# Patient Record
Sex: MALE | Race: WHITE | NOT HISPANIC OR LATINO
[De-identification: names, ages, dates, MRNs, and addresses within clinical notes are randomized per-mention and may not be internally consistent; named-entity substitution may affect disease eponyms.]

---

## 2017-01-25 ENCOUNTER — APPOINTMENT (OUTPATIENT)
Dept: CARDIOLOGY | Facility: CLINIC | Age: 77
End: 2017-01-25

## 2017-01-25 ENCOUNTER — NON-APPOINTMENT (OUTPATIENT)
Age: 77
End: 2017-01-25

## 2017-01-25 VITALS
DIASTOLIC BLOOD PRESSURE: 88 MMHG | HEIGHT: 67 IN | WEIGHT: 166 LBS | BODY MASS INDEX: 26.06 KG/M2 | RESPIRATION RATE: 15 BRPM | SYSTOLIC BLOOD PRESSURE: 155 MMHG | OXYGEN SATURATION: 98 % | HEART RATE: 70 BPM

## 2017-02-23 ENCOUNTER — RX RENEWAL (OUTPATIENT)
Age: 77
End: 2017-02-23

## 2017-04-17 ENCOUNTER — RX RENEWAL (OUTPATIENT)
Age: 77
End: 2017-04-17

## 2017-05-08 ENCOUNTER — EMERGENCY (EMERGENCY)
Facility: HOSPITAL | Age: 77
LOS: 1 days | Discharge: HOME | End: 2017-05-08
Admitting: EMERGENCY MEDICINE
Payer: MEDICARE

## 2017-05-08 DIAGNOSIS — Z98.89 OTHER SPECIFIED POSTPROCEDURAL STATES: Chronic | ICD-10-CM

## 2017-05-08 DIAGNOSIS — K62.5 HEMORRHAGE OF ANUS AND RECTUM: ICD-10-CM

## 2017-05-08 DIAGNOSIS — Z88.0 ALLERGY STATUS TO PENICILLIN: ICD-10-CM

## 2017-05-08 DIAGNOSIS — I10 ESSENTIAL (PRIMARY) HYPERTENSION: ICD-10-CM

## 2017-05-08 DIAGNOSIS — Z79.899 OTHER LONG TERM (CURRENT) DRUG THERAPY: ICD-10-CM

## 2017-05-08 DIAGNOSIS — I25.10 ATHEROSCLEROTIC HEART DISEASE OF NATIVE CORONARY ARTERY WITHOUT ANGINA PECTORIS: ICD-10-CM

## 2017-05-08 DIAGNOSIS — K64.9 UNSPECIFIED HEMORRHOIDS: ICD-10-CM

## 2017-05-08 DIAGNOSIS — Z79.82 LONG TERM (CURRENT) USE OF ASPIRIN: ICD-10-CM

## 2017-05-08 DIAGNOSIS — F17.200 NICOTINE DEPENDENCE, UNSPECIFIED, UNCOMPLICATED: ICD-10-CM

## 2017-05-08 DIAGNOSIS — Z98.890 OTHER SPECIFIED POSTPROCEDURAL STATES: ICD-10-CM

## 2017-05-08 PROCEDURE — 85610 PROTHROMBIN TIME: CPT

## 2017-05-08 PROCEDURE — 85027 COMPLETE CBC AUTOMATED: CPT

## 2017-05-08 PROCEDURE — 99284 EMERGENCY DEPT VISIT MOD MDM: CPT

## 2017-05-08 PROCEDURE — 99283 EMERGENCY DEPT VISIT LOW MDM: CPT | Mod: 25

## 2017-05-08 PROCEDURE — 36415 COLL VENOUS BLD VENIPUNCTURE: CPT

## 2017-05-08 PROCEDURE — 85730 THROMBOPLASTIN TIME PARTIAL: CPT

## 2017-05-21 LAB
ALBUMIN SERPL ELPH-MCNC: 3.8 G/DL
ALP BLD-CCNC: 89 U/L
ALT SERPL-CCNC: 33 U/L
ANION GAP SERPL CALC-SCNC: 16 MMOL/L
AST SERPL-CCNC: 63 U/L
BILIRUB SERPL-MCNC: 0.7 MG/DL
BUN SERPL-MCNC: 23 MG/DL
CALCIUM SERPL-MCNC: 9.5 MG/DL
CHLORIDE SERPL-SCNC: 101 MMOL/L
CHOLEST SERPL-MCNC: 139 MG/DL
CHOLEST/HDLC SERPL: 2.8 RATIO
CO2 SERPL-SCNC: 22 MMOL/L
CREAT SERPL-MCNC: 1.28 MG/DL
GLUCOSE SERPL-MCNC: 107 MG/DL
HDLC SERPL-MCNC: 49 MG/DL
LDLC SERPL CALC-MCNC: 72 MG/DL
POTASSIUM SERPL-SCNC: 4 MMOL/L
PROT SERPL-MCNC: 6.6 G/DL
SODIUM SERPL-SCNC: 139 MMOL/L
TRIGL SERPL-MCNC: 91 MG/DL

## 2017-05-24 ENCOUNTER — APPOINTMENT (OUTPATIENT)
Dept: CARDIOLOGY | Facility: CLINIC | Age: 77
End: 2017-05-24

## 2017-06-16 ENCOUNTER — APPOINTMENT (OUTPATIENT)
Dept: FAMILY MEDICINE | Facility: CLINIC | Age: 77
End: 2017-06-16

## 2017-06-16 VITALS
HEIGHT: 67 IN | BODY MASS INDEX: 25.58 KG/M2 | DIASTOLIC BLOOD PRESSURE: 60 MMHG | OXYGEN SATURATION: 98 % | RESPIRATION RATE: 14 BRPM | TEMPERATURE: 96.8 F | SYSTOLIC BLOOD PRESSURE: 134 MMHG | HEART RATE: 91 BPM | WEIGHT: 163 LBS

## 2017-08-23 LAB
CREAT SPEC-SCNC: 127 MG/DL
FOLATE SERPL-MCNC: >20 NG/ML
HBA1C MFR BLD HPLC: 6.7 %
MICROALBUMIN 24H UR DL<=1MG/L-MCNC: <0.3 MG/DL
MICROALBUMIN/CREAT 24H UR-RTO: NORMAL
VIT B12 SERPL-MCNC: 613 PG/ML

## 2017-08-30 ENCOUNTER — APPOINTMENT (OUTPATIENT)
Dept: CARDIOLOGY | Facility: CLINIC | Age: 77
End: 2017-08-30
Payer: MEDICARE

## 2017-08-30 ENCOUNTER — NON-APPOINTMENT (OUTPATIENT)
Age: 77
End: 2017-08-30

## 2017-08-30 VITALS
OXYGEN SATURATION: 97 % | HEART RATE: 74 BPM | RESPIRATION RATE: 16 BRPM | SYSTOLIC BLOOD PRESSURE: 126 MMHG | WEIGHT: 165 LBS | DIASTOLIC BLOOD PRESSURE: 81 MMHG | BODY MASS INDEX: 25.9 KG/M2 | HEIGHT: 67 IN

## 2017-08-30 PROCEDURE — 99214 OFFICE O/P EST MOD 30 MIN: CPT

## 2017-08-30 PROCEDURE — 93000 ELECTROCARDIOGRAM COMPLETE: CPT

## 2017-10-24 ENCOUNTER — APPOINTMENT (OUTPATIENT)
Dept: FAMILY MEDICINE | Facility: CLINIC | Age: 77
End: 2017-10-24
Payer: MEDICARE

## 2017-10-24 ENCOUNTER — MED ADMIN CHARGE (OUTPATIENT)
Age: 77
End: 2017-10-24

## 2017-10-24 VITALS
HEART RATE: 76 BPM | WEIGHT: 163 LBS | HEIGHT: 67 IN | TEMPERATURE: 97.6 F | SYSTOLIC BLOOD PRESSURE: 120 MMHG | DIASTOLIC BLOOD PRESSURE: 60 MMHG | BODY MASS INDEX: 25.58 KG/M2 | OXYGEN SATURATION: 96 % | RESPIRATION RATE: 16 BRPM

## 2017-10-24 PROCEDURE — G0008: CPT

## 2017-10-24 PROCEDURE — G0439: CPT

## 2017-10-24 PROCEDURE — 90686 IIV4 VACC NO PRSV 0.5 ML IM: CPT

## 2017-12-13 ENCOUNTER — NON-APPOINTMENT (OUTPATIENT)
Age: 77
End: 2017-12-13

## 2017-12-13 ENCOUNTER — APPOINTMENT (OUTPATIENT)
Dept: CARDIOLOGY | Facility: CLINIC | Age: 77
End: 2017-12-13
Payer: MEDICARE

## 2017-12-13 VITALS
HEIGHT: 67 IN | HEART RATE: 82 BPM | RESPIRATION RATE: 18 BRPM | DIASTOLIC BLOOD PRESSURE: 81 MMHG | OXYGEN SATURATION: 96 % | SYSTOLIC BLOOD PRESSURE: 132 MMHG | BODY MASS INDEX: 25.9 KG/M2 | WEIGHT: 165 LBS

## 2017-12-13 PROCEDURE — 99214 OFFICE O/P EST MOD 30 MIN: CPT

## 2017-12-13 PROCEDURE — 93000 ELECTROCARDIOGRAM COMPLETE: CPT

## 2018-03-01 ENCOUNTER — APPOINTMENT (OUTPATIENT)
Dept: FAMILY MEDICINE | Facility: CLINIC | Age: 78
End: 2018-03-01
Payer: MEDICARE

## 2018-03-01 VITALS
RESPIRATION RATE: 16 BRPM | OXYGEN SATURATION: 98 % | HEART RATE: 71 BPM | WEIGHT: 162 LBS | BODY MASS INDEX: 25.43 KG/M2 | DIASTOLIC BLOOD PRESSURE: 70 MMHG | HEIGHT: 67 IN | SYSTOLIC BLOOD PRESSURE: 139 MMHG | TEMPERATURE: 97.2 F

## 2018-03-01 DIAGNOSIS — Z01.818 ENCOUNTER FOR OTHER PREPROCEDURAL EXAMINATION: ICD-10-CM

## 2018-03-01 DIAGNOSIS — Z92.29 PERSONAL HISTORY OF OTHER DRUG THERAPY: ICD-10-CM

## 2018-03-01 DIAGNOSIS — Z87.09 PERSONAL HISTORY OF OTHER DISEASES OF THE RESPIRATORY SYSTEM: ICD-10-CM

## 2018-03-01 DIAGNOSIS — Z23 ENCOUNTER FOR IMMUNIZATION: ICD-10-CM

## 2018-03-01 DIAGNOSIS — Z86.718 PERSONAL HISTORY OF OTHER VENOUS THROMBOSIS AND EMBOLISM: ICD-10-CM

## 2018-03-01 PROCEDURE — 99214 OFFICE O/P EST MOD 30 MIN: CPT

## 2018-03-05 ENCOUNTER — FORM ENCOUNTER (OUTPATIENT)
Age: 78
End: 2018-03-05

## 2018-03-06 ENCOUNTER — APPOINTMENT (OUTPATIENT)
Dept: RADIOLOGY | Facility: HOSPITAL | Age: 78
End: 2018-03-06
Payer: MEDICARE

## 2018-03-06 ENCOUNTER — OUTPATIENT (OUTPATIENT)
Dept: OUTPATIENT SERVICES | Facility: HOSPITAL | Age: 78
LOS: 1 days | End: 2018-03-06
Payer: MEDICARE

## 2018-03-06 DIAGNOSIS — Z98.89 OTHER SPECIFIED POSTPROCEDURAL STATES: Chronic | ICD-10-CM

## 2018-03-06 DIAGNOSIS — Z00.8 ENCOUNTER FOR OTHER GENERAL EXAMINATION: ICD-10-CM

## 2018-03-06 DIAGNOSIS — Z13.820 ENCOUNTER FOR SCREENING FOR OSTEOPOROSIS: ICD-10-CM

## 2018-03-06 PROCEDURE — 77080 DXA BONE DENSITY AXIAL: CPT | Mod: 26

## 2018-03-06 PROCEDURE — 77080 DXA BONE DENSITY AXIAL: CPT

## 2018-03-12 ENCOUNTER — RX RENEWAL (OUTPATIENT)
Age: 78
End: 2018-03-12

## 2018-03-12 LAB
ALBUMIN SERPL ELPH-MCNC: 4 G/DL
ALP BLD-CCNC: 83 U/L
ALT SERPL-CCNC: 16 U/L
ANION GAP SERPL CALC-SCNC: 15 MMOL/L
AST SERPL-CCNC: 25 U/L
BASOPHILS # BLD AUTO: 0.01 K/UL
BASOPHILS NFR BLD AUTO: 0.1 %
BILIRUB SERPL-MCNC: 0.6 MG/DL
BUN SERPL-MCNC: 30 MG/DL
CALCIUM SERPL-MCNC: 9.8 MG/DL
CHLORIDE SERPL-SCNC: 103 MMOL/L
CHOLEST SERPL-MCNC: 130 MG/DL
CHOLEST/HDLC SERPL: 2.7 RATIO
CO2 SERPL-SCNC: 22 MMOL/L
CREAT SERPL-MCNC: 1.27 MG/DL
EOSINOPHIL # BLD AUTO: 0.19 K/UL
EOSINOPHIL NFR BLD AUTO: 2.7 %
GLUCOSE SERPL-MCNC: 136 MG/DL
HBA1C MFR BLD HPLC: 6.8 %
HCT VFR BLD CALC: 42.7 %
HDLC SERPL-MCNC: 48 MG/DL
HGB BLD-MCNC: 14.7 G/DL
IMM GRANULOCYTES NFR BLD AUTO: 0.3 %
LDLC SERPL CALC-MCNC: 63 MG/DL
LYMPHOCYTES # BLD AUTO: 1.59 K/UL
LYMPHOCYTES NFR BLD AUTO: 22.8 %
MAN DIFF?: NORMAL
MCHC RBC-ENTMCNC: 34.4 GM/DL
MCHC RBC-ENTMCNC: 34.8 PG
MCV RBC AUTO: 101.2 FL
MONOCYTES # BLD AUTO: 0.73 K/UL
MONOCYTES NFR BLD AUTO: 10.5 %
NEUTROPHILS # BLD AUTO: 4.43 K/UL
NEUTROPHILS NFR BLD AUTO: 63.6 %
PLATELET # BLD AUTO: 185 K/UL
POTASSIUM SERPL-SCNC: 4.3 MMOL/L
PROT SERPL-MCNC: 7 G/DL
RBC # BLD: 4.22 M/UL
RBC # FLD: 14.5 %
SODIUM SERPL-SCNC: 140 MMOL/L
TRIGL SERPL-MCNC: 96 MG/DL
TSH SERPL-ACNC: 1.18 UIU/ML
WBC # FLD AUTO: 6.97 K/UL

## 2018-03-14 ENCOUNTER — APPOINTMENT (OUTPATIENT)
Dept: CARDIOLOGY | Facility: CLINIC | Age: 78
End: 2018-03-14
Payer: MEDICARE

## 2018-03-14 ENCOUNTER — NON-APPOINTMENT (OUTPATIENT)
Age: 78
End: 2018-03-14

## 2018-03-14 VITALS
RESPIRATION RATE: 15 BRPM | BODY MASS INDEX: 25.43 KG/M2 | OXYGEN SATURATION: 98 % | HEART RATE: 84 BPM | SYSTOLIC BLOOD PRESSURE: 110 MMHG | WEIGHT: 162 LBS | DIASTOLIC BLOOD PRESSURE: 74 MMHG | HEIGHT: 67 IN

## 2018-03-14 PROCEDURE — 93000 ELECTROCARDIOGRAM COMPLETE: CPT

## 2018-03-14 PROCEDURE — 99214 OFFICE O/P EST MOD 30 MIN: CPT

## 2018-04-07 ENCOUNTER — APPOINTMENT (OUTPATIENT)
Dept: ORTHOPEDIC SURGERY | Facility: CLINIC | Age: 78
End: 2018-04-07
Payer: MEDICARE

## 2018-04-07 VITALS
WEIGHT: 162 LBS | HEIGHT: 60 IN | SYSTOLIC BLOOD PRESSURE: 138 MMHG | BODY MASS INDEX: 31.8 KG/M2 | DIASTOLIC BLOOD PRESSURE: 78 MMHG | HEART RATE: 69 BPM

## 2018-04-07 PROCEDURE — 99204 OFFICE O/P NEW MOD 45 MIN: CPT

## 2018-04-07 PROCEDURE — 72110 X-RAY EXAM L-2 SPINE 4/>VWS: CPT

## 2018-05-05 LAB
ALBUMIN SERPL ELPH-MCNC: 3.9 G/DL
ALP BLD-CCNC: 84 U/L
ALT SERPL-CCNC: 14 U/L
ANION GAP SERPL CALC-SCNC: 13 MMOL/L
AST SERPL-CCNC: 22 U/L
BILIRUB SERPL-MCNC: 0.6 MG/DL
BUN SERPL-MCNC: 27 MG/DL
CALCIUM SERPL-MCNC: 9.6 MG/DL
CHLORIDE SERPL-SCNC: 100 MMOL/L
CHOLEST SERPL-MCNC: 130 MG/DL
CHOLEST/HDLC SERPL: 2.6 RATIO
CO2 SERPL-SCNC: 27 MMOL/L
CREAT SERPL-MCNC: 1.2 MG/DL
GLUCOSE SERPL-MCNC: 112 MG/DL
HDLC SERPL-MCNC: 50 MG/DL
LDLC SERPL CALC-MCNC: 64 MG/DL
POTASSIUM SERPL-SCNC: 4.3 MMOL/L
PROT SERPL-MCNC: 6.6 G/DL
SODIUM SERPL-SCNC: 140 MMOL/L
TRIGL SERPL-MCNC: 80 MG/DL

## 2018-05-09 ENCOUNTER — NON-APPOINTMENT (OUTPATIENT)
Age: 78
End: 2018-05-09

## 2018-05-09 ENCOUNTER — APPOINTMENT (OUTPATIENT)
Dept: CARDIOLOGY | Facility: CLINIC | Age: 78
End: 2018-05-09
Payer: MEDICARE

## 2018-05-09 VITALS
SYSTOLIC BLOOD PRESSURE: 138 MMHG | RESPIRATION RATE: 15 BRPM | HEIGHT: 60 IN | DIASTOLIC BLOOD PRESSURE: 76 MMHG | BODY MASS INDEX: 32.2 KG/M2 | HEART RATE: 70 BPM | OXYGEN SATURATION: 100 % | WEIGHT: 164 LBS

## 2018-05-09 PROCEDURE — 99214 OFFICE O/P EST MOD 30 MIN: CPT

## 2018-05-09 PROCEDURE — 93000 ELECTROCARDIOGRAM COMPLETE: CPT

## 2018-06-04 ENCOUNTER — APPOINTMENT (OUTPATIENT)
Dept: CARDIOLOGY | Facility: CLINIC | Age: 78
End: 2018-06-04
Payer: MEDICARE

## 2018-06-04 PROCEDURE — 93306 TTE W/DOPPLER COMPLETE: CPT

## 2018-06-13 ENCOUNTER — APPOINTMENT (OUTPATIENT)
Dept: ORTHOPEDIC SURGERY | Facility: CLINIC | Age: 78
End: 2018-06-13
Payer: MEDICARE

## 2018-06-13 VITALS
HEART RATE: 71 BPM | HEIGHT: 60 IN | BODY MASS INDEX: 32.2 KG/M2 | WEIGHT: 164 LBS | SYSTOLIC BLOOD PRESSURE: 115 MMHG | DIASTOLIC BLOOD PRESSURE: 67 MMHG

## 2018-06-13 DIAGNOSIS — Z87.39 PERSONAL HISTORY OF OTHER DISEASES OF THE MUSCULOSKELETAL SYSTEM AND CONNECTIVE TISSUE: ICD-10-CM

## 2018-06-13 PROCEDURE — 99214 OFFICE O/P EST MOD 30 MIN: CPT

## 2018-06-28 ENCOUNTER — APPOINTMENT (OUTPATIENT)
Dept: FAMILY MEDICINE | Facility: CLINIC | Age: 78
End: 2018-06-28
Payer: MEDICARE

## 2018-06-28 VITALS
BODY MASS INDEX: 31.83 KG/M2 | HEART RATE: 77 BPM | RESPIRATION RATE: 14 BRPM | OXYGEN SATURATION: 99 % | DIASTOLIC BLOOD PRESSURE: 60 MMHG | TEMPERATURE: 97 F | WEIGHT: 163 LBS | SYSTOLIC BLOOD PRESSURE: 144 MMHG

## 2018-06-28 PROCEDURE — 99214 OFFICE O/P EST MOD 30 MIN: CPT

## 2018-06-28 NOTE — HISTORY OF PRESENT ILLNESS
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 Pain 2-3/10  Pt denies CP SOB palpitations or dizziness his Lipitor per pt was cut down by cardiology DR Fabian

## 2018-06-28 NOTE — HEALTH RISK ASSESSMENT
[Intercurrent ED visits] : went to ED [One fall no injury in past year] : Patient reported one fall in the past year without injury [0] : 2) Feeling down, depressed, or hopeless: Not at all (0) [] : No [de-identified] : rectal bleeding  [[id number]] : 0

## 2018-06-28 NOTE — REVIEW OF SYSTEMS
[Joint Stiffness] : joint stiffness [Back Pain] : back pain [Negative] : Heme/Lymph [FreeTextEntry9] : dropped foot bilaterally

## 2018-06-28 NOTE — COUNSELING
[Healthy eating counseling provided] : healthy eating [Activity counseling provided] : activity [___ min/wk activity recommended] : [unfilled] min/wk activity recommended [None] : None [Good understanding] : Patient has a good understanding of lifestyle changes and the steps needed to achieve self management goals

## 2018-06-28 NOTE — PHYSICAL EXAM
[No Acute Distress] : no acute distress [Clear to Auscultation] : lungs were clear to auscultation bilaterally [No Accessory Muscle Use] : no accessory muscle use [Normal S1, S2] : normal S1 and S2 [Soft] : abdomen soft [Non Tender] : non-tender [Non-distended] : non-distended [No Masses] : no abdominal mass palpated [No CVA Tenderness] : no CVA  tenderness [No Joint Swelling] : no joint swelling [No Rash] : no rash [de-identified] : unstable gait secondary bilateral dropped feet

## 2018-07-05 LAB
TSH SERPL-ACNC: 0.99 UIU/ML
VIT B12 SERPL-MCNC: 777 PG/ML

## 2018-08-10 ENCOUNTER — APPOINTMENT (OUTPATIENT)
Dept: CARDIOLOGY | Facility: CLINIC | Age: 78
End: 2018-08-10
Payer: MEDICARE

## 2018-08-10 ENCOUNTER — NON-APPOINTMENT (OUTPATIENT)
Age: 78
End: 2018-08-10

## 2018-08-10 VITALS
SYSTOLIC BLOOD PRESSURE: 125 MMHG | OXYGEN SATURATION: 98 % | HEIGHT: 60 IN | RESPIRATION RATE: 15 BRPM | HEART RATE: 73 BPM | BODY MASS INDEX: 31.8 KG/M2 | WEIGHT: 162 LBS | DIASTOLIC BLOOD PRESSURE: 77 MMHG

## 2018-08-10 PROCEDURE — 99213 OFFICE O/P EST LOW 20 MIN: CPT

## 2018-08-10 PROCEDURE — 93000 ELECTROCARDIOGRAM COMPLETE: CPT

## 2018-08-29 ENCOUNTER — RX RENEWAL (OUTPATIENT)
Age: 78
End: 2018-08-29

## 2018-10-17 ENCOUNTER — FORM ENCOUNTER (OUTPATIENT)
Age: 78
End: 2018-10-17

## 2018-10-18 ENCOUNTER — APPOINTMENT (OUTPATIENT)
Dept: RADIOLOGY | Facility: HOSPITAL | Age: 78
End: 2018-10-18
Payer: MEDICARE

## 2018-10-18 ENCOUNTER — OUTPATIENT (OUTPATIENT)
Dept: OUTPATIENT SERVICES | Facility: HOSPITAL | Age: 78
LOS: 1 days | End: 2018-10-18
Payer: MEDICARE

## 2018-10-18 ENCOUNTER — APPOINTMENT (OUTPATIENT)
Dept: FAMILY MEDICINE | Facility: CLINIC | Age: 78
End: 2018-10-18
Payer: MEDICARE

## 2018-10-18 VITALS
RESPIRATION RATE: 16 BRPM | DIASTOLIC BLOOD PRESSURE: 80 MMHG | BODY MASS INDEX: 31.25 KG/M2 | OXYGEN SATURATION: 95 % | WEIGHT: 160 LBS | SYSTOLIC BLOOD PRESSURE: 130 MMHG | TEMPERATURE: 98.2 F | HEART RATE: 84 BPM

## 2018-10-18 DIAGNOSIS — Z98.89 OTHER SPECIFIED POSTPROCEDURAL STATES: Chronic | ICD-10-CM

## 2018-10-18 DIAGNOSIS — Z00.8 ENCOUNTER FOR OTHER GENERAL EXAMINATION: ICD-10-CM

## 2018-10-18 PROCEDURE — 99213 OFFICE O/P EST LOW 20 MIN: CPT

## 2018-10-18 PROCEDURE — 71046 X-RAY EXAM CHEST 2 VIEWS: CPT

## 2018-10-18 PROCEDURE — 71046 X-RAY EXAM CHEST 2 VIEWS: CPT | Mod: 26

## 2018-10-19 ENCOUNTER — OUTPATIENT (OUTPATIENT)
Dept: OUTPATIENT SERVICES | Facility: HOSPITAL | Age: 78
LOS: 1 days | End: 2018-10-19

## 2018-10-19 ENCOUNTER — APPOINTMENT (OUTPATIENT)
Dept: CT IMAGING | Facility: HOSPITAL | Age: 78
End: 2018-10-19

## 2018-10-19 DIAGNOSIS — Z98.89 OTHER SPECIFIED POSTPROCEDURAL STATES: Chronic | ICD-10-CM

## 2018-10-19 DIAGNOSIS — Z00.8 ENCOUNTER FOR OTHER GENERAL EXAMINATION: ICD-10-CM

## 2018-10-21 ENCOUNTER — FORM ENCOUNTER (OUTPATIENT)
Age: 78
End: 2018-10-21

## 2018-10-22 ENCOUNTER — APPOINTMENT (OUTPATIENT)
Dept: CT IMAGING | Facility: HOSPITAL | Age: 78
End: 2018-10-22
Payer: MEDICARE

## 2018-10-22 ENCOUNTER — OUTPATIENT (OUTPATIENT)
Dept: OUTPATIENT SERVICES | Facility: HOSPITAL | Age: 78
LOS: 1 days | End: 2018-10-22
Payer: MEDICARE

## 2018-10-22 DIAGNOSIS — Z98.89 OTHER SPECIFIED POSTPROCEDURAL STATES: Chronic | ICD-10-CM

## 2018-10-22 DIAGNOSIS — R93.0 ABNORMAL FINDINGS ON DIAGNOSTIC IMAGING OF SKULL AND HEAD, NOT ELSEWHERE CLASSIFIED: ICD-10-CM

## 2018-10-22 DIAGNOSIS — R05 COUGH: ICD-10-CM

## 2018-10-22 PROCEDURE — 71250 CT THORAX DX C-: CPT

## 2018-10-22 PROCEDURE — 71250 CT THORAX DX C-: CPT | Mod: 26

## 2018-10-22 NOTE — HEALTH RISK ASSESSMENT
[] : No [No falls in past year] : Patient reported no falls in the past year [0] : 2) Feeling down, depressed, or hopeless: Not at all (0) [[id number]] : 0

## 2018-10-22 NOTE — ASSESSMENT
[FreeTextEntry1] : Cough- in light of sx and o2 sat\par consider pneumonia\par check xray\par zithromax as directed\par mucinex \par will call with results

## 2018-10-22 NOTE — PHYSICAL EXAM

## 2018-10-22 NOTE — HISTORY OF PRESENT ILLNESS
[FreeTextEntry1] : Cough [de-identified] : Patient with worsening cough\par no fever, but increased sob\par green sputum. Wife recently sick as well\par no chest pain

## 2018-10-25 ENCOUNTER — APPOINTMENT (OUTPATIENT)
Dept: FAMILY MEDICINE | Facility: CLINIC | Age: 78
End: 2018-10-25
Payer: MEDICARE

## 2018-10-25 VITALS
OXYGEN SATURATION: 100 % | TEMPERATURE: 96.8 F | RESPIRATION RATE: 16 BRPM | BODY MASS INDEX: 32 KG/M2 | SYSTOLIC BLOOD PRESSURE: 130 MMHG | DIASTOLIC BLOOD PRESSURE: 72 MMHG | HEIGHT: 60 IN | HEART RATE: 92 BPM | WEIGHT: 163 LBS

## 2018-10-25 PROCEDURE — G0439: CPT

## 2018-10-25 PROCEDURE — G0008: CPT

## 2018-10-25 PROCEDURE — 90686 IIV4 VACC NO PRSV 0.5 ML IM: CPT

## 2018-10-25 NOTE — HISTORY OF PRESENT ILLNESS
[FreeTextEntry1] : ANNUAL CPE [de-identified] : 77 y/o PMHX DM2 HTN HLD A Fib CAD CKD  s/o treatment left upper lobe pneumonia found to have nodules needs f/u CT 3 months here for annual exam and f/u denies CP SOB dizziness or palpitations,. Pt still persistent cough but states is getting better. Pt denies fevers or chills feels better. Pt  denies blood in stool or urine. Daughter and wife worried since pt is becoming more forgetful at times gets 2 cups of coffee has his own routine and f/u but very forgetful. Pt no worsening gait no NA no N&V no vision changes

## 2018-10-25 NOTE — REVIEW OF SYSTEMS
[Earache] : no earache [Hearing Loss] : hearing loss [Nosebleeds] : no nosebleeds [Postnasal Drip] : no postnasal drip [Nasal Discharge] : nasal discharge [Hoarseness] : no hoarseness [Chest Pain] : no chest pain [Claudication] : no  leg claudication [Lower Ext Edema] : no lower extremity edema [Orthopena] : no orthopnea [Paroysmal Nocturnal Dyspnea] : no paroysmal nocturnal dyspnea [Shortness Of Breath] : no shortness of breath [Cough] : cough [Dyspnea on Exertion] : dyspnea on exertion [Negative] : Heme/Lymph [FreeTextEntry6] : states [place] and [place]

## 2018-10-25 NOTE — PHYSICAL EXAM
[No Acute Distress] : no acute distress [Well Nourished] : well nourished [Normal Sclera/Conjunctiva] : normal sclera/conjunctiva [PERRL] : pupils equal round and reactive to light [EOMI] : extraocular movements intact [Normal Outer Ear/Nose] : the outer ears and nose were normal in appearance [Normal Oropharynx] : the oropharynx was normal [Normal TMs] : both tympanic membranes were normal [Supple] : supple [No Lymphadenopathy] : no lymphadenopathy [Thyroid Normal, No Nodules] : the thyroid was normal and there were no nodules present [No Respiratory Distress] : no respiratory distress  [No Accessory Muscle Use] : no accessory muscle use [Normal S1, S2] : normal S1 and S2 [Soft] : abdomen soft [Non Tender] : non-tender [Non-distended] : non-distended [No Masses] : no abdominal mass palpated [No HSM] : no HSM [Normal Bowel Sounds] : normal bowel sounds [Normal Posterior Cervical Nodes] : no posterior cervical lymphadenopathy [Normal Anterior Cervical Nodes] : no anterior cervical lymphadenopathy [No CVA Tenderness] : no CVA  tenderness [No Spinal Tenderness] : no spinal tenderness [Grossly Normal Strength/Tone] : grossly normal strength/tone [No Rash] : no rash [No Focal Deficits] : no focal deficits [Normal Affect] : the affect was normal [Normal Insight/Judgement] : insight and judgment were intact [None] : no ulcers in either foot were found [] : with full ROM [de-identified] : AA In NAD [de-identified] : upper dentures [de-identified] : occasional crackles RML [de-identified] : murmur present [de-identified] : uses breaces both feet foot drop [de-identified] : unstable gait due bilateral dropped foot uses braces ankle and feet [de-identified] : bilkateral dropped foot

## 2018-10-25 NOTE — HEALTH RISK ASSESSMENT
[Good] : ~his/her~  mood as  good [No falls in past year] : Patient reported no falls in the past year [0] : 2) Feeling down, depressed, or hopeless: Not at all (0) [Change in mental status noted] : Change in mental status noted [] : No [de-identified] : no [[id number]] : 0 [de-identified] : forgetfull as per daughter

## 2018-10-25 NOTE — COUNSELING
[Healthy eating counseling provided] : healthy eating [Activity counseling provided] : activity [Behavioral health counseling provided] : behavioral health  [Keep Food Diary] : Keep food diary [Low Salt Diet] : Low salt diet [None] : None [Good understanding] : Patient has a good understanding of lifestyle changes and the steps needed to achieve self management goals

## 2018-10-29 LAB
CREAT SPEC-SCNC: 108 MG/DL
MICROALBUMIN 24H UR DL<=1MG/L-MCNC: <1.2 MG/DL
MICROALBUMIN/CREAT 24H UR-RTO: NORMAL

## 2018-10-31 ENCOUNTER — LABORATORY RESULT (OUTPATIENT)
Age: 78
End: 2018-10-31

## 2018-10-31 LAB — HBA1C MFR BLD HPLC: 6.9 %

## 2018-11-01 LAB
ALBUMIN SERPL ELPH-MCNC: 3.9 G/DL
ALP BLD-CCNC: 82 U/L
ALT SERPL-CCNC: 24 U/L
ANION GAP SERPL CALC-SCNC: 13 MMOL/L
AST SERPL-CCNC: 27 U/L
BILIRUB SERPL-MCNC: 0.6 MG/DL
BUN SERPL-MCNC: 27 MG/DL
CALCIUM SERPL-MCNC: 9.2 MG/DL
CHLORIDE SERPL-SCNC: 99 MMOL/L
CHOLEST SERPL-MCNC: 121 MG/DL
CHOLEST/HDLC SERPL: 3.1 RATIO
CO2 SERPL-SCNC: 24 MMOL/L
CREAT SERPL-MCNC: 1.16 MG/DL
GLUCOSE SERPL-MCNC: 122 MG/DL
HDLC SERPL-MCNC: 39 MG/DL
LDLC SERPL CALC-MCNC: 60 MG/DL
POTASSIUM SERPL-SCNC: 4.4 MMOL/L
PROT SERPL-MCNC: 6.2 G/DL
SODIUM SERPL-SCNC: 136 MMOL/L
TRIGL SERPL-MCNC: 108 MG/DL

## 2018-11-14 ENCOUNTER — APPOINTMENT (OUTPATIENT)
Dept: CARDIOLOGY | Facility: CLINIC | Age: 78
End: 2018-11-14
Payer: MEDICARE

## 2018-11-14 VITALS
WEIGHT: 162 LBS | RESPIRATION RATE: 15 BRPM | SYSTOLIC BLOOD PRESSURE: 145 MMHG | BODY MASS INDEX: 31.8 KG/M2 | HEART RATE: 80 BPM | HEIGHT: 60 IN | DIASTOLIC BLOOD PRESSURE: 69 MMHG | OXYGEN SATURATION: 99 %

## 2018-11-14 PROCEDURE — 93000 ELECTROCARDIOGRAM COMPLETE: CPT

## 2018-11-14 PROCEDURE — 99214 OFFICE O/P EST MOD 30 MIN: CPT

## 2018-11-15 NOTE — REASON FOR VISIT
[Follow-Up - Clinic] : a clinic follow-up of [Coronary Artery Disease] : coronary artery disease [Hyperlipidemia] : hyperlipidemia [FreeTextEntry1] : Michael had a cough for another ct. he has memory loss. he is considering an mri of the brain with dr Esteban.

## 2018-11-15 NOTE — DISCUSSION/SUMMARY
[Coronary Artery Disease] : coronary artery disease [Stable] : stable [None] : none [Peripheral Vascular Disease] : peripheral vascular disease [de-identified] : carotid dopplers

## 2018-11-15 NOTE — PHYSICAL EXAM
[General Appearance - Well Developed] : well developed [Normal Appearance] : normal appearance [Well Groomed] : well groomed [General Appearance - Well Nourished] : well nourished [No Deformities] : no deformities [General Appearance - In No Acute Distress] : no acute distress [Normal Conjunctiva] : the conjunctiva exhibited no abnormalities [Eyelids - No Xanthelasma] : the eyelids demonstrated no xanthelasmas [Normal Oral Mucosa] : normal oral mucosa [No Oral Pallor] : no oral pallor [No Oral Cyanosis] : no oral cyanosis [Normal Jugular Venous A Waves Present] : normal jugular venous A waves present [Normal Jugular Venous V Waves Present] : normal jugular venous V waves present [No Jugular Venous Telles A Waves] : no jugular venous telles A waves [Respiration, Rhythm And Depth] : normal respiratory rhythm and effort [Exaggerated Use Of Accessory Muscles For Inspiration] : no accessory muscle use [Auscultation Breath Sounds / Voice Sounds] : lungs were clear to auscultation bilaterally [Heart Rate And Rhythm] : heart rate and rhythm were normal [Heart Sounds] : normal S1 and S2 [Murmurs] : no murmurs present [Abdomen Soft] : soft [Abdomen Tenderness] : non-tender [Abdomen Mass (___ Cm)] : no abdominal mass palpated [Abnormal Walk] : normal gait [Nail Clubbing] : no clubbing of the fingernails [Cyanosis, Localized] : no localized cyanosis [Petechial Hemorrhages (___cm)] : no petechial hemorrhages [Skin Color & Pigmentation] : normal skin color and pigmentation [] : no rash [No Venous Stasis] : no venous stasis [Skin Lesions] : no skin lesions [No Skin Ulcers] : no skin ulcer [No Xanthoma] : no  xanthoma was observed [Oriented To Time, Place, And Person] : oriented to person, place, and time [Affect] : the affect was normal [Mood] : the mood was normal [No Anxiety] : not feeling anxious [FreeTextEntry1] : uses  a cane

## 2018-12-04 ENCOUNTER — FORM ENCOUNTER (OUTPATIENT)
Age: 78
End: 2018-12-04

## 2018-12-04 ENCOUNTER — APPOINTMENT (OUTPATIENT)
Dept: CARDIOLOGY | Facility: CLINIC | Age: 78
End: 2018-12-04
Payer: MEDICARE

## 2018-12-04 PROCEDURE — 93880 EXTRACRANIAL BILAT STUDY: CPT

## 2018-12-05 ENCOUNTER — APPOINTMENT (OUTPATIENT)
Dept: MRI IMAGING | Facility: HOSPITAL | Age: 78
End: 2018-12-05
Payer: MEDICARE

## 2018-12-05 ENCOUNTER — OUTPATIENT (OUTPATIENT)
Dept: OUTPATIENT SERVICES | Facility: HOSPITAL | Age: 78
LOS: 1 days | End: 2018-12-05
Payer: MEDICARE

## 2018-12-05 DIAGNOSIS — Z98.89 OTHER SPECIFIED POSTPROCEDURAL STATES: Chronic | ICD-10-CM

## 2018-12-05 DIAGNOSIS — R41.3 OTHER AMNESIA: ICD-10-CM

## 2018-12-05 PROCEDURE — 70551 MRI BRAIN STEM W/O DYE: CPT

## 2018-12-05 PROCEDURE — 70551 MRI BRAIN STEM W/O DYE: CPT | Mod: 26

## 2018-12-15 ENCOUNTER — EMERGENCY (EMERGENCY)
Facility: HOSPITAL | Age: 78
LOS: 1 days | Discharge: HOME | End: 2018-12-15
Attending: EMERGENCY MEDICINE
Payer: MEDICARE

## 2018-12-15 VITALS
HEIGHT: 66 IN | WEIGHT: 160.06 LBS | OXYGEN SATURATION: 98 % | TEMPERATURE: 98 F | SYSTOLIC BLOOD PRESSURE: 140 MMHG | DIASTOLIC BLOOD PRESSURE: 82 MMHG | RESPIRATION RATE: 18 BRPM | HEART RATE: 99 BPM

## 2018-12-15 DIAGNOSIS — Z98.89 OTHER SPECIFIED POSTPROCEDURAL STATES: Chronic | ICD-10-CM

## 2018-12-15 PROCEDURE — 99284 EMERGENCY DEPT VISIT MOD MDM: CPT

## 2018-12-15 PROCEDURE — 99282 EMERGENCY DEPT VISIT SF MDM: CPT

## 2018-12-15 NOTE — ED PROVIDER NOTE - OBJECTIVE STATEMENT
79yo male pt with PMHx of HTN, HLD, ambulatory c/o left eye floaters since 3days ago. Denies headache or eye pain. Denies flashes or visual changes. Denies sensory changes or weakness to extremities. Denies CP/SOB/ABD pain or N/V. Denies fever, chills or recent sickness.

## 2018-12-15 NOTE — ED PROVIDER NOTE - NSFOLLOWUPCLINICS_GEN_ALL_ED_FT
[facility]  Ophthalmology  [address] [place] [address] [address]  [place] [address]  Phone: [phone]  Fax:   Follow Up Time:

## 2018-12-15 NOTE — ED PROVIDER NOTE - ATTENDING CONTRIBUTION TO CARE
Attending MD Messina: I personally have seen and examined this patient.  NP note reviewed and agree on plan of care and except where noted.  See below for details.     Seen in [facility], accompanied by wife    78M with PMH/PSH including HTN, back surgery presents to the ED with 2-3 days of a floater in the left eye.  Reports that 3 days ago noted a small hair like thing (sometimes looks like a hairball).  Denies flashes. Denies change in vision, double vision, sudden loss of vision. Denies headache, neck pain.  Denies fevers, chills.  Denies chest pain, shortness of breath, palpitations. Denies abdominal pain, nausea, vomiting, diarrhea, blood in stools. Denies recent illness, previous episode.  Reports on Lisinopril, HCTZ, Atorvastatin, ASA 81mg.  Reports follows with Dr. Fleming of ophthalmology.  On exam, NAD, head NCAT, PERRL, EOMI, confrontational VF full, Va sc OD 20/40, OS 20/30, no periorbital ecchymosis, no tenderness to palpation of orbital rim, lid margins clear, no retained foreign body on lid eversion, no conjunctival injection, no corneal defect, AC no cells or flare, fundus exam limited, disc margins sharp and flat, C/D 0.3 OU; A/P: 78M with floater, suspect posterior vitreous detachment, doubt retinal pathology, ophthalmology in ED, will consult

## 2018-12-15 NOTE — ED PROVIDER NOTE - CARE PLAN
Principal Discharge DX:	Floaters, left Principal Discharge DX:	Posterior vitreous detachment of left eye

## 2018-12-15 NOTE — ED PROVIDER NOTE - PROGRESS NOTE DETAILS
Attending MD Messina: Patient re-evaluated and no acute issues at  this time.  PVD as per ophtho.  Patient stable for discharge. Follow up instructions given, importance of follow up emphasized, return to ED parameters reviewed and patient verbalized understanding.  All questions answered, all concerns addressed.

## 2018-12-15 NOTE — CONSULT NOTE ADULT - ATTENDING COMMENTS
I have reviewed the resident's note. Patient to follow up with primary ophthalmologist or [facility] immediately if there is development of worsening flashes, floaters, or curtains in vision.

## 2018-12-15 NOTE — ED PROVIDER NOTE - PHYSICAL EXAMINATION
NAD, VSS, Afebrile, + PERRL with full EOMS, NAD, VSS, Afebrile, + PERRL with full EOMS, Visual Acuity- 20/30 (Left), 20/40 (Right), No focal neuro deficit.

## 2018-12-15 NOTE — CONSULT NOTE ADULT - SUBJECTIVE AND OBJECTIVE BOX
[facility] Ophthalmology Consult Note    HPI:      PMH: None  Meds: None  POcHx (including surgeries/lasers/trauma):  None  Drops: None  FamHx: None  Social Hx: None  Allergies: NKDA    ROS:  General (neg), Vision (per HPI), Head and Neck (neg), Pulm (neg), CV (neg), GI (neg),  (neg), Musculoskeletal (neg), Skin/Integ (neg), Neuro (neg), Endocrine (neg), Heme (neg), All/Immuno (neg)    Mood and Affect Appropriate ( x ),  Oriented to Time, Place, and Person x 3 ( x )    Ophthalmology Exam    Visual acuity (sc): 20/20 OU  Pupils: PERRL OU, no APD  Ttono: 16 OU  Extraocular movements (EOMs): Full OU, no pain, no diplopia  Confrontational Visual Field (CVF):  Full OU  Color Plates: 12/12 OU    Slit Lamp Exam (SLE)  External:  Flat OU  Lids/Lashes/Lacrimal Ducts: Flat OU    Sclera/Conjunctiva:  W+Q OU  Cornea: Cl OU  Anterior Chamber: D+Q OU   Iris:  Flat OU  Lens:  Cl OU    Fundus Exam: dilated with 1% tropicamide and 2.5% phenylephrine  Approval obtained from primary team for dilation  Patient aware that pupils can remained dilated for at least 4-6 hours  Exam performed with 20D lens    Vitreous: wnl OU  Disc, cup/disc: sharp and pink, 0.4 OU  Macula:  wnl OU  Vessels:  wnl OU  Periphery: wnl OU    Diagnostic Testing:    Assessment:      Plan:      Follow-Up:  Patient should follow up with his ophthalmologist or in the [facility] Practice within 1 week of discharge  [address] [place] [address] [phone] [facility] Ophthalmology Consult Note    HPI: 78-year-old male with PMHx HTN, HLD and no POcHx presents with 3-day history of left eye floaters. Patient was in usual state of health at home when he noticed left eye floaters with 2-3 episodes of flashes as well yesterday night and earlier today. No curtain of black across vision. States vision is at baseline. Denies trauma, double vision, headache, jaw claudication, scalp tenderness, numbness/tingling.    PMH: HTN, HLD  Meds: lisinopril, statin  POcHx (including surgeries/lasers/trauma): Denies. Wears glasses for distance and near.  Drops: None  FamHx: None  Social Hx: None  Allergies: PCN - rash    ROS:  General (neg), Vision (per HPI), Head and Neck (neg), Pulm (neg), CV (neg), GI (neg),  (neg), Musculoskeletal (neg), Skin/Integ (neg), Neuro (neg), Endocrine (neg), Heme (neg), All/Immuno (neg)    Mood and Affect Appropriate ( x ),  Oriented to Time, Place, and Person x 3 ( x )    Ophthalmology Exam    Visual acuity (sc): 20/25-2 OU  Pupils: PERRL OU, no APD  Ttono: 15 OD, 18 OS  Extraocular movements (EOMs): Full OU, no pain, no diplopia  Confrontational Visual Field (CVF):  Full OU, states sees floater IT OS  Color Plates: 11/12 OU    Slit Lamp Exam (SLE)  External:  Flat OU  Lids/Lashes/Lacrimal Ducts: Flat OU    Sclera/Conjunctiva:  W+Q OU  Cornea: Clear OU  Anterior Chamber: D+Q OU   Iris:  Flat OU  Lens:  1+ NS OU    Fundus Exam: dilated with 1% tropicamide and 2.5% phenylephrine  Approval obtained from primary team for dilation  Patient aware that pupils can remained dilated for at least 4-6 hours  Exam performed with 90D and 20D lens    Vitreous: wnl OU  Disc, cup/disc: sharp and pink, 0.4 OU  Macula:  wnl OU  Vessels:  wnl OU  Periphery: wnl OU    Diagnostic Testing: none    Assessment: 78-year-old male with PMHx HTN, HLD and no POcHx presents with 3-day history of left eye floaters. OS - VA 20/25, IOP WNL, no APD, CVF full. Anterior segment exam WNL. DFE       Plan:      Follow-Up:  Patient should follow up with his ophthalmologist (Dr. Fleming in [place]) or in the [facility] Practice within 1 week of discharge  [address] [place] [address].  [place] [address]  [phone] [facility] Ophthalmology Consult Note    HPI: 78-year-old male with PMHx HTN, HLD and no POcHx presents with 3-day history of left eye floaters. Patient was in usual state of health at home when he noticed left eye floaters with 2-3 episodes of flashes as well yesterday night and earlier today. No curtain of black across vision. States vision is at baseline. Denies trauma, double vision, headache, jaw claudication, scalp tenderness, numbness/tingling.    PMH: HTN, HLD  Meds: HCTZ, atorvastatin, ASA 81mg  POcHx (including surgeries/lasers/trauma): Denies. Wears glasses for distance and near.  Drops: None  FamHx: None  Social Hx: None  Allergies: PCN - rash    ROS:  General (neg), Vision (per HPI), Head and Neck (neg), Pulm (neg), CV (neg), GI (neg),  (neg), Musculoskeletal (neg), Skin/Integ (neg), Neuro (neg), Endocrine (neg), Heme (neg), All/Immuno (neg)    Mood and Affect Appropriate ( x ),  Oriented to Time, Place, and Person x 3 ( x )    Ophthalmology Exam    Visual acuity (sc): 20/25-2 OU  Pupils: PERRL OU, no APD  Ttono: 15 OD, 18 OS  Extraocular movements (EOMs): Full OU, no pain, no diplopia  Confrontational Visual Field (CVF):  Full OU, states sees floater IT OS  Color Plates: 11/12 OU    Slit Lamp Exam (SLE)  External:  Flat OU  Lids/Lashes/Lacrimal Ducts: Flat OU    Sclera/Conjunctiva:  W+Q OU  Cornea: Clear OU  Anterior Chamber: D+Q OU   Iris:  Flat OU  Lens:  1+ NS OU    Fundus Exam: dilated with 1% tropicamide and 2.5% phenylephrine  Approval obtained from primary team for dilation  Patient aware that pupils can remained dilated for at least 4-6 hours  Exam performed with 90D and 20D lens    Vitreous: wnl OU  Disc, cup/disc: sharp and pink, 0.4 OU  Macula:  wnl OU  Vessels:  wnl OU  Periphery: wnl OU    Diagnostic Testing: none    Assessment: 78-year-old male with PMHx HTN, HLD and no POcHx presents with 3-day history of left eye floaters. OS - VA 20/25, IOP WNL, no APD, CVF full. Anterior segment exam WNL. DFE       Plan:      Follow-Up:  Patient should follow up with his ophthalmologist (Dr. Fleming in [place]) or in the [facility]mology Practice within 1 week of discharge  [address] [place] [address].  [place] [address]  [phone] [facility] Ophthalmology Consult Note    HPI: 78-year-old male with PMHx HTN, HLD and no POcHx presents with 3-day history of left eye floaters. Patient was in usual state of health at home when he noticed left eye floaters with 2-3 episodes of flashes as well yesterday night and earlier today. No curtain of black across vision. States vision is at baseline. Denies trauma, double vision, headache, jaw claudication, scalp tenderness, numbness/tingling.    PMH: HTN, HLD  Meds: HCTZ, atorvastatin, ASA 81mg  POcHx (including surgeries/lasers/trauma): Denies. Wears glasses for distance and near.  Drops: None  FamHx: None  Social Hx: None  Allergies: PCN - rash    ROS:  General (neg), Vision (per HPI), Head and Neck (neg), Pulm (neg), CV (neg), GI (neg),  (neg), Musculoskeletal (neg), Skin/Integ (neg), Neuro (neg), Endocrine (neg), Heme (neg), All/Immuno (neg)    Mood and Affect Appropriate ( x ),  Oriented to Time, Place, and Person x 3 ( x )    Ophthalmology Exam    Visual acuity (sc): 20/25-2 OU  Pupils: PERRL OU, no APD  Ttono: 15 OD, 18 OS  Extraocular movements (EOMs): Full OU, no pain, no diplopia  Confrontational Visual Field (CVF):  Full OU, states sees floater IT OS  Color Plates: 11/12 OU    Slit Lamp Exam (SLE)  External:  Flat OU  Lids/Lashes/Lacrimal Ducts: Flat OU    Sclera/Conjunctiva:  W+Q OU  Cornea: Clear OU  Anterior Chamber: D+Q OU   Iris:  Flat OU  Lens:  1+ NS OU, trace CC OU    Fundus Exam: dilated with 1% tropicamide and 2.5% phenylephrine  Approval obtained from primary team for dilation  Patient aware that pupils can remained dilated for at least 4-6 hours  Exam performed with 90D and 20D lens    Vitreous: no pigmented cells OU, no hemorrhage OU, posterior vitreous detachment OS  Disc, cup/disc: sharp and pink, 0.3 OU  Macula:  flat OU  Vessels:  wnl OU  Periphery: 360 degrees flat, no apparent tear/break/detachment; pigmentary changes nasal and inferior OU    Diagnostic Testing: none    Assessment: 78-year-old male with PMHx HTN, HLD and no POcHx presents with 3-day history of left eye floaters. OU - VA 20/25-2, IOP WNL, no APD, CVF full. Anterior segment exam WNL OU. DFE significant for PVD OS, OU no pigment or hemorrhage in vitreous, flat macula, peripheral exam 360 flat with no apparent tears/breaks/detachments, nerves WNL OU. Exam consistent with PVD OS.    Plan:   - RD precautions (return to ED if increase in flashes/floaters, worsening vision, or curtain/shadow across vision)  - patient to follow-up with outpatient ophthalmologist within 1 week    Follow-Up:  Patient should follow up with his ophthalmologist (Dr. Fleming in [place]) or in the [facility]hthalmology Practice within 1 week of discharge  [address] [place] [address].  [place] [address] [phone]    Albino Blum MD  PGY-2 Ophthalmology

## 2018-12-15 NOTE — ED PROVIDER NOTE - NSFOLLOWUPINSTRUCTIONS_ED_ALL_ED_FT
Patient should follow up with your ophthalmologist (Dr. Fleming in [place]) or in the [facility] Practice within 1 week of discharge  [address] [place] [address]  [phone]

## 2019-01-23 RX ORDER — AZITHROMYCIN 250 MG/1
250 TABLET, FILM COATED ORAL
Qty: 6 | Refills: 0 | Status: DISCONTINUED | COMMUNITY
Start: 2018-10-18 | End: 2019-01-23

## 2019-01-27 ENCOUNTER — FORM ENCOUNTER (OUTPATIENT)
Age: 79
End: 2019-01-27

## 2019-01-28 ENCOUNTER — OUTPATIENT (OUTPATIENT)
Dept: OUTPATIENT SERVICES | Facility: HOSPITAL | Age: 79
LOS: 1 days | End: 2019-01-28
Payer: MEDICARE

## 2019-01-28 ENCOUNTER — APPOINTMENT (OUTPATIENT)
Dept: CT IMAGING | Facility: HOSPITAL | Age: 79
End: 2019-01-28
Payer: MEDICARE

## 2019-01-28 DIAGNOSIS — Z98.89 OTHER SPECIFIED POSTPROCEDURAL STATES: Chronic | ICD-10-CM

## 2019-01-28 DIAGNOSIS — R93.89 ABNORMAL FINDINGS ON DIAGNOSTIC IMAGING OF OTHER SPECIFIED BODY STRUCTURES: ICD-10-CM

## 2019-01-28 PROCEDURE — 71250 CT THORAX DX C-: CPT | Mod: 26

## 2019-01-28 PROCEDURE — 71250 CT THORAX DX C-: CPT

## 2019-02-01 ENCOUNTER — APPOINTMENT (OUTPATIENT)
Dept: FAMILY MEDICINE | Facility: CLINIC | Age: 79
End: 2019-02-01
Payer: MEDICARE

## 2019-02-01 VITALS
HEART RATE: 82 BPM | OXYGEN SATURATION: 98 % | TEMPERATURE: 97 F | SYSTOLIC BLOOD PRESSURE: 140 MMHG | RESPIRATION RATE: 16 BRPM | WEIGHT: 165 LBS | DIASTOLIC BLOOD PRESSURE: 60 MMHG | BODY MASS INDEX: 32.22 KG/M2

## 2019-02-01 DIAGNOSIS — R93.89 ABNORMAL FINDINGS ON DIAGNOSTIC IMAGING OF OTHER SPECIFIED BODY STRUCTURES: ICD-10-CM

## 2019-02-01 LAB — HBA1C MFR BLD HPLC: 7.1

## 2019-02-01 PROCEDURE — 99213 OFFICE O/P EST LOW 20 MIN: CPT

## 2019-02-01 PROCEDURE — 83036 HEMOGLOBIN GLYCOSYLATED A1C: CPT | Mod: QW

## 2019-02-01 NOTE — REVIEW OF SYSTEMS
[Headache] : no headache [Memory Loss] : memory loss [Negative] : Heme/Lymph [de-identified] : forgetful no NA vision changes MR loss of normal flow void right vertebral artery

## 2019-02-01 NOTE — PHYSICAL EXAM
[No Respiratory Distress] : no respiratory distress  [Clear to Auscultation] : lungs were clear to auscultation bilaterally [No Accessory Muscle Use] : no accessory muscle use [Regular Rhythm] : with a regular rhythm [Normal S1, S2] : normal S1 and S2 [Soft] : abdomen soft [Non Tender] : non-tender [Non-distended] : non-distended [No CVA Tenderness] : no CVA  tenderness [No Spinal Tenderness] : no spinal tenderness [No Rash] : no rash [de-identified] : uses caneand bilateral ankle braces to ambulate [de-identified] : ataxic gait uses cane and braces on both ankles to ambulate secondary dropped feet

## 2019-02-01 NOTE — HISTORY OF PRESENT ILLNESS
[FreeTextEntry1] : f/u [de-identified] : 79 y/o CAD HLD HTN DM2 forgetfulness here for f/u. Recent CT Chest improved. per wife still forgetful no c/o HA vision changes or other issues  no bowel or bladder issues.  pt denies CP dizziness or SOB  Pt went to eye MD then Dr Mcmahon and then Dr Anne all ok as per pt report

## 2019-03-04 ENCOUNTER — MEDICATION RENEWAL (OUTPATIENT)
Age: 79
End: 2019-03-04

## 2019-03-12 ENCOUNTER — APPOINTMENT (OUTPATIENT)
Dept: NEUROLOGY | Facility: CLINIC | Age: 79
End: 2019-03-12
Payer: MEDICARE

## 2019-03-12 VITALS
WEIGHT: 160 LBS | HEART RATE: 78 BPM | DIASTOLIC BLOOD PRESSURE: 81 MMHG | BODY MASS INDEX: 25.11 KG/M2 | HEIGHT: 67 IN | SYSTOLIC BLOOD PRESSURE: 160 MMHG

## 2019-03-12 VITALS — DIASTOLIC BLOOD PRESSURE: 81 MMHG | SYSTOLIC BLOOD PRESSURE: 155 MMHG | HEART RATE: 67 BPM

## 2019-03-12 DIAGNOSIS — Z78.9 OTHER SPECIFIED HEALTH STATUS: ICD-10-CM

## 2019-03-12 DIAGNOSIS — Z80.0 FAMILY HISTORY OF MALIGNANT NEOPLASM OF DIGESTIVE ORGANS: ICD-10-CM

## 2019-03-12 PROCEDURE — 99205 OFFICE O/P NEW HI 60 MIN: CPT

## 2019-03-12 NOTE — REVIEW OF SYSTEMS
[As Noted in HPI] : as noted in HPI [Loss Of Hearing] : hearing loss [Negative] : Heme/Lymph [FreeTextEntry4] : Bilateral hearing loss - s/p hearing aides

## 2019-03-12 NOTE — PHYSICAL EXAM
[FreeTextEntry1] : General exam: Sitting in the chair and does not appear to be in distress\par Carotid bruits: None\par CVS: S1-S2 present\par RS: CTA B\par Skin: No lesion noted on visible skin \par \par Neuro exam: \par MS: Alert, awake and is partially oriented to time (to the month but not to the year), place (to the building and the state but not to city) and person with mildly impaired attention span, impaired recent and remote memory\par Language: Fluent speech with intact comprehension, with intact naming and repetition, no right-left confusion, no finger agnosia and no apraxia. Normal fund of knowledge. No dysarthria. \par Cr.N.: Pupils bilaterally 3-4 mm in size, equal, round and reactive to light, fundus examination was performed but I was unable to locate the discs due to technical difficulties including poor fixation and small-sized pupils, intact visual fields to confrontation, extraocular movements intact without any diplopia, no ptosis, no nystagmus, face appears to be symmetric with intact facial sensations, no hearing loss to rubbing fingers, tongue is in the midline, uvula elevates in the midline and without any drooping of the soft palate, normal shrugging of the shoulders bilaterally.\par \par Motor: \par Tone - Normal\par Bulk - No atrophy\par Power - No obvious pronator drift \par Bilateral UEs - 5/5\par Bilateral LEs - Proximally 5/5, distally FDF, F Inv, F Mely 0/5, FPF 4/5\par DTRs - +2 all over and bilaterally symmetric\par Plantars: Bilaterally flexor\par Sensory: Intact to light touch and pinprick, no sensory extinction. \par Cerebellar: No ataxia on finger-nose-finger and knee-heel-shin testing, as well as without any dysdiadochokinesia\par Gait: Normal casual gait with normal stride and length with bilateral AFOs \par Romberg's sign: Negative

## 2019-03-12 NOTE — ASSESSMENT
[FreeTextEntry1] : 79 Y/O R handed man with vascular risk factors of HTN, diet controlled DM II, HPLD, ? CAD  and age is seen in the vascular neurology office for the evaluation and management of short term memory loss. He is reported to progressive memory loss - predominantly affecting short term memory not interfering with his abilities to perform ADLs noticed since last few years. MRI brain (12/18) is reported to show "no acute intracranial hemorrhage or infarct, loss of normal flow void in the intradural segment of distal right vertebral artery which may be related to occlusion/high-grade stenosis". Neurological examination today is grossly nonfocal except evidence of mild cognitive dysfunction.  Impression: Abnormal MRI brain. 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 in this patient with left vertebral artery dominant vertebrobasilar system - the exact etiology of which remains unclear but could be related to atherosclerotic severe stenosis leading to sluggish flow versus complete occlusion of intracranial/intradural right vertebral artery - asymptomatic  Plan: - He's currently on aspirin 81 mg once a day from cardiac standpoint. Agree to continue with aspirin - Atorvastatin 20 mg and bedtime as per home medications/dose; further dose titration is deferred to PCP/cardiologist based on medical indications/ASCVD risk profile - He should follow up closely with her primary care physician for optimal vascular risk factors modifications including blood pressure goal less than 130/80 mmHg, HbA1c goal <7 and preferably 6-6.5 and optimal cholesterol management  - He is advised to check blood pressure regularly at home, preferably at different times during the day and multiple days a week and was advised to keep a log of BPs to bring to primary care physician visit for further instructions regarding optimal BP management. Also advised to followup closely with PCP regarding regular blood work including monitoring of HbA1c and cholesterol profile - MRA head and neck with and without contrast; renal functions to be checked before administration of MR contrast - Vitamin B1, vitamin B12, folate, TSH and RPR to evaluate for possible reversible causes of short-term memory loss - Neuropsychological evaluation to determine severity and domains of the cognition affected - Referral to Dr. Richmond/Dr. Pappas for further evaluation and management of short-term memory loss likely secondary to mild cognitive impairment  - Above mentioned plan was discussed with patient and available family members in detail. I have answered all their questions to the best of my abilities. I have reviewed measures for stroke prevention with the patient and available family members, including aggressive vascular risk factors modification, healthy diet, regular exercise and medication compliance. As well as discussed the need for calling 911 immediately in case of any symptoms concerning for stroke in the future.  - I would follow him in the office in about 3-4 months or earlier as needed. Also advised to contact me upon completion of imaging studies and/or laboratory testing/investigations to discuss the results over the phone.

## 2019-03-12 NOTE — HISTORY OF PRESENT ILLNESS
[FreeTextEntry1] : 77 Y/O R handed man with vascular risk factors of HTN, diet controlled DM II, HPLD, ? CAD  and age is seen in the vascular neurology office for the evaluation and management of short term memory loss. \par \par He is reported to progressive memory loss - predominantly affecting short term memory not interfering with his abilities to perform ADLs. He underwent MRI brain for further evaluation. He was referred to vascular neurology office for evaluation of "abnormal MRI brain". He denies any episodes of focal neurological symptoms like focal motor weakness, focal sensory changes like tingling or numbness, vision or language disturbance, dysarthria, dysphagia, headache, nausea, vomiting, imbalance, dizziness or vertiginous sensations in the past. Reports to be taking aspirin and statin from cardiac stand point. Denies any family history of stroke at young age. Reports to have harmeet provoked DVT in 2012, attributed to immobilization after back surgery and he was on therapeutic anticoagulation with warfarin for 6 months. Denies any family history of DVT/PEs. \par \par ROS: All negative except documented in the history of present illness.\par \par Home medications:\par Reviewed with the patient/available family member and updated as appropriate.

## 2019-03-13 ENCOUNTER — APPOINTMENT (OUTPATIENT)
Dept: CARDIOLOGY | Facility: CLINIC | Age: 79
End: 2019-03-13
Payer: MEDICARE

## 2019-03-13 VITALS
HEART RATE: 71 BPM | DIASTOLIC BLOOD PRESSURE: 66 MMHG | HEIGHT: 67 IN | BODY MASS INDEX: 25.11 KG/M2 | WEIGHT: 160 LBS | OXYGEN SATURATION: 97 % | SYSTOLIC BLOOD PRESSURE: 151 MMHG | RESPIRATION RATE: 16 BRPM

## 2019-03-13 DIAGNOSIS — Z79.899 OTHER LONG TERM (CURRENT) DRUG THERAPY: ICD-10-CM

## 2019-03-13 LAB
BUN SERPL-MCNC: 26 MG/DL
CREAT SERPL-MCNC: 1.23 MG/DL
FOLATE SERPL-MCNC: >20 NG/ML
T PALLIDUM AB SER QL IA: NEGATIVE
TSH SERPL-ACNC: 0.93 UIU/ML
VIT B12 SERPL-MCNC: 630 PG/ML

## 2019-03-13 PROCEDURE — 99214 OFFICE O/P EST MOD 30 MIN: CPT

## 2019-03-13 PROCEDURE — 93000 ELECTROCARDIOGRAM COMPLETE: CPT

## 2019-03-14 ENCOUNTER — OTHER (OUTPATIENT)
Age: 79
End: 2019-03-14

## 2019-03-17 ENCOUNTER — FORM ENCOUNTER (OUTPATIENT)
Age: 79
End: 2019-03-17

## 2019-03-18 ENCOUNTER — APPOINTMENT (OUTPATIENT)
Dept: MRI IMAGING | Facility: HOSPITAL | Age: 79
End: 2019-03-18
Payer: MEDICARE

## 2019-03-18 ENCOUNTER — OUTPATIENT (OUTPATIENT)
Dept: OUTPATIENT SERVICES | Facility: HOSPITAL | Age: 79
LOS: 1 days | End: 2019-03-18
Payer: MEDICARE

## 2019-03-18 DIAGNOSIS — Z98.89 OTHER SPECIFIED POSTPROCEDURAL STATES: Chronic | ICD-10-CM

## 2019-03-18 DIAGNOSIS — R93.0 ABNORMAL FINDINGS ON DIAGNOSTIC IMAGING OF SKULL AND HEAD, NOT ELSEWHERE CLASSIFIED: ICD-10-CM

## 2019-03-18 PROCEDURE — 70549 MR ANGIOGRAPH NECK W/O&W/DYE: CPT | Mod: 26

## 2019-03-18 PROCEDURE — 70549 MR ANGIOGRAPH NECK W/O&W/DYE: CPT

## 2019-03-18 PROCEDURE — 70544 MR ANGIOGRAPHY HEAD W/O DYE: CPT | Mod: 26

## 2019-03-18 PROCEDURE — 70544 MR ANGIOGRAPHY HEAD W/O DYE: CPT

## 2019-03-18 PROCEDURE — A9579: CPT

## 2019-03-19 LAB — VIT B1 SERPL-MCNC: 152.6 NMOL/L

## 2019-03-24 PROBLEM — Z79.899 MEDICATION MANAGEMENT: Status: ACTIVE | Noted: 2019-03-24

## 2019-03-24 NOTE — REASON FOR VISIT
[Follow-Up - Clinic] : a clinic follow-up of [Coronary Artery Disease] : coronary artery disease [Hyperlipidemia] : hyperlipidemia [FreeTextEntry1] : Michael had a cough for another ct. he has memory loss. he is considering an mri of the brain with dr Esteban.  we will consider atrial off a statin to see if three is any improvement in memory.

## 2019-03-24 NOTE — DISCUSSION/SUMMARY
[Coronary Artery Disease] : coronary artery disease [Stable] : stable [None] : none [Peripheral Vascular Disease] : peripheral vascular disease [de-identified] : carotid dopplers

## 2019-04-16 ENCOUNTER — EMERGENCY (EMERGENCY)
Facility: HOSPITAL | Age: 79
LOS: 1 days | Discharge: HOME | End: 2019-04-16
Attending: INTERNAL MEDICINE | Admitting: INTERNAL MEDICINE
Payer: MEDICARE

## 2019-04-16 ENCOUNTER — APPOINTMENT (OUTPATIENT)
Dept: NEUROLOGY | Facility: CLINIC | Age: 79
End: 2019-04-16
Payer: MEDICARE

## 2019-04-16 VITALS
RESPIRATION RATE: 19 BRPM | HEART RATE: 79 BPM | OXYGEN SATURATION: 95 % | SYSTOLIC BLOOD PRESSURE: 117 MMHG | TEMPERATURE: 98 F | DIASTOLIC BLOOD PRESSURE: 62 MMHG

## 2019-04-16 VITALS
TEMPERATURE: 98 F | WEIGHT: 164.91 LBS | RESPIRATION RATE: 18 BRPM | DIASTOLIC BLOOD PRESSURE: 61 MMHG | HEIGHT: 66 IN | SYSTOLIC BLOOD PRESSURE: 119 MMHG | HEART RATE: 84 BPM | OXYGEN SATURATION: 98 %

## 2019-04-16 DIAGNOSIS — Z98.89 OTHER SPECIFIED POSTPROCEDURAL STATES: Chronic | ICD-10-CM

## 2019-04-16 LAB
ALBUMIN SERPL ELPH-MCNC: 3.4 G/DL — SIGNIFICANT CHANGE UP (ref 3.3–5)
ALP SERPL-CCNC: 83 U/L — SIGNIFICANT CHANGE UP (ref 40–120)
ALT FLD-CCNC: 22 U/L DA — SIGNIFICANT CHANGE UP (ref 10–45)
ANION GAP SERPL CALC-SCNC: 13 MMOL/L — SIGNIFICANT CHANGE UP (ref 5–17)
AST SERPL-CCNC: 24 U/L — SIGNIFICANT CHANGE UP (ref 10–40)
BASOPHILS # BLD AUTO: 0 K/UL — SIGNIFICANT CHANGE UP (ref 0–0.2)
BASOPHILS NFR BLD AUTO: 0.7 % — SIGNIFICANT CHANGE UP (ref 0–2)
BILIRUB SERPL-MCNC: 0.5 MG/DL — SIGNIFICANT CHANGE UP (ref 0.2–1.2)
BUN SERPL-MCNC: 32 MG/DL — HIGH (ref 7–23)
CALCIUM SERPL-MCNC: 9.4 MG/DL — SIGNIFICANT CHANGE UP (ref 8.4–10.5)
CHLORIDE SERPL-SCNC: 106 MMOL/L — SIGNIFICANT CHANGE UP (ref 96–108)
CO2 SERPL-SCNC: 21 MMOL/L — LOW (ref 22–31)
CREAT SERPL-MCNC: 1.19 MG/DL — SIGNIFICANT CHANGE UP (ref 0.5–1.3)
D DIMER BLD IA.RAPID-MCNC: 302 NG/ML DDU — HIGH
EOSINOPHIL # BLD AUTO: 0.1 K/UL — SIGNIFICANT CHANGE UP (ref 0–0.5)
EOSINOPHIL NFR BLD AUTO: 1.9 % — SIGNIFICANT CHANGE UP (ref 0–6)
GLUCOSE SERPL-MCNC: 110 MG/DL — HIGH (ref 70–99)
HCT VFR BLD CALC: 41.3 % — SIGNIFICANT CHANGE UP (ref 39–50)
HGB BLD-MCNC: 14.2 G/DL — SIGNIFICANT CHANGE UP (ref 13–17)
LYMPHOCYTES # BLD AUTO: 1.7 K/UL — SIGNIFICANT CHANGE UP (ref 1–3.3)
LYMPHOCYTES # BLD AUTO: 29 % — SIGNIFICANT CHANGE UP (ref 13–44)
MCHC RBC-ENTMCNC: 34.5 GM/DL — SIGNIFICANT CHANGE UP (ref 32–36)
MCHC RBC-ENTMCNC: 35.6 PG — HIGH (ref 27–34)
MCV RBC AUTO: 103 FL — HIGH (ref 80–100)
MONOCYTES # BLD AUTO: 0.4 K/UL — SIGNIFICANT CHANGE UP (ref 0–0.9)
MONOCYTES NFR BLD AUTO: 7.4 % — SIGNIFICANT CHANGE UP (ref 2–14)
NEUTROPHILS # BLD AUTO: 3.6 K/UL — SIGNIFICANT CHANGE UP (ref 1.8–7.4)
NEUTROPHILS NFR BLD AUTO: 60.9 % — SIGNIFICANT CHANGE UP (ref 43–77)
NT-PROBNP SERPL-SCNC: 398 PG/ML — HIGH (ref 0–300)
PLATELET # BLD AUTO: 158 K/UL — SIGNIFICANT CHANGE UP (ref 150–400)
POTASSIUM SERPL-MCNC: 4.1 MMOL/L — SIGNIFICANT CHANGE UP (ref 3.5–5.3)
POTASSIUM SERPL-SCNC: 4.1 MMOL/L — SIGNIFICANT CHANGE UP (ref 3.5–5.3)
PROT SERPL-MCNC: 6.6 G/DL — SIGNIFICANT CHANGE UP (ref 6–8.3)
RBC # BLD: 4.01 M/UL — LOW (ref 4.2–5.8)
RBC # FLD: 12.2 % — SIGNIFICANT CHANGE UP (ref 10.3–14.5)
SODIUM SERPL-SCNC: 140 MMOL/L — SIGNIFICANT CHANGE UP (ref 135–145)
TROPONIN I SERPL-MCNC: <.017 NG/ML — LOW (ref 0.02–0.06)
WBC # BLD: 5.9 K/UL — SIGNIFICANT CHANGE UP (ref 3.8–10.5)
WBC # FLD AUTO: 5.9 K/UL — SIGNIFICANT CHANGE UP (ref 3.8–10.5)

## 2019-04-16 PROCEDURE — 71275 CT ANGIOGRAPHY CHEST: CPT | Mod: 26

## 2019-04-16 PROCEDURE — 96138 PSYCL/NRPSYC TECH 1ST: CPT

## 2019-04-16 PROCEDURE — 96139 PSYCL/NRPSYC TST TECH EA: CPT

## 2019-04-16 PROCEDURE — 93005 ELECTROCARDIOGRAM TRACING: CPT

## 2019-04-16 PROCEDURE — 71046 X-RAY EXAM CHEST 2 VIEWS: CPT | Mod: 26

## 2019-04-16 PROCEDURE — 96374 THER/PROPH/DIAG INJ IV PUSH: CPT

## 2019-04-16 PROCEDURE — 99284 EMERGENCY DEPT VISIT MOD MDM: CPT | Mod: 25

## 2019-04-16 PROCEDURE — 85027 COMPLETE CBC AUTOMATED: CPT

## 2019-04-16 PROCEDURE — 71275 CT ANGIOGRAPHY CHEST: CPT

## 2019-04-16 PROCEDURE — 84484 ASSAY OF TROPONIN QUANT: CPT

## 2019-04-16 PROCEDURE — 96133 NRPSYC TST EVAL PHYS/QHP EA: CPT

## 2019-04-16 PROCEDURE — 83880 ASSAY OF NATRIURETIC PEPTIDE: CPT

## 2019-04-16 PROCEDURE — 80053 COMPREHEN METABOLIC PANEL: CPT

## 2019-04-16 PROCEDURE — 99285 EMERGENCY DEPT VISIT HI MDM: CPT

## 2019-04-16 PROCEDURE — 93010 ELECTROCARDIOGRAM REPORT: CPT

## 2019-04-16 PROCEDURE — 85379 FIBRIN DEGRADATION QUANT: CPT

## 2019-04-16 PROCEDURE — 96132 NRPSYC TST EVAL PHYS/QHP 1ST: CPT

## 2019-04-16 PROCEDURE — 71046 X-RAY EXAM CHEST 2 VIEWS: CPT

## 2019-04-16 PROCEDURE — 36415 COLL VENOUS BLD VENIPUNCTURE: CPT

## 2019-04-16 RX ADMIN — Medication 0.5 MILLIGRAM(S): at 19:22

## 2019-04-16 NOTE — ED PROVIDER NOTE - PLAN OF CARE
Follow up with Dr Fabian for a post hospital visit, taking all results from the ER to be reviewed.   If any chest pains, shortness of breath, worsening, concerning or new signs or symptoms return to the ER

## 2019-04-16 NOTE — ED PROVIDER NOTE - ATTENDING CONTRIBUTION TO CARE
cc sob observed by neurologist   General:     NAD, well-nourished, well-appearing  Head:     NC/AT, EOMI, oral mucosa moist  Neck:     trachea midline  Lungs:     CTA b/l, no w/r/r  CVS:     S1S2, RRR, no m/g/r  Abd:     +BS, s/nt/nd, no organomegaly  Ext:    2+ radial and pedal pulses, no c/c/e  Neuro: AAOx3, no sensory/motor deficits   cxr nl , nl ekg trops nl d dimer elevated planning cta if neg d/c  case s/o dr malcolm Chong:  I have reviewed and discussed with the PA/ resident the case specifics, including the history, physical assessment, evaluation, conclusion, laboratory results, and medical plan. I agree with the contents, and conclusions. I have personally examined, and interviewed the patient.

## 2019-04-16 NOTE — ED ADULT TRIAGE NOTE - CHIEF COMPLAINT QUOTE
I was having some test done with the neurologist for my memory and she did not like the way I was breathing so they sent me here

## 2019-04-16 NOTE — ED PROVIDER NOTE - CARE PLAN
Principal Discharge DX:	Labored breathing Principal Discharge DX:	Labored breathing  Assessment and plan of treatment:	Follow up with Dr Fabian for a post hospital visit, taking all results from the ER to be reviewed.   If any chest pains, shortness of breath, worsening, concerning or new signs or symptoms return to the ER

## 2019-04-16 NOTE — ED PROVIDER NOTE - PROGRESS NOTE DETAILS
d dimer positive. cta ordered Pt resting comfortable, NAD.  CTA ng for PE.  Able to ambulate without desat.   Will dc home with fu with dr deng as per dr arechiga

## 2019-04-16 NOTE — ED ADULT NURSE NOTE - PSH
H/O: hemorrhoidectomy    History of arthroscopy of left knee    History of laminectomy    History of tonsillectomy

## 2019-04-16 NOTE — ED PROVIDER NOTE - OBJECTIVE STATEMENT
78 yr old male with hx of HTN, HLD, memory loss( pt being spence at neurologist) sent from neurologist off due to labored breathing. Pt was seen at neurologist today for follow up appt, pt was noted to have labored breathing by the nurse. Denies any chest  pain, fever, chills, n/v/d or any other symptoms.   cardiologistKelsey Iyer

## 2019-04-16 NOTE — ED PROVIDER NOTE - NSFOLLOWUPINSTRUCTIONS_ED_ALL_ED_FT
Follow up with Dr Fabian for a post hospital visit, taking all results from the ER to be reviewed.   If any chest pains, shortness of breath, worsening, concerning or new signs or symptoms return to the ER      WHAT YOU NEED TO KNOW:    What is shortness of breath? Shortness of breath is a feeling that you cannot get enough air when you breathe in. You may have this feeling only during activity, or all the time. Your symptoms can range from mild to severe. Shortness of breath may be a sign of a serious health condition that needs immediate care.    What causes or increases my risk for shortness of breath?     A lung condition, such as pneumonia, asthma, or a blood clot in your lung      Heart, kidney, or liver disease      Lung cancer or lung damage, such as from asbestos      Smoking cigarettes      Anxiety or a panic attack      Physical activity such as running or climbing stairs, especially if you are out of shape      Being overweight      Travel to high altitude      Anemia (low red blood cell count)    How is the cause of shortness of breath diagnosed? Your healthcare provider will listen to your breathing and check for signs of a serious health condition. Signs include blue lips or fingernails or chest pain that happens with shortness of breath. Tell your provider if shortness of breath keeps you from walking or talking easily. Your provider will also check your memory and alertness. Tell your provider when your shortness of breath started and how severe it is. Describe anything that starts your symptoms, such as physical activity. Also tell your provider anything you do to make breathing easier. You may also need any of the following:     Blood tests may be used to check your oxygen level or to find health conditions such as anemia. Anemia is too few red blood cells (RBCs). RBCs carry oxygen throughout your body. Blood tests may also be used to check for signs of infection or organ failure.      A pulse oximeter is a device that measures the amount of oxygen in your blood. A cord with a clip or sticky strip is placed on your finger, ear, or toe. The other end of the cord is hooked to a machine.       Spirometry is a test to measure how strong your breathing is. You will breathe out as hard as you can into a plastic tube called a spirometer.      X-ray pictures may show signs of infection or fluid around your heart or lungs.       Exercise tests help your healthcare provider learn if you have symptoms that limit activity. Symptoms include leg pain, fatigue, and weakness. Exercise tests can also show if your symptoms are caused by heart problems.       CT scan pictures may show blood clots or an area of disease in your lungs. You may be given contrast liquid to help your lungs show up better in the pictures. Tell the healthcare provider if you have ever had an allergic reaction to contrast liquid.       An echocardiogram is a type of ultrasound. Sound waves are used to show the structure and function of your heart.      An EKG is a test that measures the electrical activity of your heart.    How is shortness of breath treated?     Medicines may be used to treat the cause of your symptoms. You may need medicine to treat a bacterial infection or reduce anxiety. Other medicines may be used to open your airway, reduce swelling, or remove extra fluid. If you have a heart condition, you may need medicine to help your heart beat more strongly or regularly.      Oxygen may be given to help you breathe more easily.    What can I do to manage shortness of breath?     Create an action plan. You and your healthcare provider can work together to create a plan for how to handle shortness of breath. The plan can include daily activities, treatment changes, and what to do if you have severe breathing problems.      Lean forward on your elbows when you sit. This helps your lungs expand and may make it easier to breathe.      Use pursed-lip breathing any time you feel short of breath. Breathe in through your nose and then slowly breathe out through your mouth with your lips slightly puckered. It should take you twice as long to breathe out as it did to breathe in.Breathe in Breathe out           Do not smoke. Nicotine and other chemicals in cigarettes and cigars can cause lung damage and make shortness of breath worse. Ask your healthcare provider for information if you currently smoke and need help to quit. E-cigarettes or smokeless tobacco still contain nicotine. Talk to your healthcare provider before you use these products.      Reach or maintain a healthy weight. Your healthcare provider can help you create a safe weight loss plan if you are overweight.      Exercise as directed. Exercise can help your lungs work more easily. Exercise can also help you lose weight if needed. Try to get at least 30 minutes of exercise most days of the week.    When should I seek immediate care?     Your signs and symptoms are the same or worse within 24 hours of treatment.       The skin over your ribs or on your neck sinks in when you breathe.       You feel confused or dizzy.    When should I contact my healthcare provider?     You have new or worsening symptoms.      You have questions or concerns about your condition or care.

## 2019-04-16 NOTE — ED PROVIDER NOTE - CLINICAL SUMMARY MEDICAL DECISION MAKING FREE TEXT BOX
78 yr old male with hx of HTN, HLD, memory loss( pt being spence at neurologist) sent from neurologist off due to labored breathing. Pt was seen at neurologist today for follow up appt, pt was noted to have labored breathing by the nurse. Denies any chest  pain, fever, chills, n/v/d or any other symptoms.   cardiologist- sowmya hurd- Shireen   ekg, cbc, cmp, pro bnp, cxray, trop

## 2019-04-16 NOTE — ED ADULT NURSE NOTE - OBJECTIVE STATEMENT
Pt was seeing neurologist to address short term memory loss when neurologist noted "shallow breathing". Pt denies any symptoms on arrival to ED. Noted pt demonstrates increased effort of breathing during conversation.

## 2019-04-18 ENCOUNTER — APPOINTMENT (OUTPATIENT)
Dept: FAMILY MEDICINE | Facility: CLINIC | Age: 79
End: 2019-04-18
Payer: MEDICARE

## 2019-04-18 VITALS
TEMPERATURE: 97 F | DIASTOLIC BLOOD PRESSURE: 60 MMHG | RESPIRATION RATE: 16 BRPM | HEIGHT: 67 IN | BODY MASS INDEX: 25.11 KG/M2 | WEIGHT: 160 LBS | SYSTOLIC BLOOD PRESSURE: 144 MMHG | HEART RATE: 44 BPM | OXYGEN SATURATION: 97 %

## 2019-04-18 DIAGNOSIS — I45.4 NONSPECIFIC INTRAVENTRICULAR BLOCK: ICD-10-CM

## 2019-04-18 DIAGNOSIS — Z87.898 PERSONAL HISTORY OF OTHER SPECIFIED CONDITIONS: ICD-10-CM

## 2019-04-18 DIAGNOSIS — Z87.09 PERSONAL HISTORY OF OTHER DISEASES OF THE RESPIRATORY SYSTEM: ICD-10-CM

## 2019-04-18 DIAGNOSIS — Z98.890 OTHER SPECIFIED POSTPROCEDURAL STATES: ICD-10-CM

## 2019-04-18 DIAGNOSIS — Z87.39 PERSONAL HISTORY OF OTHER DISEASES OF THE MUSCULOSKELETAL SYSTEM AND CONNECTIVE TISSUE: ICD-10-CM

## 2019-04-18 DIAGNOSIS — R06.02 SHORTNESS OF BREATH: ICD-10-CM

## 2019-04-18 PROCEDURE — 93000 ELECTROCARDIOGRAM COMPLETE: CPT

## 2019-04-18 PROCEDURE — G0405: CPT

## 2019-04-18 PROCEDURE — 99214 OFFICE O/P EST MOD 30 MIN: CPT | Mod: 25

## 2019-04-18 NOTE — HEALTH RISK ASSESSMENT
[Intercurrent ED visits] : went to ED [No falls in past year] : Patient reported no falls in the past year [0] : 2) Feeling down, depressed, or hopeless: Not at all (0) [] : No [[id number]] : 0 [de-identified] : diff breathing

## 2019-04-18 NOTE — COUNSELING
[Healthy eating counseling provided] : healthy eating [Activity counseling provided] : activity [Fall prevention counseling provided] : fall prevention  [Low Salt Diet] : Low salt diet [None] : None [Walking] : Walking [Good understanding] : Patient has a good understanding of lifestyle changes and the steps needed to achieve self management goals [de-identified] : d/w pt discrepancies in pulses take nad also on exam EKG done no acute changes pt totally asymoptomatic; has apt cardiology tomorrow will f/u there and ? ECHO needs to be done as per wife\par sent for PFTs as well

## 2019-04-18 NOTE — HEALTH RISK ASSESSMENT
[Intercurrent ED visits] : went to ED [No falls in past year] : Patient reported no falls in the past year [0] : 1) Little interest or pleasure doing things: Not at all (0) [] : No [de-identified] : diff breathing  [[id number]] : 0

## 2019-04-18 NOTE — PHYSICAL EXAM
[No Acute Distress] : no acute distress [Clear to Auscultation] : lungs were clear to auscultation bilaterally [No Respiratory Distress] : no respiratory distress  [No Accessory Muscle Use] : no accessory muscle use [Regular Rhythm] : with a regular rhythm [Normal S1, S2] : normal S1 and S2 [Non Tender] : non-tender [Soft] : abdomen soft [Non-distended] : non-distended [No HSM] : no HSM [No Masses] : no abdominal mass palpated [No Joint Swelling] : no joint swelling [Grossly Normal Strength/Tone] : grossly normal strength/tone [de-identified] : with bilateral ankle braces and cane to ambulate [de-identified] : HR went to 58 at time of exam on EKG 80 [de-identified] : when talks for sometime does have heavy breathing but to pt's states that is is his normal does not feel SOB no leg edema no other issues [de-identified] : right foot hammer toe 2nd toe

## 2019-04-18 NOTE — HISTORY OF PRESENT ILLNESS
[FreeTextEntry1] : f/u ER Visit [de-identified] : 79 y/o PMHX DM2 gait instability HTN HLD recent ER visit for diff breathing

## 2019-04-18 NOTE — COUNSELING
[Healthy eating counseling provided] : healthy eating [Fall prevention counseling provided] : fall prevention  [Activity counseling provided] : activity [Low Salt Diet] : Low salt diet [Good understanding] : Patient has a good understanding of lifestyle changes and the steps needed to achieve self management goals [None] : None [Walking] : Walking [de-identified] : d/w pt discrepancies in pulses take nad also on exam EKG done no acute changes pt totally asymoptomatic; has apt cardiology tomorrow will f/u there and ? ECHO needs to be done as per wife\par sent for PFTs as well

## 2019-04-18 NOTE — HISTORY OF PRESENT ILLNESS
[FreeTextEntry1] : f/u ER Visit [de-identified] : 79 y/o PMHX DM2 gait instability HTN HLD recent ER visit for diff breathing

## 2019-04-18 NOTE — PHYSICAL EXAM
[No Acute Distress] : no acute distress [No Accessory Muscle Use] : no accessory muscle use [No Respiratory Distress] : no respiratory distress  [Clear to Auscultation] : lungs were clear to auscultation bilaterally [Regular Rhythm] : with a regular rhythm [Normal S1, S2] : normal S1 and S2 [Soft] : abdomen soft [Non Tender] : non-tender [Non-distended] : non-distended [No Masses] : no abdominal mass palpated [No HSM] : no HSM [Grossly Normal Strength/Tone] : grossly normal strength/tone [No Joint Swelling] : no joint swelling [de-identified] : with bilateral ankle braces and cane to ambulate [de-identified] : when talks for sometime does have heavy breathing but to pt's states that is is his normal does not feel SOB no leg edema no other issues [de-identified] : HR went to 58 at time of exam on EKG 80 [de-identified] : right foot hammer toe 2nd toe

## 2019-04-19 ENCOUNTER — APPOINTMENT (OUTPATIENT)
Dept: CARDIOLOGY | Facility: CLINIC | Age: 79
End: 2019-04-19
Payer: MEDICARE

## 2019-04-19 ENCOUNTER — NON-APPOINTMENT (OUTPATIENT)
Age: 79
End: 2019-04-19

## 2019-04-19 VITALS
RESPIRATION RATE: 17 BRPM | WEIGHT: 160 LBS | HEIGHT: 67 IN | BODY MASS INDEX: 25.11 KG/M2 | SYSTOLIC BLOOD PRESSURE: 118 MMHG | DIASTOLIC BLOOD PRESSURE: 71 MMHG | HEART RATE: 56 BPM | OXYGEN SATURATION: 99 %

## 2019-04-19 PROCEDURE — 99215 OFFICE O/P EST HI 40 MIN: CPT

## 2019-04-19 PROCEDURE — 93306 TTE W/DOPPLER COMPLETE: CPT

## 2019-04-19 PROCEDURE — 93000 ELECTROCARDIOGRAM COMPLETE: CPT

## 2019-04-21 NOTE — ASSESSMENT
[FreeTextEntry1] : We suspect that there may be a component of diastolic heart failure given an elevation in BNP a brain nature of peptide of 398. Furosemide is therefore instituted for a component of diastolic heart failure \par \par medical necessity \par this represents a high-risk encounter based upon a new diagnosis of diastolic heart failure which requires institution of a new medication a diuretic furosemide

## 2019-04-21 NOTE — PHYSICAL EXAM
[General Appearance - Well Developed] : well developed [Normal Appearance] : normal appearance [Well Groomed] : well groomed [General Appearance - Well Nourished] : well nourished [No Deformities] : no deformities [General Appearance - In No Acute Distress] : no acute distress [Normal Conjunctiva] : the conjunctiva exhibited no abnormalities [Eyelids - No Xanthelasma] : the eyelids demonstrated no xanthelasmas [Normal Oral Mucosa] : normal oral mucosa [No Oral Pallor] : no oral pallor [No Oral Cyanosis] : no oral cyanosis [Normal Jugular Venous A Waves Present] : normal jugular venous A waves present [Normal Jugular Venous V Waves Present] : normal jugular venous V waves present [No Jugular Venous Telles A Waves] : no jugular venous telles A waves [Respiration, Rhythm And Depth] : normal respiratory rhythm and effort [Exaggerated Use Of Accessory Muscles For Inspiration] : no accessory muscle use [Auscultation Breath Sounds / Voice Sounds] : lungs were clear to auscultation bilaterally [Heart Rate And Rhythm] : heart rate and rhythm were normal [Murmurs] : no murmurs present [Heart Sounds] : normal S1 and S2 [Abdomen Soft] : soft [Abdomen Tenderness] : non-tender [Abdomen Mass (___ Cm)] : no abdominal mass palpated [Abnormal Walk] : normal gait [Nail Clubbing] : no clubbing of the fingernails [Cyanosis, Localized] : no localized cyanosis [Petechial Hemorrhages (___cm)] : no petechial hemorrhages [Skin Color & Pigmentation] : normal skin color and pigmentation [] : no rash [Skin Lesions] : no skin lesions [No Venous Stasis] : no venous stasis [No Skin Ulcers] : no skin ulcer [No Xanthoma] : no  xanthoma was observed [Oriented To Time, Place, And Person] : oriented to person, place, and time [Affect] : the affect was normal [Mood] : the mood was normal [No Anxiety] : not feeling anxious [FreeTextEntry1] : uses  a cane

## 2019-04-21 NOTE — REASON FOR VISIT
[Follow-Up - Clinic] : a clinic follow-up of [Coronary Artery Disease] : coronary artery disease [Heart Failure] : congestive heart failure [Medication Management] : Medication management [Spouse] : spouse [FreeTextEntry1] : Mati had shorntess of breath. He was instructed to undergo Xray cat scan PFTs and was sent to er [facility] Mati was seen in the ER 4/16/19 and was discharged to see cardiology as an outpatient. the Bloodwork was performed demonstrates a D dimer of 302 blood sugar 110 h troponin less than .017 a CT aNGiO was negative for pulmonary embolism . there was no lobar consolidation or acute diagnostic finding and x-ray failed to reveal evidence of pulmonary disease .  An echo is performed which demonstrates preserved left ventricular function.

## 2019-04-24 ENCOUNTER — OUTPATIENT (OUTPATIENT)
Dept: OUTPATIENT SERVICES | Facility: HOSPITAL | Age: 79
LOS: 1 days | End: 2019-04-24
Payer: MEDICARE

## 2019-04-24 DIAGNOSIS — Z98.89 OTHER SPECIFIED POSTPROCEDURAL STATES: Chronic | ICD-10-CM

## 2019-04-24 DIAGNOSIS — R06.02 SHORTNESS OF BREATH: ICD-10-CM

## 2019-04-24 PROCEDURE — 94729 DIFFUSING CAPACITY: CPT | Mod: 26

## 2019-04-24 PROCEDURE — 94060 EVALUATION OF WHEEZING: CPT

## 2019-04-24 PROCEDURE — 94726 PLETHYSMOGRAPHY LUNG VOLUMES: CPT | Mod: 26

## 2019-04-24 PROCEDURE — 94060 EVALUATION OF WHEEZING: CPT | Mod: 26

## 2019-04-24 PROCEDURE — 94640 AIRWAY INHALATION TREATMENT: CPT

## 2019-04-24 RX ORDER — ALBUTEROL 90 UG/1
2.5 AEROSOL, METERED ORAL ONCE
Qty: 0 | Refills: 0 | Status: COMPLETED | OUTPATIENT
Start: 2019-04-24 | End: 2019-04-24

## 2019-04-24 RX ADMIN — ALBUTEROL 2.5 MILLIGRAM(S): 90 AEROSOL, METERED ORAL at 13:35

## 2019-04-30 ENCOUNTER — APPOINTMENT (OUTPATIENT)
Dept: NEUROLOGY | Facility: CLINIC | Age: 79
End: 2019-04-30

## 2019-05-06 ENCOUNTER — FORM ENCOUNTER (OUTPATIENT)
Age: 79
End: 2019-05-06

## 2019-05-07 ENCOUNTER — APPOINTMENT (OUTPATIENT)
Dept: CARDIOLOGY | Facility: CLINIC | Age: 79
End: 2019-05-07

## 2019-05-07 ENCOUNTER — OUTPATIENT (OUTPATIENT)
Dept: OUTPATIENT SERVICES | Facility: HOSPITAL | Age: 79
LOS: 1 days | End: 2019-05-07
Payer: MEDICARE

## 2019-05-07 DIAGNOSIS — R06.02 SHORTNESS OF BREATH: ICD-10-CM

## 2019-05-07 DIAGNOSIS — Z98.89 OTHER SPECIFIED POSTPROCEDURAL STATES: Chronic | ICD-10-CM

## 2019-05-07 DIAGNOSIS — I25.10 ATHEROSCLEROTIC HEART DISEASE OF NATIVE CORONARY ARTERY WITHOUT ANGINA PECTORIS: ICD-10-CM

## 2019-05-07 PROCEDURE — 75571 CT HRT W/O DYE W/CA TEST: CPT | Mod: 26

## 2019-05-07 PROCEDURE — 75571 CT HRT W/O DYE W/CA TEST: CPT

## 2019-05-09 ENCOUNTER — OUTPATIENT (OUTPATIENT)
Dept: OUTPATIENT SERVICES | Facility: HOSPITAL | Age: 79
LOS: 1 days | End: 2019-05-09
Payer: MEDICARE

## 2019-05-09 VITALS
HEART RATE: 64 BPM | TEMPERATURE: 98 F | OXYGEN SATURATION: 100 % | SYSTOLIC BLOOD PRESSURE: 157 MMHG | RESPIRATION RATE: 16 BRPM | WEIGHT: 160.06 LBS | DIASTOLIC BLOOD PRESSURE: 79 MMHG | HEIGHT: 66 IN

## 2019-05-09 DIAGNOSIS — Z98.89 OTHER SPECIFIED POSTPROCEDURAL STATES: Chronic | ICD-10-CM

## 2019-05-09 DIAGNOSIS — R94.39 ABNORMAL RESULT OF OTHER CARDIOVASCULAR FUNCTION STUDY: ICD-10-CM

## 2019-05-09 LAB
ALBUMIN SERPL ELPH-MCNC: 4 G/DL — SIGNIFICANT CHANGE UP (ref 3.3–5)
ALP SERPL-CCNC: 81 U/L — SIGNIFICANT CHANGE UP (ref 40–120)
ALT FLD-CCNC: 14 U/L — SIGNIFICANT CHANGE UP (ref 10–45)
ANION GAP SERPL CALC-SCNC: 12 MMOL/L — SIGNIFICANT CHANGE UP (ref 5–17)
AST SERPL-CCNC: 19 U/L — SIGNIFICANT CHANGE UP (ref 10–40)
BILIRUB SERPL-MCNC: 0.3 MG/DL — SIGNIFICANT CHANGE UP (ref 0.2–1.2)
BUN SERPL-MCNC: 25 MG/DL — HIGH (ref 7–23)
CALCIUM SERPL-MCNC: 9.3 MG/DL — SIGNIFICANT CHANGE UP (ref 8.4–10.5)
CHLORIDE SERPL-SCNC: 107 MMOL/L — SIGNIFICANT CHANGE UP (ref 96–108)
CO2 SERPL-SCNC: 22 MMOL/L — SIGNIFICANT CHANGE UP (ref 22–31)
CREAT SERPL-MCNC: 1.29 MG/DL — SIGNIFICANT CHANGE UP (ref 0.5–1.3)
GLUCOSE SERPL-MCNC: 110 MG/DL — HIGH (ref 70–99)
HCT VFR BLD CALC: 42 % — SIGNIFICANT CHANGE UP (ref 39–50)
HGB BLD-MCNC: 14.6 G/DL — SIGNIFICANT CHANGE UP (ref 13–17)
MCHC RBC-ENTMCNC: 34.7 GM/DL — SIGNIFICANT CHANGE UP (ref 32–36)
MCHC RBC-ENTMCNC: 36.4 PG — HIGH (ref 27–34)
MCV RBC AUTO: 105 FL — HIGH (ref 80–100)
PLATELET # BLD AUTO: 152 K/UL — SIGNIFICANT CHANGE UP (ref 150–400)
POTASSIUM SERPL-MCNC: 4.3 MMOL/L — SIGNIFICANT CHANGE UP (ref 3.5–5.3)
POTASSIUM SERPL-SCNC: 4.3 MMOL/L — SIGNIFICANT CHANGE UP (ref 3.5–5.3)
PROT SERPL-MCNC: 6.5 G/DL — SIGNIFICANT CHANGE UP (ref 6–8.3)
RBC # BLD: 4.01 M/UL — LOW (ref 4.2–5.8)
RBC # FLD: 12.5 % — SIGNIFICANT CHANGE UP (ref 10.3–14.5)
SODIUM SERPL-SCNC: 141 MMOL/L — SIGNIFICANT CHANGE UP (ref 135–145)
WBC # BLD: 5 K/UL — SIGNIFICANT CHANGE UP (ref 3.8–10.5)
WBC # FLD AUTO: 5 K/UL — SIGNIFICANT CHANGE UP (ref 3.8–10.5)

## 2019-05-09 PROCEDURE — C1887: CPT

## 2019-05-09 PROCEDURE — 80053 COMPREHEN METABOLIC PANEL: CPT

## 2019-05-09 PROCEDURE — 99152 MOD SED SAME PHYS/QHP 5/>YRS: CPT

## 2019-05-09 PROCEDURE — 93010 ELECTROCARDIOGRAM REPORT: CPT

## 2019-05-09 PROCEDURE — 99152 MOD SED SAME PHYS/QHP 5/>YRS: CPT | Mod: GC

## 2019-05-09 PROCEDURE — 85027 COMPLETE CBC AUTOMATED: CPT

## 2019-05-09 PROCEDURE — 93458 L HRT ARTERY/VENTRICLE ANGIO: CPT | Mod: 26,GC

## 2019-05-09 PROCEDURE — C1894: CPT

## 2019-05-09 PROCEDURE — 99203 OFFICE O/P NEW LOW 30 MIN: CPT

## 2019-05-09 PROCEDURE — 93005 ELECTROCARDIOGRAM TRACING: CPT

## 2019-05-09 PROCEDURE — 93458 L HRT ARTERY/VENTRICLE ANGIO: CPT

## 2019-05-09 PROCEDURE — C1769: CPT

## 2019-05-09 RX ORDER — SODIUM CHLORIDE 9 MG/ML
250 INJECTION INTRAMUSCULAR; INTRAVENOUS; SUBCUTANEOUS ONCE
Refills: 0 | Status: DISCONTINUED | OUTPATIENT
Start: 2019-05-09 | End: 2019-05-24

## 2019-05-09 NOTE — H&P CARDIOLOGY - HISTORY OF PRESENT ILLNESS
79 y/o male with PMHx of HLD, DM type 2,Diet controlled, DVT in 2013 present today  for LHC. Patient had SOB especially with exertion, Was seen and evaluated by Dr. Fabian, CT coronaries done which showed calcification of coronary arteries and patient was referred for  LHC. Denies chest pain, palpitation dizziness/ syncope. According to wife, patient was on Lipitor until few weeks ago, but was stopped by DR. Fabian dur to recent short term memory loss.   .CTA chest; from April 2019:  75 cc Omnipaque 350 injected intravenously.  Axial images augmented by coronal sagittal reformats, 3-D MIP images.   Prior 1/28/2019.  Satisfactory contrast bolus. No pulmonary emboli. Nonaneurysmal thoracic   aorta. Central airways patent. No mediastinal adenopathy.  No lobar consolidation or suspicious nodularity. Stable calcified   granuloma right midlung.  No pleural or pericardial effusion.  Normal heart size with coronary artery calcification.  Bilateral renal cysts similar to prior.  Stable osseous structures. 77 y/o male with PMHx of HLD, DM type 2,Diet controlled, DVT in 2013 present today  for LHC. Patient had SOB especially with exertion, was seen and evaluated by Dr. Fabian, CT coronaries done which showed calcification of coronary arteries and patient was referred for  LHC. Denies chest pain, palpitation dizziness/ syncope. According to wife, patient was on Lipitor until few weeks ago, but was stopped by Dr. Fabian dur to recent short term memory loss.   .CTA chest; from April 2019:  75 cc Omnipaque 350 injected intravenously.  Axial images augmented by coronal sagittal reformats, 3-D MIP images.   Prior 1/28/2019.  Satisfactory contrast bolus. No pulmonary emboli. Nonaneurysmal thoracic   aorta. Central airways patent. No mediastinal adenopathy.  No lobar consolidation or suspicious nodularity. Stable calcified   granuloma right midlung.  No pleural or pericardial effusion.  Normal heart size with coronary artery calcification.  Bilateral renal cysts similar to prior.  Stable osseous structures.

## 2019-05-17 ENCOUNTER — NON-APPOINTMENT (OUTPATIENT)
Age: 79
End: 2019-05-17

## 2019-05-17 ENCOUNTER — APPOINTMENT (OUTPATIENT)
Dept: CARDIOLOGY | Facility: CLINIC | Age: 79
End: 2019-05-17
Payer: MEDICARE

## 2019-05-17 VITALS
WEIGHT: 158 LBS | OXYGEN SATURATION: 99 % | RESPIRATION RATE: 17 BRPM | HEART RATE: 65 BPM | HEIGHT: 67 IN | BODY MASS INDEX: 24.8 KG/M2 | DIASTOLIC BLOOD PRESSURE: 70 MMHG | SYSTOLIC BLOOD PRESSURE: 126 MMHG

## 2019-05-17 PROCEDURE — 93000 ELECTROCARDIOGRAM COMPLETE: CPT

## 2019-05-17 PROCEDURE — 99214 OFFICE O/P EST MOD 30 MIN: CPT

## 2019-05-18 NOTE — DISCUSSION/SUMMARY
[Coronary Artery Disease] : coronary artery disease [Stable] : stable [Diastolic Heart Failure] : diastolic congestive heart failure [Chronic Diastolic Heart Failure] : chronic diastolic congestive heart failure [Responding to Treatment] : responding to treatment [de-identified] : diuretic tiw

## 2019-05-18 NOTE — REASON FOR VISIT
[Follow-Up - Clinic] : a clinic follow-up of [Coronary Artery Disease] : coronary artery disease [Heart Failure] : congestive heart failure [Hyperlipidemia] : hyperlipidemia [Spouse] : spouse [FreeTextEntry1] : We have reviewed a recent chronic catheterization which demonstrates non-obstructive coronary disease most likely the severe calcium score reflects extra luminal calcium that being said given recent bout of heart failure with continual low dose diuretic of furosemide 20 mg three days a week Monday Wednesday and Friday

## 2019-05-28 ENCOUNTER — APPOINTMENT (OUTPATIENT)
Dept: NEUROLOGY | Facility: CLINIC | Age: 79
End: 2019-05-28
Payer: MEDICARE

## 2019-05-28 PROCEDURE — 96133 NRPSYC TST EVAL PHYS/QHP EA: CPT

## 2019-05-28 PROCEDURE — 96139 PSYCL/NRPSYC TST TECH EA: CPT | Mod: NC

## 2019-06-11 ENCOUNTER — APPOINTMENT (OUTPATIENT)
Dept: FAMILY MEDICINE | Facility: CLINIC | Age: 79
End: 2019-06-11
Payer: MEDICARE

## 2019-06-11 VITALS
DIASTOLIC BLOOD PRESSURE: 60 MMHG | RESPIRATION RATE: 16 BRPM | TEMPERATURE: 97.6 F | WEIGHT: 160 LBS | HEART RATE: 80 BPM | BODY MASS INDEX: 25.11 KG/M2 | OXYGEN SATURATION: 97 % | HEIGHT: 67 IN | SYSTOLIC BLOOD PRESSURE: 120 MMHG

## 2019-06-11 PROCEDURE — 99214 OFFICE O/P EST MOD 30 MIN: CPT

## 2019-06-12 NOTE — HEALTH RISK ASSESSMENT
[] : No [de-identified] : no [[id number]] : 0 [0] : 2) Feeling down, depressed, or hopeless: Not at all (0)

## 2019-06-12 NOTE — DATA REVIEWED
[FreeTextEntry1] : went over cardiology note s/p cath and calcium scores\par went over MRA repeat in 6 months per neuro \par Wife had several questions that were answered with her apparent understanding. Worried about her

## 2019-06-12 NOTE — COUNSELING
[Activity counseling provided] : activity [Healthy eating counseling provided] : healthy eating [Fall prevention counseling provided] : fall prevention  [Limited decision making ability] : Limited decision making ability [Other: ____] : [unfilled] [Good understanding] : Patient has a good understanding of lifestyle changes and the steps needed to achieve self management goals

## 2019-06-12 NOTE — HISTORY OF PRESENT ILLNESS
[FreeTextEntry1] : f/u [de-identified] : 79 y/o PMHX DM2 HLD CHF Memory declined recent MRA showed multiple small aneurysm, gait instability foot drop, CBP here for f/u states is doing well denies pain no HA r additional complaints Able to do ADLs  [Spouse] : spouse

## 2019-06-12 NOTE — PHYSICAL EXAM
[No Acute Distress] : no acute distress [No JVD] : no jugular venous distention [Well-Appearing] : well-appearing [Supple] : supple [No Respiratory Distress] : no respiratory distress  [Clear to Auscultation] : lungs were clear to auscultation bilaterally [No Accessory Muscle Use] : no accessory muscle use [Normal Rate] : normal rate  [Regular Rhythm] : with a regular rhythm [Normal S1, S2] : normal S1 and S2 [Soft] : abdomen soft [Non Tender] : non-tender [No CVA Tenderness] : no CVA  tenderness [Non-distended] : non-distended [No Spinal Tenderness] : no spinal tenderness [No Joint Swelling] : no joint swelling [No Rash] : no rash [Right Foot Was Examined] : Right foot ~C was examined [Left Foot Was Examined] : left foot ~C was examined [] : both feet [None] : no ulcers in either foot were found [de-identified] : in NAD [place] [de-identified] : dropped foot  [de-identified] : ataxic gait uses bilateral foot braces and cane  [de-identified] : dropped foot bilaterally no skin lesions or tear passed monofilament testing

## 2019-06-19 NOTE — ED PROVIDER NOTE - RESPIRATORY [+], MLM
What Type Of Note Output Would You Prefer (Optional)?: Bullet Format How Severe Is Your Skin Lesion?: moderate Has Your Skin Lesion Been Treated?: not been treated Is This A New Presentation, Or A Follow-Up?: Skin Lesion SHORTNESS OF BREATH/labored breathing

## 2019-06-20 ENCOUNTER — APPOINTMENT (OUTPATIENT)
Dept: NEUROLOGY | Facility: CLINIC | Age: 79
End: 2019-06-20

## 2019-06-24 ENCOUNTER — APPOINTMENT (OUTPATIENT)
Dept: NEUROLOGY | Facility: CLINIC | Age: 79
End: 2019-06-24
Payer: MEDICARE

## 2019-06-24 PROCEDURE — 96133 NRPSYC TST EVAL PHYS/QHP EA: CPT

## 2019-07-08 ENCOUNTER — APPOINTMENT (OUTPATIENT)
Dept: NEUROLOGY | Facility: CLINIC | Age: 79
End: 2019-07-08
Payer: MEDICARE

## 2019-07-08 VITALS
WEIGHT: 158 LBS | HEIGHT: 67 IN | HEART RATE: 74 BPM | SYSTOLIC BLOOD PRESSURE: 176 MMHG | DIASTOLIC BLOOD PRESSURE: 65 MMHG | BODY MASS INDEX: 24.8 KG/M2

## 2019-07-08 PROCEDURE — 99214 OFFICE O/P EST MOD 30 MIN: CPT

## 2019-07-08 NOTE — PHYSICAL EXAM
[General Appearance - In No Acute Distress] : in no acute distress [General Appearance - Alert] : alert [Oriented To Time, Place, And Person] : oriented to person, place, and time [Impaired Insight] : insight and judgment were intact [Affect] : the affect was normal [Person] : oriented to person [Place] : oriented to place [Time] : oriented to time [Concentration Intact] : normal concentrating ability [Visual Intact] : visual attention was ~T not ~L decreased [Naming Objects] : no difficulty naming common objects [Repeating Phrases] : no difficulty repeating a phrase [Fluency] : fluency intact [Writing A Sentence] : no difficulty writing a sentence [Comprehension] : comprehension intact [Reading] : reading intact [Total Score ___ / 30] : the patient achieved a score of [unfilled] /30 [Past History] : adequate knowledge of personal past history [Date / Time ___ / 5] : date / time [unfilled] / 5 [Place ___ / 5] : place [unfilled] / 5 [Registration ___ / 3] : registration [unfilled] / 3 [Serial Sevens ___/5] : serial sevens [unfilled] / 5 [Naming 2 Objects ___ / 2] : naming two objects [unfilled] / 2 [Repeating a Sentence ___ / 1] : repeating a sentence [unfilled] / 1 [Writing a Sentence ___ / 1] : write sentence [unfilled] / 1 [3-stage Verbal Command ___ / 3] : three-stage verbal command [unfilled] / 3 [Written Command ___ / 1] : written command [unfilled] / 1 [Copy a Design ___ / 1] : copy a design [unfilled] / 1 [Recall ___ / 3] : recall [unfilled] / 3 [Cranial Nerves Optic (II)] : visual acuity intact bilaterally,  visual fields full to confrontation, pupils equal round and reactive to light [Cranial Nerves Oculomotor (III)] : extraocular motion intact [Cranial Nerves Trigeminal (V)] : facial sensation intact symmetrically [Cranial Nerves Facial (VII)] : face symmetrical [Cranial Nerves Vestibulocochlear (VIII)] : hearing was intact bilaterally [Cranial Nerves Glossopharyngeal (IX)] : tongue and palate midline [Cranial Nerves Accessory (XI - Cranial And Spinal)] : head turning and shoulder shrug symmetric [Cranial Nerves Hypoglossal (XII)] : there was no tongue deviation with protrusion [Motor Strength] : muscle strength was normal in all four extremities [Involuntary Movements] : no involuntary movements were seen [Motor Handedness Right-Handed] : the patient is right hand dominant [No Muscle Atrophy] : normal bulk in all four extremities [Sensation Tactile Decrease] : light touch was intact [Sensation Pain / Temperature Decrease] : pain and temperature was intact [Proprioception] : proprioception was intact [Romberg's Sign] : a positive Romberg's sign was present [Balance] : balance was intact [Limited Balance] : the patient's balance was impaired [1+] : Brachioradialis left 1+ [0] : Ankle jerk left 0 [Sclera] : the sclera and conjunctiva were normal [Extraocular Movements] : extraocular movements were intact [PERRL With Normal Accommodation] : pupils were equal in size, round, reactive to light, with normal accommodation [Optic Disc Abnormality] : the optic disc were normal in size and color [No APD] : no afferent pupillary defect [No HELGA] : no internuclear ophthalmoplegia [Full Visual Field] : full visual field [Outer Ear] : the ears and nose were normal in appearance [Oropharynx] : the oropharynx was normal [Neck Appearance] : the appearance of the neck was normal [Neck Cervical Mass (___cm)] : no neck mass was observed [Jugular Venous Distention Increased] : there was no jugular-venous distention [Thyroid Diffuse Enlargement] : the thyroid was not enlarged [Thyroid Nodule] : there were no palpable thyroid nodules [Auscultation Breath Sounds / Voice Sounds] : lungs were clear to auscultation bilaterally [Heart Rate And Rhythm] : heart rate was normal and rhythm regular [Heart Sounds] : normal S1 and S2 [Heart Sounds Gallop] : no gallops [Murmurs] : no murmurs [Heart Sounds Pericardial Friction Rub] : no pericardial rub [Arterial Pulses Carotid] : carotid pulses were normal with no bruits [Full Pulse] : the pedal pulses are present [Edema] : there was no peripheral edema [Bowel Sounds] : normal bowel sounds [Abdomen Soft] : soft [Abdomen Tenderness] : non-tender [Abdomen Mass (___ Cm)] : no abdominal mass palpated [No CVA Tenderness] : no ~M costovertebral angle tenderness [No Spinal Tenderness] : no spinal tenderness [Nail Clubbing] : no clubbing  or cyanosis of the fingernails [Musculoskeletal - Swelling] : no joint swelling seen [Motor Tone] : muscle strength and tone were normal [Skin Color & Pigmentation] : normal skin color and pigmentation [Skin Turgor] : normal skin turgor [] : no rash [Motor Strength Upper Extremities Bilaterally] : strength was normal in both upper extremities [Motor Strength Lower Extremities Bilaterally] : strength was normal in both lower extremities [Allodynia] : no ~T allodynia present [Dysesthesia] : no dysesthesia [Hyperesthesia] : no hyperesthesia [Past-pointing] : there was no past-pointing [Tremor] : no tremor present [Plantar Reflex Right Only] : normal on the right [Plantar Reflex Left Only] : normal on the left [FreeTextEntry4] : Mental Status Exam\par Presidents: 0/5\par Alternating Pattern: ok\par Spiral: ok\par Clock: 3/3\par Repetition: ok\par R/L discrimination on self and examiner: ok\par Cross-line commands: ok\par Praxis: \par -Motor: ok\par -Dynamic/Luria: ok\par -Ideomotor/Imitation: ok \par -Ideational/writing/closing-in: ok\par -Dressing: ok. [FreeTextEntry6] : Foot drop bilat, uses braces [FreeTextEntry7] : loss of vib sensation in UE and LE distally [FreeTextEntry8] : walks with cane, stepping gait, cautioned [FreeTextEntry1] : bilat cataracts

## 2019-07-08 NOTE — HISTORY OF PRESENT ILLNESS
[FreeTextEntry1] : HPI: 79yo RH WM, with HTN, HLD, diet controlled DM II, ? CAD, here for report of forgetfulness. Radiculopathy in LE, with drop foot bilat, due to L4-L5 herniation (laminectomy in 2012).\par \par PMH: Pt seen by Dr. Levine recently.\par Per wife, he has shown, over the last 6-12 months, STM issues, forgetting recent events, upcoming appointments. No problems with names, locations, faces.\par No changes in behavior.\par Imaging and NPT done recently. 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 \par \par Motor symptoms: limited by LE radiculopathy, uses braces\par \par B/B: no issues, some polyuria. \par \par Psychiatric symptoms: no issues. \par \par ADL: intact\par IADL: full, drives, no issues\par CDR: 0.5.\par \par Professional status: retired, former .\par \par PCP and other physicians:\par -PCP: Carlito\par -Neuro: Abner.

## 2019-07-08 NOTE — ASSESSMENT
[FreeTextEntry1] : Assessment:\par 77yo RH WM, with vascular risk factors, PMH of laminectomy and residual foot drops, ataxic gait (pre DM), here with concerns of memory problems.\par Exam is remarkable for STM issues, mild. AS in NPT and MRI brain, likely aMCI from AD pathology.\par Gait is impaired by radiculopathy, foot drop and loss of vib sensation in UE and LE, possible initial sensory neuropathy. 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\par -ataxic gait/sensory neuropathy/radiculopathy.\par \par Plan:\par -Aricept 5mg\par -would consider for trials, when available, pt will talk with children and will call me back\par -encourage activity.\par \par A thorough discussion was entertained with the patient/caregiver regarding the use of psychoactive medications, their possible benefits and AE profile, including the risk of cardiovascular complications.\par We discussed the benefits of being active, physically and mentally, and the need to to establish a routine in this respect.\par Driving abilities and firearms possession and use were discussed, in relation to progression of the cognitive decline, and the need to assess them periodically.\par Patient/caregiver understand and agree with the plan.\par

## 2019-07-08 NOTE — DATA REVIEWED
[de-identified] : EXAM: MR BRAIN \par \par \par PROCEDURE DATE: 12/05/2018 \par \par \par \par INTERPRETATION: CLINICAL STATEMENT: Increased memory loss \par \par TECHNIQUE: MRI of the brain was performed without gadolinium. \par \par COMPARISON: None. \par \par FINDINGS: \par There is moderate diffuse parenchymal volume loss. There are T2 prolongation \par signal abnormalities in the periventricular subcortical white matter likely \par related to mild chronic microvascular ischemic changes. \par \par There is no acute parenchymal hemorrhage, parenchymal mass, mass effect or \par midline shift. There is no extra-axial fluid collection. There is no \par hydrocephalus. There is no acute infarct. Partial empty sella \par \par Loss of normal flow void in the intradural segment of distal right vertebral \par artery noted. \par \par Mucosal thickening paranasal sinuses \par \par IMPRESSION: \par No acute intracranial hemorrhage or acute infarct. \par \par Loss of normal flow void in the intradural segment of distal right vertebral \par artery which may be related to occlusion/high-grade stenosis. \par \par \par \par \par \par \par \par \par \par ERYN GUPTA M.D., ATTENDING RADIOLOGIST \par This document has been electronically signed. Dec 5 2018 2:33PM \par  [de-identified] : See chart. [de-identified] : Labs done, all wnl.

## 2019-08-05 ENCOUNTER — RX RENEWAL (OUTPATIENT)
Age: 79
End: 2019-08-05

## 2019-09-09 ENCOUNTER — APPOINTMENT (OUTPATIENT)
Dept: NEUROLOGY | Facility: CLINIC | Age: 79
End: 2019-09-09

## 2019-09-11 ENCOUNTER — APPOINTMENT (OUTPATIENT)
Dept: CARDIOLOGY | Facility: CLINIC | Age: 79
End: 2019-09-11
Payer: MEDICARE

## 2019-09-11 VITALS
WEIGHT: 142 LBS | BODY MASS INDEX: 22.29 KG/M2 | DIASTOLIC BLOOD PRESSURE: 57 MMHG | HEART RATE: 82 BPM | OXYGEN SATURATION: 100 % | SYSTOLIC BLOOD PRESSURE: 158 MMHG | HEIGHT: 67 IN | RESPIRATION RATE: 16 BRPM

## 2019-09-11 PROCEDURE — 99213 OFFICE O/P EST LOW 20 MIN: CPT

## 2019-09-11 PROCEDURE — 93000 ELECTROCARDIOGRAM COMPLETE: CPT

## 2019-09-11 RX ORDER — ATORVASTATIN CALCIUM 20 MG/1
20 TABLET, FILM COATED ORAL DAILY
Qty: 45 | Refills: 1 | Status: DISCONTINUED | COMMUNITY
Start: 2018-08-10 | End: 2019-09-11

## 2019-09-15 LAB
ALBUMIN SERPL ELPH-MCNC: 4 G/DL
ALP BLD-CCNC: 84 U/L
ALT SERPL-CCNC: 18 U/L
ANION GAP SERPL CALC-SCNC: 12 MMOL/L
AST SERPL-CCNC: 20 U/L
BASOPHILS # BLD AUTO: 0.03 K/UL
BASOPHILS NFR BLD AUTO: 0.5 %
BILIRUB SERPL-MCNC: 0.5 MG/DL
BUN SERPL-MCNC: 22 MG/DL
CALCIUM SERPL-MCNC: 9.2 MG/DL
CHLORIDE SERPL-SCNC: 105 MMOL/L
CHOLEST SERPL-MCNC: 132 MG/DL
CHOLEST/HDLC SERPL: 2.9 RATIO
CO2 SERPL-SCNC: 25 MMOL/L
CREAT SERPL-MCNC: 1.04 MG/DL
EOSINOPHIL # BLD AUTO: 0.28 K/UL
EOSINOPHIL NFR BLD AUTO: 4.9 %
GLUCOSE SERPL-MCNC: 104 MG/DL
HCT VFR BLD CALC: 41.4 %
HDLC SERPL-MCNC: 46 MG/DL
HGB BLD-MCNC: 13.5 G/DL
IMM GRANULOCYTES NFR BLD AUTO: 0.2 %
LDLC SERPL CALC-MCNC: 65 MG/DL
LYMPHOCYTES # BLD AUTO: 1.58 K/UL
LYMPHOCYTES NFR BLD AUTO: 27.5 %
MAGNESIUM SERPL-MCNC: 2.1 MG/DL
MAN DIFF?: NORMAL
MCHC RBC-ENTMCNC: 32.6 GM/DL
MCHC RBC-ENTMCNC: 33.8 PG
MCV RBC AUTO: 103.8 FL
MONOCYTES # BLD AUTO: 0.69 K/UL
MONOCYTES NFR BLD AUTO: 12 %
NEUTROPHILS # BLD AUTO: 3.16 K/UL
NEUTROPHILS NFR BLD AUTO: 54.9 %
PLATELET # BLD AUTO: 178 K/UL
POTASSIUM SERPL-SCNC: 4.6 MMOL/L
PROT SERPL-MCNC: 6 G/DL
RBC # BLD: 3.99 M/UL
RBC # FLD: 13.4 %
SODIUM SERPL-SCNC: 142 MMOL/L
TRIGL SERPL-MCNC: 103 MG/DL
WBC # FLD AUTO: 5.75 K/UL

## 2019-09-15 NOTE — REASON FOR VISIT
[Follow-Up - Clinic] : a clinic follow-up of [Coronary Artery Disease] : coronary artery disease [Hyperlipidemia] : hyperlipidemia [Medication Management] : Medication management [Spouse] : spouse [FreeTextEntry1] : Mati is doing well on the current regimen he seems to have some improvement in his cognition on a low dose of simvastatin currently and donepezil which also seemed to improve his cognition. We'll continue current course discussed with wife at bedside. his heart failure is compensated on a low dose diuretic.

## 2019-09-15 NOTE — DISCUSSION/SUMMARY
[Coronary Artery Disease] : coronary artery disease [Diastolic Heart Failure] : diastolic congestive heart failure [Hyperlipidemia] : hyperlipidemia [Stable] : stable [Responding to Treatment] : responding to treatment

## 2019-09-15 NOTE — PHYSICAL EXAM
[General Appearance - Well Developed] : well developed [Well Groomed] : well groomed [Normal Appearance] : normal appearance [General Appearance - Well Nourished] : well nourished [No Deformities] : no deformities [General Appearance - In No Acute Distress] : no acute distress [Normal Conjunctiva] : the conjunctiva exhibited no abnormalities [Eyelids - No Xanthelasma] : the eyelids demonstrated no xanthelasmas [No Oral Pallor] : no oral pallor [Normal Oral Mucosa] : normal oral mucosa [Normal Jugular Venous A Waves Present] : normal jugular venous A waves present [No Oral Cyanosis] : no oral cyanosis [Normal Jugular Venous V Waves Present] : normal jugular venous V waves present [No Jugular Venous Telles A Waves] : no jugular venous telles A waves [Respiration, Rhythm And Depth] : normal respiratory rhythm and effort [Exaggerated Use Of Accessory Muscles For Inspiration] : no accessory muscle use [Auscultation Breath Sounds / Voice Sounds] : lungs were clear to auscultation bilaterally [Heart Rate And Rhythm] : heart rate and rhythm were normal [Heart Sounds] : normal S1 and S2 [Murmurs] : no murmurs present [Abdomen Soft] : soft [Abdomen Tenderness] : non-tender [Abdomen Mass (___ Cm)] : no abdominal mass palpated [Abnormal Walk] : normal gait [Nail Clubbing] : no clubbing of the fingernails [Cyanosis, Localized] : no localized cyanosis [Petechial Hemorrhages (___cm)] : no petechial hemorrhages [] : no rash [Skin Color & Pigmentation] : normal skin color and pigmentation [No Venous Stasis] : no venous stasis [Skin Lesions] : no skin lesions [No Skin Ulcers] : no skin ulcer [No Xanthoma] : no  xanthoma was observed [Affect] : the affect was normal [Oriented To Time, Place, And Person] : oriented to person, place, and time [Mood] : the mood was normal [No Anxiety] : not feeling anxious [FreeTextEntry1] : uses  a cane

## 2019-09-16 ENCOUNTER — MEDICATION RENEWAL (OUTPATIENT)
Age: 79
End: 2019-09-16

## 2019-09-16 RX ORDER — CLONAZEPAM 0.5 MG/1
0.5 TABLET ORAL
Qty: 5 | Refills: 0 | Status: COMPLETED | COMMUNITY
Start: 2018-10-19 | End: 2019-09-23

## 2019-09-22 ENCOUNTER — FORM ENCOUNTER (OUTPATIENT)
Age: 79
End: 2019-09-22

## 2019-09-23 ENCOUNTER — OUTPATIENT (OUTPATIENT)
Dept: OUTPATIENT SERVICES | Facility: HOSPITAL | Age: 79
LOS: 1 days | End: 2019-09-23
Payer: MEDICARE

## 2019-09-23 ENCOUNTER — APPOINTMENT (OUTPATIENT)
Dept: MRI IMAGING | Facility: HOSPITAL | Age: 79
End: 2019-09-23
Payer: MEDICARE

## 2019-09-23 DIAGNOSIS — G45.0 VERTEBRO-BASILAR ARTERY SYNDROME: ICD-10-CM

## 2019-09-23 DIAGNOSIS — Z98.89 OTHER SPECIFIED POSTPROCEDURAL STATES: Chronic | ICD-10-CM

## 2019-09-23 PROCEDURE — 70544 MR ANGIOGRAPHY HEAD W/O DYE: CPT | Mod: 26

## 2019-09-23 PROCEDURE — 70547 MR ANGIOGRAPHY NECK W/O DYE: CPT | Mod: 26

## 2019-09-23 PROCEDURE — 70547 MR ANGIOGRAPHY NECK W/O DYE: CPT

## 2019-09-23 PROCEDURE — 70544 MR ANGIOGRAPHY HEAD W/O DYE: CPT

## 2019-09-30 ENCOUNTER — APPOINTMENT (OUTPATIENT)
Dept: NEUROLOGY | Facility: CLINIC | Age: 79
End: 2019-09-30

## 2019-10-08 ENCOUNTER — APPOINTMENT (OUTPATIENT)
Dept: FAMILY MEDICINE | Facility: CLINIC | Age: 79
End: 2019-10-08
Payer: MEDICARE

## 2019-10-08 VITALS
HEART RATE: 61 BPM | DIASTOLIC BLOOD PRESSURE: 60 MMHG | BODY MASS INDEX: 23.54 KG/M2 | OXYGEN SATURATION: 98 % | WEIGHT: 150 LBS | SYSTOLIC BLOOD PRESSURE: 120 MMHG | HEIGHT: 67 IN | RESPIRATION RATE: 16 BRPM

## 2019-10-08 DIAGNOSIS — R93.89 ABNORMAL FINDINGS ON DIAGNOSTIC IMAGING OF OTHER SPECIFIED BODY STRUCTURES: ICD-10-CM

## 2019-10-08 DIAGNOSIS — Z86.79 PERSONAL HISTORY OF OTHER DISEASES OF THE CIRCULATORY SYSTEM: ICD-10-CM

## 2019-10-08 DIAGNOSIS — Z86.69 PERSONAL HISTORY OF OTHER DISEASES OF THE NERVOUS SYSTEM AND SENSE ORGANS: ICD-10-CM

## 2019-10-08 DIAGNOSIS — R93.0 ABNORMAL FINDINGS ON DIAGNOSTIC IMAGING OF SKULL AND HEAD, NOT ELSEWHERE CLASSIFIED: ICD-10-CM

## 2019-10-08 DIAGNOSIS — Z23 ENCOUNTER FOR IMMUNIZATION: ICD-10-CM

## 2019-10-08 LAB — HBA1C MFR BLD HPLC: 5.6

## 2019-10-08 PROCEDURE — 99214 OFFICE O/P EST MOD 30 MIN: CPT | Mod: 25

## 2019-10-08 PROCEDURE — 83036 HEMOGLOBIN GLYCOSYLATED A1C: CPT | Mod: QW

## 2019-10-08 PROCEDURE — 90686 IIV4 VACC NO PRSV 0.5 ML IM: CPT

## 2019-10-08 PROCEDURE — G0008: CPT

## 2019-10-08 NOTE — REVIEW OF SYSTEMS
[Hearing Loss] : hearing loss [Earache] : no earache [Nasal Discharge] : no nasal discharge [Joint Stiffness] : joint stiffness [Joint Pain] : joint pain [Muscle Pain] : muscle pain [Muscle Weakness] : muscle weakness [Back Pain] : back pain [Joint Swelling] : no joint swelling [Memory Loss] : memory loss [Unsteady Walk] : ataxia [Negative] : Psychiatric [FreeTextEntry4] : wears hearing aids [FreeTextEntry9] : dropped foot [de-identified] : wears braces both feet

## 2019-10-08 NOTE — COUNSELING
[Adequate lighting] : Adequate lighting [No throw rugs] : No throw rugs [Use proper foot wear] : Use proper foot wear [Use recommended devices] : Use recommended devices

## 2019-10-08 NOTE — DATA REVIEWED
[FreeTextEntry1] : went over A1c done today and also last labs September done cardiology\par continue present meds no changes

## 2019-10-08 NOTE — HISTORY OF PRESENT ILLNESS
[FreeTextEntry1] : f/u [de-identified] : 78 y/o DM2 HTN HLD CAROL, CKD CHF gait instability for f/u medical conditions states is going to chiropractor prn for CBP doing well otherwise wife here with him states low energy and sleeps a lot Pt doing well otherwise Pt a bit forgetful Pt no CP SOB palpitation or dizziness. CBP takes heating pads but no medications and is drinking  a lot water

## 2019-10-08 NOTE — PHYSICAL EXAM
[Normal S1, S2] : normal S1 and S2 [Normal] : soft, non-tender, non-distended, no masses palpated, no HSM and normal bowel sounds [No CVA Tenderness] : no CVA  tenderness [No Spinal Tenderness] : no spinal tenderness [de-identified] : IRR

## 2019-10-18 ENCOUNTER — APPOINTMENT (OUTPATIENT)
Dept: NEUROLOGY | Facility: CLINIC | Age: 79
End: 2019-10-18
Payer: MEDICARE

## 2019-10-18 VITALS
DIASTOLIC BLOOD PRESSURE: 71 MMHG | BODY MASS INDEX: 23.54 KG/M2 | SYSTOLIC BLOOD PRESSURE: 153 MMHG | WEIGHT: 150 LBS | HEART RATE: 61 BPM | HEIGHT: 67 IN

## 2019-10-18 DIAGNOSIS — R41.3 OTHER AMNESIA: ICD-10-CM

## 2019-10-18 PROCEDURE — 99215 OFFICE O/P EST HI 40 MIN: CPT

## 2019-10-18 NOTE — HISTORY OF PRESENT ILLNESS
[FreeTextEntry1] : Mr. Ewing is a 80 Y/O R handed man with vascular risk factors of HTN, diet controlled DM II, HPLD, ? CAD  and age is seen in the vascular neurology office for the evaluation and management of short term memory loss. \par \par He has no new neurological symptoms. He has had a Neuropsych assessment.  He continues to be on ASA and Lipitor for stroke management.\par \par From previous evaluation -\par He is reported to progressive memory loss - predominantly affecting short term memory not interfering with his abilities to perform ADLs. He underwent MRI brain for further evaluation. He was referred to vascular neurology office for evaluation of "abnormal MRI brain". He denies any episodes of focal neurological symptoms like focal motor weakness, focal sensory changes like tingling or numbness, vision or language disturbance, dysarthria, dysphagia, headache, nausea, vomiting, imbalance, dizziness or vertiginous sensations in the past. Reports to be taking aspirin and statin from cardiac stand point. Denies any family history of stroke at young age. Reports to have harmeet provoked DVT in 2012, attributed to immobilization after back surgery and he was on therapeutic anticoagulation with warfarin for 6 months. Denies any family history of DVT/PEs. \par \par ROS: All negative except documented in the history of present illness.\par \par Home medications:\par Reviewed with the patient/available family member and updated as appropriate.

## 2019-10-18 NOTE — ASSESSMENT
[FreeTextEntry1] : Mr. Ewing is a 79 Y/O R handed man with vascular risk factors of HTN, diet controlled DM II, HPLD, ? CAD  and age is seen in the vascular neurology office for the evaluation and management of short term memory loss. He is reported to progressive memory loss - predominantly affecting short term memory not interfering with his abilities to perform ADLs noticed since last few years. MRI brain (12/18) is reported to show "no acute intracranial hemorrhage or infarct, loss of normal flow void in the intradural segment of distal right vertebral artery which may be related to occlusion/high-grade stenosis".. 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 in this patient with left vertebral artery dominant vertebrobasilar system - the exact etiology of which remains unclear but could be related to atherosclerotic severe stenosis leading to sluggish flow versus complete occlusion of intracranial/intradural right vertebral artery - asymptomatic\par \par   Plan: - \par From Stroke prevention standpoint, he's currently on aspirin 81 mg once a day  Atorvastatin 20 mg and bedtime as per home medications/dose; further dose titration is deferred to PCP/cardiologist based on medical indications/ASCVD risk profile - He should follow up closely with her primary care physician for optimal vascular risk factors modifications including blood pressure goal less than 130/80 mmHg, HbA1c goal <7 and preferably 6-6.5 and optimal cholesterol management  - \par \par I reviewed the Neuropsychology report and MRA head - 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

## 2019-10-18 NOTE — REASON FOR VISIT
[Consultation] : a consultation visit [Follow-Up: _____] : a [unfilled] follow-up visit [FreeTextEntry1] : for abnormal MRI brain

## 2019-11-03 ENCOUNTER — RX RENEWAL (OUTPATIENT)
Age: 79
End: 2019-11-03

## 2019-11-18 ENCOUNTER — APPOINTMENT (OUTPATIENT)
Dept: NEUROLOGY | Facility: CLINIC | Age: 79
End: 2019-11-18
Payer: MEDICARE

## 2019-11-18 VITALS — DIASTOLIC BLOOD PRESSURE: 60 MMHG | HEART RATE: 67 BPM | SYSTOLIC BLOOD PRESSURE: 147 MMHG

## 2019-11-18 PROCEDURE — 93886 INTRACRANIAL COMPLETE STUDY: CPT

## 2019-11-18 PROCEDURE — 99214 OFFICE O/P EST MOD 30 MIN: CPT

## 2019-11-18 PROCEDURE — 93880 EXTRACRANIAL BILAT STUDY: CPT

## 2019-11-18 PROCEDURE — 93892 TCD EMBOLI DETECT W/O INJ: CPT

## 2019-11-18 NOTE — DATA REVIEWED
[de-identified] : EXAM: MR BRAIN \par \par \par PROCEDURE DATE: 12/05/2018 \par \par \par \par INTERPRETATION: CLINICAL STATEMENT: Increased memory loss \par \par TECHNIQUE: MRI of the brain was performed without gadolinium. \par \par COMPARISON: None. \par \par FINDINGS: \par There is moderate diffuse parenchymal volume loss. There are T2 prolongation \par signal abnormalities in the periventricular subcortical white matter likely \par related to mild chronic microvascular ischemic changes. \par \par There is no acute parenchymal hemorrhage, parenchymal mass, mass effect or \par midline shift. There is no extra-axial fluid collection. There is no \par hydrocephalus. There is no acute infarct. Partial empty sella \par \par Loss of normal flow void in the intradural segment of distal right vertebral \par artery noted. \par \par Mucosal thickening paranasal sinuses \par \par IMPRESSION: \par No acute intracranial hemorrhage or acute infarct. \par \par Loss of normal flow void in the intradural segment of distal right vertebral \par artery which may be related to occlusion/high-grade stenosis. \par \par \par \par \par \par \par \par \par \par ERYN GUPTA M.D., ATTENDING RADIOLOGIST \par This document has been electronically signed. Dec 5 2018 2:33PM \par  [de-identified] : See chart. [de-identified] : Labs done, all wnl.

## 2019-11-18 NOTE — PHYSICAL EXAM
[General Appearance - In No Acute Distress] : in no acute distress [General Appearance - Alert] : alert [Oriented To Time, Place, And Person] : oriented to person, place, and time [Affect] : the affect was normal [Impaired Insight] : insight and judgment were intact [Person] : oriented to person [Place] : oriented to place [Time] : oriented to time [Concentration Intact] : normal concentrating ability [Visual Intact] : visual attention was ~T not ~L decreased [Naming Objects] : no difficulty naming common objects [Repeating Phrases] : no difficulty repeating a phrase [Writing A Sentence] : no difficulty writing a sentence [Fluency] : fluency intact [Comprehension] : comprehension intact [Reading] : reading intact [Past History] : adequate knowledge of personal past history [Total Score ___ / 30] : the patient achieved a score of [unfilled] /30 [Date / Time ___ / 5] : date / time [unfilled] / 5 [Registration ___ / 3] : registration [unfilled] / 3 [Place ___ / 5] : place [unfilled] / 5 [Serial Sevens ___/5] : serial sevens [unfilled] / 5 [Repeating a Sentence ___ / 1] : repeating a sentence [unfilled] / 1 [Naming 2 Objects ___ / 2] : naming two objects [unfilled] / 2 [Writing a Sentence ___ / 1] : write sentence [unfilled] / 1 [3-stage Verbal Command ___ / 3] : three-stage verbal command [unfilled] / 3 [Written Command ___ / 1] : written command [unfilled] / 1 [Copy a Design ___ / 1] : copy a design [unfilled] / 1 [Recall ___ / 3] : recall [unfilled] / 3 [Cranial Nerves Optic (II)] : visual acuity intact bilaterally,  visual fields full to confrontation, pupils equal round and reactive to light [Cranial Nerves Trigeminal (V)] : facial sensation intact symmetrically [Cranial Nerves Oculomotor (III)] : extraocular motion intact [Cranial Nerves Facial (VII)] : face symmetrical [Cranial Nerves Glossopharyngeal (IX)] : tongue and palate midline [Cranial Nerves Vestibulocochlear (VIII)] : hearing was intact bilaterally [Cranial Nerves Accessory (XI - Cranial And Spinal)] : head turning and shoulder shrug symmetric [Motor Strength] : muscle strength was normal in all four extremities [Cranial Nerves Hypoglossal (XII)] : there was no tongue deviation with protrusion [Involuntary Movements] : no involuntary movements were seen [No Muscle Atrophy] : normal bulk in all four extremities [Motor Handedness Right-Handed] : the patient is right hand dominant [Sensation Pain / Temperature Decrease] : pain and temperature was intact [Sensation Tactile Decrease] : light touch was intact [Romberg's Sign] : a positive Romberg's sign was present [Proprioception] : proprioception was intact [Balance] : balance was intact [Limited Balance] : the patient's balance was impaired [1+] : Brachioradialis left 1+ [0] : Ankle jerk right 0 [Sclera] : the sclera and conjunctiva were normal [PERRL With Normal Accommodation] : pupils were equal in size, round, reactive to light, with normal accommodation [Extraocular Movements] : extraocular movements were intact [Optic Disc Abnormality] : the optic disc were normal in size and color [No APD] : no afferent pupillary defect [No HELGA] : no internuclear ophthalmoplegia [Full Visual Field] : full visual field [Outer Ear] : the ears and nose were normal in appearance [Oropharynx] : the oropharynx was normal [Neck Appearance] : the appearance of the neck was normal [Thyroid Diffuse Enlargement] : the thyroid was not enlarged [Jugular Venous Distention Increased] : there was no jugular-venous distention [Neck Cervical Mass (___cm)] : no neck mass was observed [Auscultation Breath Sounds / Voice Sounds] : lungs were clear to auscultation bilaterally [Thyroid Nodule] : there were no palpable thyroid nodules [Heart Rate And Rhythm] : heart rate was normal and rhythm regular [Murmurs] : no murmurs [Heart Sounds] : normal S1 and S2 [Heart Sounds Gallop] : no gallops [Heart Sounds Pericardial Friction Rub] : no pericardial rub [Arterial Pulses Carotid] : carotid pulses were normal with no bruits [Edema] : there was no peripheral edema [Bowel Sounds] : normal bowel sounds [Full Pulse] : the pedal pulses are present [Abdomen Soft] : soft [Abdomen Tenderness] : non-tender [No CVA Tenderness] : no ~M costovertebral angle tenderness [Abdomen Mass (___ Cm)] : no abdominal mass palpated [Nail Clubbing] : no clubbing  or cyanosis of the fingernails [No Spinal Tenderness] : no spinal tenderness [Musculoskeletal - Swelling] : no joint swelling seen [Skin Color & Pigmentation] : normal skin color and pigmentation [Motor Tone] : muscle strength and tone were normal [] : no rash [Skin Turgor] : normal skin turgor [Motor Strength Upper Extremities Bilaterally] : strength was normal in both upper extremities [Motor Strength Lower Extremities Bilaterally] : strength was normal in both lower extremities [Allodynia] : no ~T allodynia present [Dysesthesia] : no dysesthesia [Hyperesthesia] : no hyperesthesia [Past-pointing] : there was no past-pointing [Tremor] : no tremor present [Plantar Reflex Right Only] : normal on the right [Plantar Reflex Left Only] : normal on the left [FreeTextEntry4] : Mental Status Exam\par Presidents: 0/5\par Alternating Pattern: ok\par Spiral: ok\par Clock: 3/3\par Repetition: ok\par R/L discrimination on self and examiner: ok\par Cross-line commands: ok\par Praxis: \par -Motor: ok\par -Dynamic/Luria: ok\par -Ideomotor/Imitation: ok \par -Ideational/writing/closing-in: ok\par -Dressing: ok. [FreeTextEntry6] : Foot drop bilat, uses braces [FreeTextEntry7] : loss of vib sensation in UE and LE distally [FreeTextEntry8] : walks with cane, stepping gait, cautioned [FreeTextEntry1] : bilat cataracts

## 2019-11-18 NOTE — ASSESSMENT
[FreeTextEntry1] : Assessment:\par 78yo RH WM, with vascular risk factors, PMH of laminectomy and residual foot drops, ataxic gait (pre DM), here with concerns of memory problems.STM issues, mild. 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 \par Pt has been stable, maybe better on Aricept. 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\par -ataxic gait/sensory neuropathy/radiculopathy.\par \par Plan:\par -Aricept 10mg\par -would consider for trials, when available, pt will talk with children and will call me back-for now would forego, not interested\par -encourage activity.\par \par A thorough discussion was entertained with the patient/caregiver regarding the use of psychoactive medications, their possible benefits and AE profile, including the risk of cardiovascular complications.\par We discussed the benefits of being active, physically and mentally, and the need to to establish a routine in this respect.\par Driving abilities and firearms possession and use were discussed, in relation to progression of the cognitive decline, and the need to assess them periodically.\par Patient/caregiver understand and agree with the plan.\par

## 2019-11-18 NOTE — HISTORY OF PRESENT ILLNESS
[FreeTextEntry1] : HPI-interval hx [id number]:\par pt here with wife and daughter.\par He has been stable.\par NPT reviewed with Dr. Ewing, aMCI seems the case.\par He has been stable, maybe better, more attentive, makes extra efforts. \par More attentive to his hydration as well.\par ADL and IADL stable.\par Driving ok.\par Got CT heart and CoronaryAngioRx, so far ok. Still with some tachycardia. \par \par HPI: 77yo RH WM, with HTN, HLD, diet controlled DM II, ? CAD, here for report of forgetfulness. Radiculopathy in LE, with drop foot bilat, due to L4-L5 herniation (laminectomy in 2012).\par \par PMH: Pt seen by Dr. Levine recently.\par Per wife, he has shown, over the last 6-12 months, STM issues, forgetting recent events, upcoming appointments. No problems with names, locations, faces.\par No changes in behavior.\par Imaging and NPT done recently. 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 \par \par Motor symptoms: limited by LE radiculopathy, uses braces\par \par B/B: no issues, some polyuria. \par \par Psychiatric symptoms: no issues. \par \par ADL: intact\par IADL: full, drives, no issues\par CDR: 0.5.\par \par Professional status: retired, former .\par \par PCP and other physicians:\par -PCP: Carlito\par -Neuro: Abner.

## 2019-12-01 ENCOUNTER — RX RENEWAL (OUTPATIENT)
Age: 79
End: 2019-12-01

## 2019-12-18 ENCOUNTER — APPOINTMENT (OUTPATIENT)
Dept: CARDIOLOGY | Facility: CLINIC | Age: 79
End: 2019-12-18
Payer: MEDICARE

## 2019-12-18 ENCOUNTER — NON-APPOINTMENT (OUTPATIENT)
Age: 79
End: 2019-12-18

## 2019-12-18 VITALS
DIASTOLIC BLOOD PRESSURE: 62 MMHG | WEIGHT: 157 LBS | HEIGHT: 67 IN | SYSTOLIC BLOOD PRESSURE: 127 MMHG | HEART RATE: 65 BPM | OXYGEN SATURATION: 99 % | RESPIRATION RATE: 16 BRPM | BODY MASS INDEX: 24.64 KG/M2

## 2019-12-18 PROCEDURE — 93000 ELECTROCARDIOGRAM COMPLETE: CPT

## 2019-12-18 PROCEDURE — 99213 OFFICE O/P EST LOW 20 MIN: CPT

## 2019-12-18 RX ORDER — FUROSEMIDE 20 MG/1
20 TABLET ORAL
Qty: 90 | Refills: 1 | Status: DISCONTINUED | COMMUNITY
Start: 2019-04-19 | End: 2019-12-18

## 2019-12-23 NOTE — DISCUSSION/SUMMARY
[Coronary Artery Disease] : coronary artery disease [Hyperlipidemia] : hyperlipidemia [Stable] : stable [Responding to Treatment] : responding to treatment

## 2019-12-23 NOTE — REASON FOR VISIT
[Follow-Up - Clinic] : a clinic follow-up of [Coronary Artery Disease] : coronary artery disease [Hyperlipidemia] : hyperlipidemia [FreeTextEntry1] : kimberlyn is doing well. may be somewhat improved on lower dose of statin

## 2020-01-22 ENCOUNTER — RX RENEWAL (OUTPATIENT)
Age: 80
End: 2020-01-22

## 2020-01-23 ENCOUNTER — APPOINTMENT (OUTPATIENT)
Dept: FAMILY MEDICINE | Facility: CLINIC | Age: 80
End: 2020-01-23
Payer: MEDICARE

## 2020-01-23 VITALS
DIASTOLIC BLOOD PRESSURE: 72 MMHG | RESPIRATION RATE: 16 BRPM | TEMPERATURE: 97.9 F | HEART RATE: 68 BPM | BODY MASS INDEX: 24.43 KG/M2 | WEIGHT: 156 LBS | SYSTOLIC BLOOD PRESSURE: 113 MMHG | OXYGEN SATURATION: 100 %

## 2020-01-23 DIAGNOSIS — J06.9 ACUTE UPPER RESPIRATORY INFECTION, UNSPECIFIED: ICD-10-CM

## 2020-01-23 DIAGNOSIS — B97.89 ACUTE UPPER RESPIRATORY INFECTION, UNSPECIFIED: ICD-10-CM

## 2020-01-23 PROCEDURE — 99213 OFFICE O/P EST LOW 20 MIN: CPT

## 2020-01-25 NOTE — HISTORY OF PRESENT ILLNESS
[FreeTextEntry8] : 78 y/o here with wife for cough congestion here with wife afraid he would get worse over the weekend . Pt no fevers  no cough  little mucus nasal discharge states feels fine and denies CP SOB palpations dizziness or lightheadedness

## 2020-01-25 NOTE — PHYSICAL EXAM
[PERRL] : pupils equal round and reactive to light [No Acute Distress] : no acute distress [Well-Appearing] : well-appearing [Normal Outer Ear/Nose] : the outer ears and nose were normal in appearance [EOMI] : extraocular movements intact [Normal Oropharynx] : the oropharynx was normal [Normal TMs] : both tympanic membranes were normal [Normal S1, S2] : normal S1 and S2 [Regular Rhythm] : with a regular rhythm [Normal] : no rash [No Joint Swelling] : no joint swelling [de-identified] : no leg edema bilateral drop foot with brace support in both legs on [de-identified] : hearing aids no wax in canal

## 2020-02-06 ENCOUNTER — APPOINTMENT (OUTPATIENT)
Dept: NEUROLOGY | Facility: CLINIC | Age: 80
End: 2020-02-06
Payer: MEDICARE

## 2020-02-06 VITALS
SYSTOLIC BLOOD PRESSURE: 114 MMHG | OXYGEN SATURATION: 99 % | RESPIRATION RATE: 17 BRPM | WEIGHT: 156 LBS | TEMPERATURE: 97.4 F | DIASTOLIC BLOOD PRESSURE: 65 MMHG | HEIGHT: 67 IN | BODY MASS INDEX: 24.48 KG/M2 | HEART RATE: 65 BPM

## 2020-02-06 PROCEDURE — 99214 OFFICE O/P EST MOD 30 MIN: CPT

## 2020-03-07 ENCOUNTER — RX RENEWAL (OUTPATIENT)
Age: 80
End: 2020-03-07

## 2020-03-07 ENCOUNTER — LABORATORY RESULT (OUTPATIENT)
Age: 80
End: 2020-03-07

## 2020-03-08 LAB
ALBUMIN SERPL ELPH-MCNC: 4.1 G/DL
ALP BLD-CCNC: 88 U/L
ALT SERPL-CCNC: 19 U/L
ANION GAP SERPL CALC-SCNC: 12 MMOL/L
AST SERPL-CCNC: 27 U/L
BASOPHILS # BLD AUTO: 0.04 K/UL
BASOPHILS NFR BLD AUTO: 0.8 %
BILIRUB SERPL-MCNC: 0.6 MG/DL
BUN SERPL-MCNC: 28 MG/DL
CALCIUM SERPL-MCNC: 9.3 MG/DL
CHLORIDE SERPL-SCNC: 105 MMOL/L
CHOLEST SERPL-MCNC: 139 MG/DL
CHOLEST/HDLC SERPL: 2.8 RATIO
CO2 SERPL-SCNC: 23 MMOL/L
CREAT SERPL-MCNC: 1.07 MG/DL
CREAT SPEC-SCNC: 105 MG/DL
EOSINOPHIL # BLD AUTO: 0.18 K/UL
EOSINOPHIL NFR BLD AUTO: 3.4 %
ESTIMATED AVERAGE GLUCOSE: 128 MG/DL
FOLATE SERPL-MCNC: >20 NG/ML
GLUCOSE SERPL-MCNC: 97 MG/DL
HBA1C MFR BLD HPLC: 6.1 %
HCT VFR BLD CALC: 40.5 %
HDLC SERPL-MCNC: 50 MG/DL
HGB BLD-MCNC: 13.4 G/DL
IMM GRANULOCYTES NFR BLD AUTO: 0.2 %
LDLC SERPL CALC-MCNC: 73 MG/DL
LYMPHOCYTES # BLD AUTO: 1.61 K/UL
LYMPHOCYTES NFR BLD AUTO: 30.2 %
MAN DIFF?: NORMAL
MCHC RBC-ENTMCNC: 33.1 GM/DL
MCHC RBC-ENTMCNC: 34.8 PG
MCV RBC AUTO: 105.2 FL
MICROALBUMIN 24H UR DL<=1MG/L-MCNC: <1.2 MG/DL
MICROALBUMIN/CREAT 24H UR-RTO: NORMAL MG/G
MONOCYTES # BLD AUTO: 0.58 K/UL
MONOCYTES NFR BLD AUTO: 10.9 %
NEUTROPHILS # BLD AUTO: 2.91 K/UL
NEUTROPHILS NFR BLD AUTO: 54.5 %
PLATELET # BLD AUTO: 179 K/UL
POTASSIUM SERPL-SCNC: 4.5 MMOL/L
PROT SERPL-MCNC: 6.1 G/DL
RBC # BLD: 3.85 M/UL
RBC # FLD: 14.2 %
SODIUM SERPL-SCNC: 140 MMOL/L
TRIGL SERPL-MCNC: 84 MG/DL
VIT B12 SERPL-MCNC: 642 PG/ML
WBC # FLD AUTO: 5.33 K/UL

## 2020-04-15 ENCOUNTER — APPOINTMENT (OUTPATIENT)
Dept: CARDIOLOGY | Facility: CLINIC | Age: 80
End: 2020-04-15

## 2020-05-05 ENCOUNTER — RX RENEWAL (OUTPATIENT)
Age: 80
End: 2020-05-05

## 2020-05-12 ENCOUNTER — APPOINTMENT (OUTPATIENT)
Dept: FAMILY MEDICINE | Facility: CLINIC | Age: 80
End: 2020-05-12
Payer: MEDICARE

## 2020-05-12 PROCEDURE — 99214 OFFICE O/P EST MOD 30 MIN: CPT | Mod: 95

## 2020-05-12 NOTE — DATA REVIEWED
[FreeTextEntry1] : went over recent labs MCV still elevated no alcohol per pt otherwise doing well\par

## 2020-05-12 NOTE — PHYSICAL EXAM
[Normal] : no acute distress, well nourished, well developed and well-appearing [Speech Grossly Normal] : speech grossly normal [Normal Mood] : the mood was normal [Alert and Oriented x3] : oriented to person, place, and time [Normal Affect] : the affect was normal [Normal Insight/Judgement] : insight and judgment were intact [de-identified] : 0x3 in NAD

## 2020-05-12 NOTE — HISTORY OF PRESENT ILLNESS
[Home] : at home, [unfilled] , at the time of the visit. [Spouse] : spouse [Medical Office: ([facility])___] : at the medical office located in  [Other:____] : [unfilled] [Patient] : the patient [Self] : self [de-identified] : 78 y/o PMHC HTN HLD DM2 well controlled CAROL doing well except noted changes when swallow at times when eats breads or cake gets stuck on throat and cough no SOB no CP dizziness palpations no weight changes changes in stool changes in voice, pt also notes when takes Aricept on get some sort GI upset but not all the time. Pt states no other issue with other foods getting stuck on throat or other issues. Pt no bowel or bladder issues does go out walks in the day and feels legs are stable denies leg pain falls or weakness

## 2020-05-12 NOTE — REVIEW OF SYSTEMS
[Negative] : Neurological [Abdominal Pain] : no abdominal pain [Nausea] : no nausea [Vomiting] : no vomiting [Constipation] : no constipation [Diarrhea] : no diarrhea [Heartburn] : no heartburn [Melena] : no melena [FreeTextEntry7] : dysphagia to eating breads or cake

## 2020-05-18 ENCOUNTER — APPOINTMENT (OUTPATIENT)
Dept: NEUROLOGY | Facility: CLINIC | Age: 80
End: 2020-05-18
Payer: MEDICARE

## 2020-05-18 PROCEDURE — 99214 OFFICE O/P EST MOD 30 MIN: CPT | Mod: 95

## 2020-05-18 NOTE — HISTORY OF PRESENT ILLNESS
[Home] : at home, [unfilled] , at the time of the visit. [Other Location: e.g. Home (Enter Location, City,State)___] : at [unfilled] [Spouse] : spouse [Patient] : the patient [FreeTextEntry1] : HPI-interval hx [id number] RPA: AWT used.\par Has tried to change Aricept to the am, but got diarrhea, so taking it at night. \par Sleeps well.\par Appetite intact, no issues.\par Gait ok.\par \par \par HPI-interval hx [id number]:\par pt here with wife and daughter.\par He has been stable.\par NPT reviewed with Dr. Ewing, aMCI seems the case.\par He has been stable, maybe better, more attentive, makes extra efforts. \par More attentive to his hydration as well.\par ADL and IADL stable.\par Driving ok.\par Got CT heart and CoronaryAngioRx, so far ok. Still with some tachycardia. \par \par HPI: 79yo RH WM, with HTN, HLD, diet controlled DM II, ? CAD, here for report of forgetfulness. Radiculopathy in LE, with drop foot bilat, due to L4-L5 herniation (laminectomy in 2012).\par \par PMH: Pt seen by Dr. Levine recently.\par Per wife, he has shown, over the last 6-12 months, STM issues, forgetting recent events, upcoming appointments. No problems with names, locations, faces.\par No changes in behavior.\par Imaging and NPT done recently. 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 \par \par Motor symptoms: limited by LE radiculopathy, uses braces\par \par B/B: no issues, some polyuria. \par \par Psychiatric symptoms: no issues. \par \par ADL: intact\par IADL: full, drives, no issues\par CDR: 0.5.\par \par Professional status: retired, former .\par \par PCP and other physicians:\par -PCP: Carlito\par -Neuro: Abner.

## 2020-05-18 NOTE — PHYSICAL EXAM
[General Appearance - Alert] : alert [General Appearance - In No Acute Distress] : in no acute distress [Impaired Insight] : insight and judgment were intact [Affect] : the affect was normal [Place] : oriented to place [Time] : oriented to time [Person] : oriented to person [Concentration Intact] : normal concentrating ability [Visual Intact] : visual attention was ~T not ~L decreased [Repeating Phrases] : no difficulty repeating a phrase [Naming Objects] : no difficulty naming common objects [Fluency] : fluency intact [Writing A Sentence] : no difficulty writing a sentence [Reading] : reading intact [Comprehension] : comprehension intact [Past History] : adequate knowledge of personal past history [Total Score ___ / 30] : the patient achieved a score of [unfilled] /30 [Date / Time ___ / 5] : date / time [unfilled] / 5 [Place ___ / 5] : place [unfilled] / 5 [Serial Sevens ___/5] : serial sevens [unfilled] / 5 [Registration ___ / 3] : registration [unfilled] / 3 [Naming 2 Objects ___ / 2] : naming two objects [unfilled] / 2 [Repeating a Sentence ___ / 1] : repeating a sentence [unfilled] / 1 [Writing a Sentence ___ / 1] : write sentence [unfilled] / 1 [Written Command ___ / 1] : written command [unfilled] / 1 [3-stage Verbal Command ___ / 3] : three-stage verbal command [unfilled] / 3 [Copy a Design ___ / 1] : copy a design [unfilled] / 1 [Recall ___ / 3] : recall [unfilled] / 3 [Cranial Nerves Optic (II)] : visual acuity intact bilaterally,  visual fields full to confrontation, pupils equal round and reactive to light [Cranial Nerves Oculomotor (III)] : extraocular motion intact [Cranial Nerves Trigeminal (V)] : facial sensation intact symmetrically [Cranial Nerves Facial (VII)] : face symmetrical [Cranial Nerves Vestibulocochlear (VIII)] : hearing was intact bilaterally [Cranial Nerves Accessory (XI - Cranial And Spinal)] : head turning and shoulder shrug symmetric [Cranial Nerves Hypoglossal (XII)] : there was no tongue deviation with protrusion [Motor Strength] : muscle strength was normal in all four extremities [Motor Handedness Right-Handed] : the patient is right hand dominant [Sensation Tactile Decrease] : light touch was intact [Romberg's Sign] : a positive Romberg's sign was present [Balance] : balance was intact [Limited Balance] : the patient's balance was impaired [Sclera] : the sclera and conjunctiva were normal [Extraocular Movements] : extraocular movements were intact [Full Visual Field] : full visual field [Outer Ear] : the ears and nose were normal in appearance [Neck Appearance] : the appearance of the neck was normal [Heart Rate And Rhythm] : heart rate was normal and rhythm regular [Heart Sounds Gallop] : no gallops [Heart Sounds Pericardial Friction Rub] : no pericardial rub [Murmurs] : no murmurs [Edema] : there was no peripheral edema [Full Pulse] : the pedal pulses are present [Involuntary Movements] : no involuntary movements were seen [Skin Color & Pigmentation] : normal skin color and pigmentation [] : no rash [Motor Strength Upper Extremities Bilaterally] : strength was normal in both upper extremities [Motor Strength Lower Extremities Bilaterally] : strength was normal in both lower extremities [Allodynia] : no ~T allodynia present [Dysesthesia] : no dysesthesia [Hyperesthesia] : no hyperesthesia [Past-pointing] : there was no past-pointing [Tremor] : no tremor present [Plantar Reflex Right Only] : normal on the right [Plantar Reflex Left Only] : normal on the left [FreeTextEntry4] : Mental Status Exam\par Presidents: 0/5\par Alternating Pattern: ok\par Spiral: ok\par Clock: 3/3-spacing is uneven, but overall in right quadrant\par Repetition: ok\par R/L discrimination on self and examiner: ok\par Cross-line commands: ok\par Praxis: \par -Motor: ok\par -Dynamic/Luria: ok\par -Ideomotor/Imitation: ok \par -Ideational/writing/closing-in: ok\par -Dressing: ok. [FreeTextEntry6] : Foot drop bilat, uses braces [FreeTextEntry8] : walks with cane, stepping gait, cautioned [FreeTextEntry1] : bilat cataracts

## 2020-05-18 NOTE — DATA REVIEWED
[de-identified] : EXAM: MR BRAIN \par \par \par PROCEDURE DATE: 12/05/2018 \par \par \par \par INTERPRETATION: CLINICAL STATEMENT: Increased memory loss \par \par TECHNIQUE: MRI of the brain was performed without gadolinium. \par \par COMPARISON: None. \par \par FINDINGS: \par There is moderate diffuse parenchymal volume loss. There are T2 prolongation \par signal abnormalities in the periventricular subcortical white matter likely \par related to mild chronic microvascular ischemic changes. \par \par There is no acute parenchymal hemorrhage, parenchymal mass, mass effect or \par midline shift. There is no extra-axial fluid collection. There is no \par hydrocephalus. There is no acute infarct. Partial empty sella \par \par Loss of normal flow void in the intradural segment of distal right vertebral \par artery noted. \par \par Mucosal thickening paranasal sinuses \par \par IMPRESSION: \par No acute intracranial hemorrhage or acute infarct. \par \par Loss of normal flow void in the intradural segment of distal right vertebral \par artery which may be related to occlusion/high-grade stenosis. \par \par \par \par \par \par \par \par \par \par ERYN GUPTA M.D., ATTENDING RADIOLOGIST \par This document has been electronically signed. Dec 5 2018 2:33PM \par  [de-identified] : See chart. [de-identified] : Labs done, all wnl.

## 2020-05-18 NOTE — ASSESSMENT
[FreeTextEntry1] : Assessment:\par 80yo RH WM, with vascular risk factors, PMH of laminectomy and residual foot drops, ataxic gait (pre DM), here with concerns of memory problems.STM issues, mild. 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\par -ataxic gait/sensory neuropathy/radiculopathy.\par \par Plan:\par -Aricept 10mg-continue\par -would consider for trials, but for now they prefer to hold off\par -encourage activity.\par \par A thorough discussion was entertained with the patient/caregiver regarding the use of psychoactive medications, their possible benefits and AE profile, including the risk of cardiovascular complications.\par We discussed the benefits of being active, physically and mentally, and the need to to establish a routine in this respect.\par Driving abilities and firearms possession and use were discussed, in relation to progression of the cognitive decline, and the need to assess them periodically.\par Patient/caregiver understand and agree with the plan.\par

## 2020-07-30 ENCOUNTER — APPOINTMENT (OUTPATIENT)
Dept: NEUROLOGY | Facility: CLINIC | Age: 80
End: 2020-07-30
Payer: MEDICARE

## 2020-07-30 VITALS
HEIGHT: 67 IN | SYSTOLIC BLOOD PRESSURE: 139 MMHG | DIASTOLIC BLOOD PRESSURE: 64 MMHG | HEART RATE: 65 BPM | BODY MASS INDEX: 24.33 KG/M2 | WEIGHT: 155 LBS

## 2020-07-30 VITALS — TEMPERATURE: 97.6 F

## 2020-07-30 PROCEDURE — 99215 OFFICE O/P EST HI 40 MIN: CPT

## 2020-07-30 NOTE — HISTORY OF PRESENT ILLNESS
[FreeTextEntry1] : Mr. Ewing is a 80 Y/O R handed man with vascular risk factors of HTN, diet controlled DM II, HPLD, ? CAD  and age is seen in the vascular neurology office for the evaluation and management of short term memory loss. \par \par No new neurological symptoms. still has bilateral foot drop, uses braces below knee.\par \par From previous evaluation -\par He is reported to progressive memory loss - predominantly affecting short term memory not interfering with his abilities to perform ADLs. He underwent MRI brain for further evaluation. He was referred to vascular neurology office for evaluation of "abnormal MRI brain". He denies any episodes of focal neurological symptoms like focal motor weakness, focal sensory changes like tingling or numbness, vision or language disturbance, dysarthria, dysphagia, headache, nausea, vomiting, imbalance, dizziness or vertiginous sensations in the past. Reports to be taking aspirin and statin from cardiac stand point. Denies any family history of stroke at young age. Reports to have harmeet provoked DVT in 2012, attributed to immobilization after back surgery and he was on therapeutic anticoagulation with warfarin for 6 months. Denies any family history of DVT/PEs. \par \par ROS: All negative except documented in the history of present illness.\par \par Home medications:\par Reviewed with the patient/available family member and updated as appropriate.\par \par He has no new neurological symptoms. He continues to follow up with Dr. Richmond and reports that his memory deficits have remained the same.  He continues to be on ASA and Lipitor for stroke management.

## 2020-07-30 NOTE — REVIEW OF SYSTEMS
[As Noted in HPI] : as noted in HPI [Loss Of Hearing] : hearing loss [Negative] : Endocrine [FreeTextEntry4] : Bilateral hearing loss - s/p hearing aides

## 2020-07-30 NOTE — DISCUSSION/SUMMARY
[Antithrombotic therapy with ___] : antithrombotic therapy with  [unfilled] [Intensive Blood Pressure Control] : intensive blood pressure control [Lipid Lowering Therapy] : lipid lowering therapy [Goals and Counseling] : I have reviewed the goals of stroke risk factor modification. I counseled the patient on measures to reduce stroke risk, including the importance of medication compliance, risk factor control, exercise, healthy diet and avoidance of smoking. I reviewed stroke warning signs and symptoms and appropriate actions to take if such occur. [Patient encouraged to discuss with Primary MD] : I encouraged the patient to discuss these important issues with ~his/her~ primary care doctor [FreeTextEntry1] : Mr. Ewing is a 79 Y/O R handed man with vascular risk factors of HTN, diet controlled DM II, HPLD, ? CAD  and age is seen in the vascular neurology office for the evaluation and management of short term memory loss. He is reported to progressive memory loss - predominantly affecting short term memory not interfering with his abilities to perform ADLs noticed since last few years. MRI brain (12/18) is reported to show "no acute intracranial hemorrhage or infarct, loss of normal flow void in the intradural segment of distal right vertebral artery which may be related to occlusion/high-grade stenosis".. 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 in this patient with left vertebral artery dominant vertebrobasilar system - the exact etiology of which remains unclear but could be related to atherosclerotic severe stenosis leading to sluggish flow versus complete occlusion of intracranial/intradural right vertebral artery - asymptomatic\par \par   Plan: - \par From Stroke prevention standpoint, he's currently on aspirin 81 mg once a day  Atorvastatin 20 mg and bedtime as per home medications/dose; further dose titration is deferred to PCP/cardiologist based on medical indications/ASCVD risk profile - He should follow up closely with her primary care physician for optimal vascular risk factors modifications including blood pressure goal less than 130/80 mmHg, HbA1c goal <7 and preferably 6-6.5 and optimal cholesterol management  - \par \par  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

## 2020-07-31 ENCOUNTER — RX RENEWAL (OUTPATIENT)
Age: 80
End: 2020-07-31

## 2020-08-12 ENCOUNTER — APPOINTMENT (OUTPATIENT)
Dept: CARDIOLOGY | Facility: CLINIC | Age: 80
End: 2020-08-12
Payer: MEDICARE

## 2020-08-12 ENCOUNTER — NON-APPOINTMENT (OUTPATIENT)
Age: 80
End: 2020-08-12

## 2020-08-12 VITALS
SYSTOLIC BLOOD PRESSURE: 135 MMHG | TEMPERATURE: 96.2 F | HEIGHT: 67 IN | DIASTOLIC BLOOD PRESSURE: 70 MMHG | OXYGEN SATURATION: 98 % | BODY MASS INDEX: 24.48 KG/M2 | WEIGHT: 156 LBS | HEART RATE: 61 BPM | RESPIRATION RATE: 17 BRPM

## 2020-08-12 DIAGNOSIS — R94.31 ABNORMAL ELECTROCARDIOGRAM [ECG] [EKG]: ICD-10-CM

## 2020-08-12 PROCEDURE — 93224 XTRNL ECG REC UP TO 48 HRS: CPT

## 2020-08-12 PROCEDURE — 99214 OFFICE O/P EST MOD 30 MIN: CPT

## 2020-08-12 PROCEDURE — 93000 ELECTROCARDIOGRAM COMPLETE: CPT | Mod: 59

## 2020-08-16 NOTE — PHYSICAL EXAM
[General Appearance - Well Developed] : well developed [Well Groomed] : well groomed [Normal Appearance] : normal appearance [General Appearance - Well Nourished] : well nourished [No Deformities] : no deformities [Normal Conjunctiva] : the conjunctiva exhibited no abnormalities [General Appearance - In No Acute Distress] : no acute distress [Eyelids - No Xanthelasma] : the eyelids demonstrated no xanthelasmas [Normal Oral Mucosa] : normal oral mucosa [No Oral Pallor] : no oral pallor [No Oral Cyanosis] : no oral cyanosis [Normal Jugular Venous A Waves Present] : normal jugular venous A waves present [Normal Jugular Venous V Waves Present] : normal jugular venous V waves present [Respiration, Rhythm And Depth] : normal respiratory rhythm and effort [No Jugular Venous Telles A Waves] : no jugular venous telles A waves [Auscultation Breath Sounds / Voice Sounds] : lungs were clear to auscultation bilaterally [Exaggerated Use Of Accessory Muscles For Inspiration] : no accessory muscle use [Heart Rate And Rhythm] : heart rate and rhythm were normal [Murmurs] : no murmurs present [Heart Sounds] : normal S1 and S2 [Abdomen Tenderness] : non-tender [Abdomen Soft] : soft [Abdomen Mass (___ Cm)] : no abdominal mass palpated [Nail Clubbing] : no clubbing of the fingernails [Abnormal Walk] : normal gait [Cyanosis, Localized] : no localized cyanosis [Petechial Hemorrhages (___cm)] : no petechial hemorrhages [Skin Color & Pigmentation] : normal skin color and pigmentation [No Venous Stasis] : no venous stasis [] : no rash [Skin Lesions] : no skin lesions [No Xanthoma] : no  xanthoma was observed [No Skin Ulcers] : no skin ulcer [Oriented To Time, Place, And Person] : oriented to person, place, and time [Affect] : the affect was normal [Mood] : the mood was normal [No Anxiety] : not feeling anxious [FreeTextEntry1] : uses  a cane

## 2020-08-16 NOTE — REASON FOR VISIT
[Follow-Up - Clinic] : a clinic follow-up of [Coronary Artery Disease] : coronary artery disease [Abnormal ECG] : an abnormal ECG [Spouse] : spouse [FreeTextEntry1] : zeinab is asymptomatic however we note some widening of the qrs complex and a Holter is applied.

## 2020-08-16 NOTE — ASSESSMENT
[FreeTextEntry1] : More than half of the face to face encounter of  25 min  was spent in counseling the patient with respect to  \par \par Quality measures \par Tobacco intervention not indicated\par Statin for prevention of cardiovascular disease atorva 10\par Hypertension compensated\par Aspirin for ischemic vascular disease 81 mg\par Tobacco screening cessation and intervention not indicated\par \par Medical necessity\par This is a high encounter based upon two or more chronic illnesses with mild exacerbation requiring further management and evaluation.   .

## 2020-08-19 ENCOUNTER — NON-APPOINTMENT (OUTPATIENT)
Age: 80
End: 2020-08-19

## 2020-09-17 LAB
ALBUMIN SERPL ELPH-MCNC: 4.3 G/DL
ALP BLD-CCNC: 91 U/L
ALT SERPL-CCNC: 16 U/L
ANION GAP SERPL CALC-SCNC: 13 MMOL/L
AST SERPL-CCNC: 20 U/L
BILIRUB SERPL-MCNC: 0.5 MG/DL
BUN SERPL-MCNC: 30 MG/DL
CALCIUM SERPL-MCNC: 9.3 MG/DL
CHLORIDE SERPL-SCNC: 104 MMOL/L
CO2 SERPL-SCNC: 22 MMOL/L
CREAT SERPL-MCNC: 1.19 MG/DL
ESTIMATED AVERAGE GLUCOSE: 131 MG/DL
GLUCOSE SERPL-MCNC: 119 MG/DL
HBA1C MFR BLD HPLC: 6.2 %
POTASSIUM SERPL-SCNC: 4.4 MMOL/L
PROT SERPL-MCNC: 6.3 G/DL
SODIUM SERPL-SCNC: 139 MMOL/L

## 2020-09-24 ENCOUNTER — APPOINTMENT (OUTPATIENT)
Dept: FAMILY MEDICINE | Facility: CLINIC | Age: 80
End: 2020-09-24
Payer: MEDICARE

## 2020-09-24 VITALS — TEMPERATURE: 96.2 F | WEIGHT: 150 LBS | BODY MASS INDEX: 23.49 KG/M2

## 2020-09-24 PROCEDURE — G0008: CPT

## 2020-09-24 PROCEDURE — 90662 IIV NO PRSV INCREASED AG IM: CPT

## 2020-10-25 ENCOUNTER — RX RENEWAL (OUTPATIENT)
Age: 80
End: 2020-10-25

## 2020-11-29 ENCOUNTER — RX RENEWAL (OUTPATIENT)
Age: 80
End: 2020-11-29

## 2020-12-18 ENCOUNTER — NON-APPOINTMENT (OUTPATIENT)
Age: 80
End: 2020-12-18

## 2020-12-18 ENCOUNTER — APPOINTMENT (OUTPATIENT)
Dept: CARDIOLOGY | Facility: CLINIC | Age: 80
End: 2020-12-18
Payer: MEDICARE

## 2020-12-18 VITALS
BODY MASS INDEX: 24.33 KG/M2 | OXYGEN SATURATION: 96 % | TEMPERATURE: 96.2 F | DIASTOLIC BLOOD PRESSURE: 80 MMHG | RESPIRATION RATE: 17 BRPM | HEART RATE: 69 BPM | HEIGHT: 67 IN | SYSTOLIC BLOOD PRESSURE: 135 MMHG | WEIGHT: 155 LBS

## 2020-12-18 PROCEDURE — 93000 ELECTROCARDIOGRAM COMPLETE: CPT

## 2020-12-18 PROCEDURE — 99213 OFFICE O/P EST LOW 20 MIN: CPT

## 2020-12-23 PROBLEM — J06.9 VIRAL URI WITH COUGH: Status: RESOLVED | Noted: 2020-01-23 | Resolved: 2020-12-23

## 2021-01-05 ENCOUNTER — APPOINTMENT (OUTPATIENT)
Dept: NEUROLOGY | Facility: CLINIC | Age: 81
End: 2021-01-05
Payer: MEDICARE

## 2021-01-05 VITALS
WEIGHT: 150 LBS | HEIGHT: 67 IN | HEART RATE: 67 BPM | BODY MASS INDEX: 23.54 KG/M2 | SYSTOLIC BLOOD PRESSURE: 125 MMHG | DIASTOLIC BLOOD PRESSURE: 71 MMHG

## 2021-01-05 VITALS — TEMPERATURE: 95.4 F

## 2021-01-05 PROCEDURE — 99072 ADDL SUPL MATRL&STAF TM PHE: CPT

## 2021-01-05 PROCEDURE — 99214 OFFICE O/P EST MOD 30 MIN: CPT

## 2021-01-05 NOTE — HISTORY OF PRESENT ILLNESS
[FreeTextEntry1] : HPI-Interval Hx 20210105:\par Taking Aricept at night, doing ok.\par Sleep ok.\par Appetite: ok.\par Motor: stable, limited by COVID, but no major changes. \par He has had a few episodes where he has thought something was taken from him, and in a few occasions he has been speaking about the past as if he was there, unclear if there were VH or AH. He denies.\par He has been speaking [place] with his brothers and cousins, from time to time.\par \par ADL: full, needs some reminders\par IADL: no driving anymore, since NPT (?card sorting?); wife has always done finances and bills and taxes; never good at PC; ok with TV and phone\par CDR: 0.5.\par \par \par HPI-interval hx [id number] RPA: AWT used.\par Has tried to change Aricept to the am, but got diarrhea, so taking it at night. \par Sleeps well.\par Appetite intact, no issues.\par Gait ok.\par \par \par HPI-interval hx [id number]:\par pt here with wife and daughter.\par He has been stable.\par NPT reviewed with Dr. Ewing, aMCI seems the case.\par He has been stable, maybe better, more attentive, makes extra efforts. \par More attentive to his hydration as well.\par ADL and IADL stable.\par Driving ok.\par Got CT heart and CoronaryAngioRx, so far ok. Still with some tachycardia. \par \par HPI: 77yo RH WM, with HTN, HLD, diet controlled DM II, ? CAD, here for report of forgetfulness. Radiculopathy in LE, with drop foot bilat, due to L4-L5 herniation (laminectomy in 2012).\par \par PMH: Pt seen by Dr. Levine recently.\par Per wife, he has shown, over the last 6-12 months, STM issues, forgetting recent events, upcoming appointments. No problems with names, locations, faces.\par No changes in behavior.\par Imaging and NPT done recently. 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 \par \par Motor symptoms: limited by LE radiculopathy, uses braces\par \par B/B: no issues, some polyuria. \par \par Psychiatric symptoms: no issues. \par \par ADL: intact\par IADL: full, drives, no issues\par CDR: 0.5.\par \par Professional status: retired, former .\par \par PCP and other physicians:\par -PCP: Carlito\par -Neuro: Abner.

## 2021-01-05 NOTE — ASSESSMENT
[FreeTextEntry1] : Assessment:\par 78yo RH WM, with vascular risk factors, PMH of laminectomy and residual foot drops, ataxic gait (pre DM), here with concerns of memory problems.STM issues, mild. 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\par -ataxic gait/sensory neuropathy/radiculopathy\par -Re trials: daughter mentioned an issue with the heart, unsure what, will have to review.\par \par Plan:\par -Aricept 10mg-continue\par -would consider for trials, but for now they prefer to hold off\par -encourage activity.\par \par A thorough discussion was entertained with the patient/caregiver regarding the use of psychoactive medications, their possible benefits and AE profile, including the risk of cardiovascular complications.\par We discussed the benefits of being active, physically and mentally, and the need to to establish a routine in this respect.\par Driving abilities and firearms possession and use were discussed, in relation to progression of the cognitive decline, and the need to assess them periodically.\par Patient/caregiver understand and agree with the plan.\par

## 2021-01-05 NOTE — PHYSICAL EXAM
[General Appearance - Alert] : alert [General Appearance - In No Acute Distress] : in no acute distress [Oriented To Time, Place, And Person] : oriented to person, place, and time [Impaired Insight] : insight and judgment were intact [Affect] : the affect was normal [Person] : oriented to person [Place] : oriented to place [Time] : oriented to time [Registration Intact] : recent registration memory intact [Concentration Intact] : normal concentrating ability [Visual Intact] : visual attention was ~T not ~L decreased [Naming Objects] : no difficulty naming common objects [Repeating Phrases] : no difficulty repeating a phrase [Writing A Sentence] : no difficulty writing a sentence [Fluency] : fluency intact [Comprehension] : comprehension intact [Reading] : reading intact [Past History] : adequate knowledge of personal past history [Total Score ___ / 30] : the patient achieved a score of [unfilled] /30 [Date / Time ___ / 5] : date / time [unfilled] / 5 [Place ___ / 5] : place [unfilled] / 5 [Registration ___ / 3] : registration [unfilled] / 3 [Serial Sevens ___/5] : serial sevens [unfilled] / 5 [Naming 2 Objects ___ / 2] : naming two objects [unfilled] / 2 [Repeating a Sentence ___ / 1] : repeating a sentence [unfilled] / 1 [Writing a Sentence ___ / 1] : write sentence [unfilled] / 1 [3-stage Verbal Command ___ / 3] : three-stage verbal command [unfilled] / 3 [Written Command ___ / 1] : written command [unfilled] / 1 [Copy a Design ___ / 1] : copy a design [unfilled] / 1 [Recall ___ / 3] : recall [unfilled] / 3 [Cranial Nerves Optic (II)] : visual acuity intact bilaterally,  visual fields full to confrontation, pupils equal round and reactive to light [Cranial Nerves Oculomotor (III)] : extraocular motion intact [Cranial Nerves Trigeminal (V)] : facial sensation intact symmetrically [Cranial Nerves Facial (VII)] : face symmetrical [Cranial Nerves Vestibulocochlear (VIII)] : hearing was intact bilaterally [Cranial Nerves Glossopharyngeal (IX)] : tongue and palate midline [Cranial Nerves Accessory (XI - Cranial And Spinal)] : head turning and shoulder shrug symmetric [Cranial Nerves Hypoglossal (XII)] : there was no tongue deviation with protrusion [Motor Strength] : muscle strength was normal in all four extremities [No Muscle Atrophy] : normal bulk in all four extremities [Motor Handedness Right-Handed] : the patient is right hand dominant [Sensation Tactile Decrease] : light touch was intact [Sensation Pain / Temperature Decrease] : pain and temperature was intact [Proprioception] : proprioception was intact [Romberg's Sign] : a positive Romberg's sign was present [Balance] : balance was intact [Limited Balance] : the patient's balance was impaired [1+] : Brachioradialis left 1+ [0] : Ankle jerk left 0 [Sclera] : the sclera and conjunctiva were normal [PERRL With Normal Accommodation] : pupils were equal in size, round, reactive to light, with normal accommodation [Extraocular Movements] : extraocular movements were intact [Optic Disc Abnormality] : the optic disc were normal in size and color [No APD] : no afferent pupillary defect [No HELGA] : no internuclear ophthalmoplegia [Full Visual Field] : full visual field [Outer Ear] : the ears and nose were normal in appearance [Oropharynx] : the oropharynx was normal [Neck Appearance] : the appearance of the neck was normal [Neck Cervical Mass (___cm)] : no neck mass was observed [Jugular Venous Distention Increased] : there was no jugular-venous distention [Thyroid Diffuse Enlargement] : the thyroid was not enlarged [Thyroid Nodule] : there were no palpable thyroid nodules [Auscultation Breath Sounds / Voice Sounds] : lungs were clear to auscultation bilaterally [Heart Rate And Rhythm] : heart rate was normal and rhythm regular [Heart Sounds] : normal S1 and S2 [Heart Sounds Gallop] : no gallops [Murmurs] : no murmurs [Heart Sounds Pericardial Friction Rub] : no pericardial rub [Arterial Pulses Carotid] : carotid pulses were normal with no bruits [Full Pulse] : the pedal pulses are present [Edema] : there was no peripheral edema [Bowel Sounds] : normal bowel sounds [Abdomen Soft] : soft [Abdomen Tenderness] : non-tender [Abdomen Mass (___ Cm)] : no abdominal mass palpated [No CVA Tenderness] : no ~M costovertebral angle tenderness [No Spinal Tenderness] : no spinal tenderness [Nail Clubbing] : no clubbing  or cyanosis of the fingernails [Involuntary Movements] : no involuntary movements were seen [Musculoskeletal - Swelling] : no joint swelling seen [Motor Tone] : muscle strength and tone were normal [Skin Color & Pigmentation] : normal skin color and pigmentation [Skin Turgor] : normal skin turgor [] : no rash [Short Term Intact] : short term memory impaired [Span Intact] : the attention span was decreased [Current Events] : inadequate knowledge of current events [Motor Strength Upper Extremities Bilaterally] : strength was normal in both upper extremities [Motor Strength Lower Extremities Bilaterally] : strength was normal in both lower extremities [Allodynia] : no ~T allodynia present [Dysesthesia] : no dysesthesia [Hyperesthesia] : no hyperesthesia [Past-pointing] : there was no past-pointing [Tremor] : no tremor present [Plantar Reflex Right Only] : normal on the right [Plantar Reflex Left Only] : normal on the left [FreeTextEntry4] : Mental Status Exam\par Presidents: 0/5\par Alternating Pattern: ok\par Spiral: ok\par Clock: 3/3-more hesitant to draw numbers\par Repetition: ok\par R/L discrimination on self and examiner: ok\par Cross-line commands: ok\par Praxis: \par -Motor: ok\par -Dynamic/Luria: ok\par -Ideomotor/Imitation: ok \par -Ideational/writing/closing-in: ok\par -Dressing: ok. [FreeTextEntry6] : Foot drop bilat, uses braces [FreeTextEntry7] : loss of vib sensation in UE and LE distally [FreeTextEntry8] : walks with cane, stepping gait, cautioned [FreeTextEntry1] : bilat cataracts

## 2021-01-05 NOTE — DATA REVIEWED
[de-identified] : EXAM: MR BRAIN \par \par \par PROCEDURE DATE: 12/05/2018 \par \par \par \par INTERPRETATION: CLINICAL STATEMENT: Increased memory loss \par \par TECHNIQUE: MRI of the brain was performed without gadolinium. \par \par COMPARISON: None. \par \par FINDINGS: \par There is moderate diffuse parenchymal volume loss. There are T2 prolongation \par signal abnormalities in the periventricular subcortical white matter likely \par related to mild chronic microvascular ischemic changes. \par \par There is no acute parenchymal hemorrhage, parenchymal mass, mass effect or \par midline shift. There is no extra-axial fluid collection. There is no \par hydrocephalus. There is no acute infarct. Partial empty sella \par \par Loss of normal flow void in the intradural segment of distal right vertebral \par artery noted. \par \par Mucosal thickening paranasal sinuses \par \par IMPRESSION: \par No acute intracranial hemorrhage or acute infarct. \par \par Loss of normal flow void in the intradural segment of distal right vertebral \par artery which may be related to occlusion/high-grade stenosis. \par \par \par \par \par \par \par \par \par \par ERYN GUPTA M.D., ATTENDING RADIOLOGIST \par This document has been electronically signed. Dec 5 2018 2:33PM \par  [de-identified] : See chart. [de-identified] : Labs done, all wnl.

## 2021-02-01 ENCOUNTER — APPOINTMENT (OUTPATIENT)
Dept: NEUROLOGY | Facility: CLINIC | Age: 81
End: 2021-02-01

## 2021-02-04 ENCOUNTER — NON-APPOINTMENT (OUTPATIENT)
Age: 81
End: 2021-02-04

## 2021-02-05 ENCOUNTER — APPOINTMENT (OUTPATIENT)
Dept: RADIOLOGY | Facility: HOSPITAL | Age: 81
End: 2021-02-05
Payer: MEDICARE

## 2021-02-05 ENCOUNTER — OUTPATIENT (OUTPATIENT)
Dept: OUTPATIENT SERVICES | Facility: HOSPITAL | Age: 81
LOS: 1 days | End: 2021-02-05
Payer: MEDICARE

## 2021-02-05 ENCOUNTER — APPOINTMENT (OUTPATIENT)
Dept: FAMILY MEDICINE | Facility: CLINIC | Age: 81
End: 2021-02-05
Payer: MEDICARE

## 2021-02-05 VITALS — DIASTOLIC BLOOD PRESSURE: 70 MMHG | SYSTOLIC BLOOD PRESSURE: 130 MMHG

## 2021-02-05 VITALS
HEIGHT: 67 IN | DIASTOLIC BLOOD PRESSURE: 70 MMHG | OXYGEN SATURATION: 99 % | SYSTOLIC BLOOD PRESSURE: 160 MMHG | HEART RATE: 107 BPM | BODY MASS INDEX: 23.86 KG/M2 | RESPIRATION RATE: 16 BRPM | TEMPERATURE: 96.8 F | WEIGHT: 152 LBS

## 2021-02-05 DIAGNOSIS — Z98.89 OTHER SPECIFIED POSTPROCEDURAL STATES: Chronic | ICD-10-CM

## 2021-02-05 DIAGNOSIS — M25.471 EFFUSION, RIGHT ANKLE: ICD-10-CM

## 2021-02-05 PROCEDURE — 99214 OFFICE O/P EST MOD 30 MIN: CPT

## 2021-02-05 PROCEDURE — 99072 ADDL SUPL MATRL&STAF TM PHE: CPT

## 2021-02-05 PROCEDURE — 73610 X-RAY EXAM OF ANKLE: CPT | Mod: 26,RT

## 2021-02-05 PROCEDURE — 73610 X-RAY EXAM OF ANKLE: CPT

## 2021-02-05 NOTE — REVIEW OF SYSTEMS
[Hearing Loss] : hearing loss [Joint Pain] : joint pain [Joint Stiffness] : joint stiffness [Joint Swelling] : joint swelling [Unsteady Walk] : no ataxia [Negative] : Heme/Lymph [de-identified] : memories changes

## 2021-02-05 NOTE — PHYSICAL EXAM
[Well-Appearing] : well-appearing [Regular Rhythm] : with a regular rhythm [Normal S1, S2] : normal S1 and S2 [de-identified] : in NAD [de-identified] : deformities of both feet and ankles with swelling right ankle amd mild erythema no streaking no signs of infection  good cap refill no able to find pedial pulses

## 2021-02-05 NOTE — HISTORY OF PRESENT ILLNESS
[FreeTextEntry8] : 81 y/o HTN HLD DM2 neuropathy dropped feet here with c/o 5 days swelling and pain of right ankle join Pt denies missed steps or trauma no fevers chills no recent sx feeling fine otherwise pain on scale 2/10 but at night worse per wife diff walking. only tried elevation and never went to get med sent in

## 2021-02-17 ENCOUNTER — APPOINTMENT (OUTPATIENT)
Dept: NEUROLOGY | Facility: CLINIC | Age: 81
End: 2021-02-17
Payer: MEDICARE

## 2021-02-17 VITALS
DIASTOLIC BLOOD PRESSURE: 79 MMHG | HEIGHT: 67 IN | WEIGHT: 152 LBS | SYSTOLIC BLOOD PRESSURE: 171 MMHG | HEART RATE: 61 BPM | BODY MASS INDEX: 23.86 KG/M2

## 2021-02-17 PROCEDURE — 99213 OFFICE O/P EST LOW 20 MIN: CPT

## 2021-02-17 PROCEDURE — 99072 ADDL SUPL MATRL&STAF TM PHE: CPT

## 2021-02-28 ENCOUNTER — RX RENEWAL (OUTPATIENT)
Age: 81
End: 2021-02-28

## 2021-03-18 ENCOUNTER — APPOINTMENT (OUTPATIENT)
Dept: FAMILY MEDICINE | Facility: CLINIC | Age: 81
End: 2021-03-18
Payer: MEDICARE

## 2021-03-18 ENCOUNTER — OUTPATIENT (OUTPATIENT)
Dept: OUTPATIENT SERVICES | Facility: HOSPITAL | Age: 81
LOS: 1 days | End: 2021-03-18
Payer: MEDICARE

## 2021-03-18 ENCOUNTER — RESULT REVIEW (OUTPATIENT)
Age: 81
End: 2021-03-18

## 2021-03-18 VITALS
DIASTOLIC BLOOD PRESSURE: 70 MMHG | SYSTOLIC BLOOD PRESSURE: 140 MMHG | HEIGHT: 67 IN | WEIGHT: 153 LBS | OXYGEN SATURATION: 99 % | BODY MASS INDEX: 24.01 KG/M2 | TEMPERATURE: 97.8 F | HEART RATE: 72 BPM | RESPIRATION RATE: 16 BRPM

## 2021-03-18 DIAGNOSIS — Z98.89 OTHER SPECIFIED POSTPROCEDURAL STATES: Chronic | ICD-10-CM

## 2021-03-18 DIAGNOSIS — R60.0 LOCALIZED EDEMA: ICD-10-CM

## 2021-03-18 PROCEDURE — 93971 EXTREMITY STUDY: CPT | Mod: 26,LT

## 2021-03-18 PROCEDURE — 93971 EXTREMITY STUDY: CPT

## 2021-03-18 PROCEDURE — 99072 ADDL SUPL MATRL&STAF TM PHE: CPT

## 2021-03-18 PROCEDURE — 99212 OFFICE O/P EST SF 10 MIN: CPT

## 2021-03-19 ENCOUNTER — NON-APPOINTMENT (OUTPATIENT)
Age: 81
End: 2021-03-19

## 2021-03-19 NOTE — PHYSICAL EXAM
[Normal S1, S2] : normal S1 and S2 [Normal] : soft, non-tender, non-distended, no masses palpated, no HSM and normal bowel sounds [de-identified] : IRR [de-identified] : 2+ edema left 1+ edema right leg dropped feet

## 2021-03-19 NOTE — HISTORY OF PRESENT ILLNESS
[FreeTextEntry8] : 81 y/o here with wife for acute visit bilateral leg swelling started with the left leg on Saturday and worsen and now has both legs L>R no CP SOB palpitations or dizziness does states ate caned corn might have more salt c/o some pain on left ankle buth otherwise no [pain  Notes cannot wear loose shoes since has dropped feet and has to be wearing brace. Pt's wife worried about clot on left leg since it is more swollen

## 2021-03-19 NOTE — REVIEW OF SYSTEMS
[Joint Pain] : joint pain [Unsteady Walk] : ataxia [Negative] : Heme/Lymph [FreeTextEntry9] : left ankle pain and swelling both legs

## 2021-03-19 NOTE — ASSESSMENT
[FreeTextEntry1] : advised increased Lasix\par elevate legs \par avoid salt containing foods\par might need support socks 20/30 pressure

## 2021-04-16 ENCOUNTER — APPOINTMENT (OUTPATIENT)
Dept: CARDIOLOGY | Facility: CLINIC | Age: 81
End: 2021-04-16
Payer: MEDICARE

## 2021-04-16 ENCOUNTER — NON-APPOINTMENT (OUTPATIENT)
Age: 81
End: 2021-04-16

## 2021-04-16 VITALS
DIASTOLIC BLOOD PRESSURE: 69 MMHG | WEIGHT: 154 LBS | BODY MASS INDEX: 24.17 KG/M2 | RESPIRATION RATE: 16 BRPM | HEART RATE: 62 BPM | TEMPERATURE: 97.2 F | HEIGHT: 67 IN | OXYGEN SATURATION: 99 % | SYSTOLIC BLOOD PRESSURE: 131 MMHG

## 2021-04-16 PROCEDURE — 99213 OFFICE O/P EST LOW 20 MIN: CPT

## 2021-04-16 PROCEDURE — 93000 ELECTROCARDIOGRAM COMPLETE: CPT

## 2021-04-16 PROCEDURE — 99072 ADDL SUPL MATRL&STAF TM PHE: CPT

## 2021-04-17 NOTE — PHYSICAL EXAM
[General Appearance - Well Developed] : well developed [Normal Appearance] : normal appearance [Well Groomed] : well groomed [General Appearance - Well Nourished] : well nourished [No Deformities] : no deformities [General Appearance - In No Acute Distress] : no acute distress [Eyelids - No Xanthelasma] : the eyelids demonstrated no xanthelasmas [Normal Conjunctiva] : the conjunctiva exhibited no abnormalities [Normal Oral Mucosa] : normal oral mucosa [No Oral Pallor] : no oral pallor [No Oral Cyanosis] : no oral cyanosis [Normal Jugular Venous A Waves Present] : normal jugular venous A waves present [Normal Jugular Venous V Waves Present] : normal jugular venous V waves present [No Jugular Venous Telles A Waves] : no jugular venous telles A waves [Respiration, Rhythm And Depth] : normal respiratory rhythm and effort [Exaggerated Use Of Accessory Muscles For Inspiration] : no accessory muscle use [Auscultation Breath Sounds / Voice Sounds] : lungs were clear to auscultation bilaterally [Heart Rate And Rhythm] : heart rate and rhythm were normal [Heart Sounds] : normal S1 and S2 [Murmurs] : no murmurs present [Abdomen Soft] : soft [Abdomen Tenderness] : non-tender [Abdomen Mass (___ Cm)] : no abdominal mass palpated [Abnormal Walk] : normal gait [Nail Clubbing] : no clubbing of the fingernails [Cyanosis, Localized] : no localized cyanosis [Petechial Hemorrhages (___cm)] : no petechial hemorrhages [Skin Color & Pigmentation] : normal skin color and pigmentation [] : no rash [No Venous Stasis] : no venous stasis [No Skin Ulcers] : no skin ulcer [Skin Lesions] : no skin lesions [No Xanthoma] : no  xanthoma was observed [Oriented To Time, Place, And Person] : oriented to person, place, and time [Affect] : the affect was normal [Mood] : the mood was normal [No Anxiety] : not feeling anxious [FreeTextEntry1] : uses  a cane

## 2021-05-07 ENCOUNTER — RX RENEWAL (OUTPATIENT)
Age: 81
End: 2021-05-07

## 2021-06-07 ENCOUNTER — RX RENEWAL (OUTPATIENT)
Age: 81
End: 2021-06-07

## 2021-06-21 ENCOUNTER — APPOINTMENT (OUTPATIENT)
Dept: MRI IMAGING | Facility: HOSPITAL | Age: 81
End: 2021-06-21
Payer: MEDICARE

## 2021-06-21 ENCOUNTER — OUTPATIENT (OUTPATIENT)
Dept: OUTPATIENT SERVICES | Facility: HOSPITAL | Age: 81
LOS: 1 days | End: 2021-06-21
Payer: MEDICARE

## 2021-06-21 DIAGNOSIS — Z98.89 OTHER SPECIFIED POSTPROCEDURAL STATES: Chronic | ICD-10-CM

## 2021-06-21 DIAGNOSIS — I65.29 OCCLUSION AND STENOSIS OF UNSPECIFIED CAROTID ARTERY: ICD-10-CM

## 2021-06-21 PROCEDURE — 70544 MR ANGIOGRAPHY HEAD W/O DYE: CPT | Mod: 26

## 2021-06-21 PROCEDURE — 70549 MR ANGIOGRAPH NECK W/O&W/DYE: CPT | Mod: 26

## 2021-06-21 PROCEDURE — A9579: CPT

## 2021-06-21 PROCEDURE — 70549 MR ANGIOGRAPH NECK W/O&W/DYE: CPT

## 2021-06-21 PROCEDURE — 70544 MR ANGIOGRAPHY HEAD W/O DYE: CPT

## 2021-07-06 ENCOUNTER — APPOINTMENT (OUTPATIENT)
Dept: NEUROLOGY | Facility: CLINIC | Age: 81
End: 2021-07-06
Payer: MEDICARE

## 2021-07-06 VITALS
SYSTOLIC BLOOD PRESSURE: 136 MMHG | HEIGHT: 66 IN | HEART RATE: 64 BPM | WEIGHT: 155 LBS | DIASTOLIC BLOOD PRESSURE: 82 MMHG | BODY MASS INDEX: 24.91 KG/M2

## 2021-07-06 PROCEDURE — 99072 ADDL SUPL MATRL&STAF TM PHE: CPT

## 2021-07-06 PROCEDURE — 99214 OFFICE O/P EST MOD 30 MIN: CPT

## 2021-07-06 RX ORDER — ATORVASTATIN CALCIUM 10 MG/1
10 TABLET, FILM COATED ORAL
Qty: 90 | Refills: 1 | Status: DISCONTINUED | COMMUNITY
Start: 2019-09-11 | End: 2021-07-06

## 2021-07-06 NOTE — ASSESSMENT
[FreeTextEntry1] : Assessment:\par 81yo RH WM, with vascular risk factors, PMH of laminectomy and residual foot drops, ataxic gait (pre DM), here with concerns of memory problems.STM issues, mild. 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\par -ataxic gait/sensory neuropathy/radiculopathy\par \par -Re trials: daughter mentioned an issue with the heart - likely CAD, but it appears stable- will have to review with cardiology\par \par Plan:\par -Aricept 10mg-continue\par -would consider for trials, but for now they prefer to hold off\par -encourage activity.\par \par A thorough discussion was entertained with the patient/caregiver regarding the use of psychoactive medications, their possible benefits and AE profile, including the risk of cardiovascular complications.\par We discussed the benefits of being active, physically and mentally, and the need to to establish a routine in this respect.\par Driving abilities and firearms possession and use were discussed, in relation to progression of the cognitive decline, and the need to assess them periodically.\par Patient/caregiver understand and agree with the plan.\par

## 2021-07-06 NOTE — DATA REVIEWED
[de-identified] : EXAM: MR BRAIN \par \par \par PROCEDURE DATE: 12/05/2018 \par \par \par \par INTERPRETATION: CLINICAL STATEMENT: Increased memory loss \par \par TECHNIQUE: MRI of the brain was performed without gadolinium. \par \par COMPARISON: None. \par \par FINDINGS: \par There is moderate diffuse parenchymal volume loss. There are T2 prolongation \par signal abnormalities in the periventricular subcortical white matter likely \par related to mild chronic microvascular ischemic changes. \par \par There is no acute parenchymal hemorrhage, parenchymal mass, mass effect or \par midline shift. There is no extra-axial fluid collection. There is no \par hydrocephalus. There is no acute infarct. Partial empty sella \par \par Loss of normal flow void in the intradural segment of distal right vertebral \par artery noted. \par \par Mucosal thickening paranasal sinuses \par \par IMPRESSION: \par No acute intracranial hemorrhage or acute infarct. \par \par Loss of normal flow void in the intradural segment of distal right vertebral \par artery which may be related to occlusion/high-grade stenosis. \par \par \par \par \par \par \par \par \par \par ERYN GUPTA M.D., ATTENDING RADIOLOGIST \par This document has been electronically signed. Dec 5 2018 2:33PM \par  [de-identified] : See chart. [de-identified] : Labs done, all wnl.

## 2021-07-06 NOTE — HISTORY OF PRESENT ILLNESS
[FreeTextEntry1] : COVID VACCINATION FULL.\par NO COVID.\par \par HPI-Interval Hx [id number]:\par pt here with wife. \par Per pt he feels well, things are stable. \par Per wife, he has been stable.\par Weight and appetite ok.\par Sleep ok.\par Motor ok, though one recent night he tripped on something on the floor, mild fall, no consequences.\par Has started Allopurinol for Gout. \par \par \par HPI-Interval Hx [id number]:\par Taking Aricept at night, doing ok.\par Sleep ok.\par Appetite: ok.\par Motor: stable, limited by COVID, but no major changes. \par He has had a few episodes where he has thought something was taken from him, and in a few occasions he has been speaking about the past as if he was there, unclear if there were VH or AH. He denies.\par He has been speaking [place] with his brothers and cousins, from time to time.\par \par ADL: full, needs some reminders\par IADL: no driving anymore, since NPT (?card sorting?); wife has always done finances and bills and taxes; never good at PC; ok with TV and phone\par CDR: 0.5.\par \par \par HPI-interval hx [id number] RPA: AWT used.\par Has tried to change Aricept to the am, but got diarrhea, so taking it at night. \par Sleeps well.\par Appetite intact, no issues.\par Gait ok.\par \par \par HPI-interval hx [id number]:\par pt here with wife and daughter.\par He has been stable.\par NPT reviewed with Dr. Ewing, aMCI seems the case.\par He has been stable, maybe better, more attentive, makes extra efforts. \par More attentive to his hydration as well.\par ADL and IADL stable.\par Driving ok.\par Got CT heart and CoronaryAngioRx, so far ok. Still with some tachycardia. \par \par HPI: 79yo RH WM, with HTN, HLD, diet controlled DM II, ? CAD, here for report of forgetfulness. Radiculopathy in LE, with drop foot bilat, due to L4-L5 herniation (laminectomy in 2012).\par \par PMH: Pt seen by Dr. Levine recently.\par Per wife, he has shown, over the last 6-12 months, STM issues, forgetting recent events, upcoming appointments. No problems with names, locations, faces.\par No changes in behavior.\par Imaging and NPT done recently. 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 \par \par Motor symptoms: limited by LE radiculopathy, uses braces\par \par B/B: no issues, some polyuria. \par \par Psychiatric symptoms: no issues. \par \par ADL: intact\par IADL: full, drives, no issues\par CDR: 0.5.\par \par Professional status: retired, former .\par \par PCP and other physicians:\par -PCP: Carlito\par -Neuro: Abner.

## 2021-07-26 DIAGNOSIS — M79.89 OTHER SPECIFIED SOFT TISSUE DISORDERS: ICD-10-CM

## 2021-07-26 DIAGNOSIS — R53.1 WEAKNESS: ICD-10-CM

## 2021-07-29 LAB
ALBUMIN SERPL ELPH-MCNC: 4.1 G/DL
ALP BLD-CCNC: 116 U/L
ALT SERPL-CCNC: 22 U/L
ANION GAP SERPL CALC-SCNC: 15 MMOL/L
AST SERPL-CCNC: 31 U/L
BASOPHILS # BLD AUTO: 0.02 K/UL
BASOPHILS NFR BLD AUTO: 0.2 %
BILIRUB SERPL-MCNC: 0.4 MG/DL
BUN SERPL-MCNC: 29 MG/DL
CALCIUM SERPL-MCNC: 9.6 MG/DL
CHLORIDE SERPL-SCNC: 102 MMOL/L
CHOLEST SERPL-MCNC: 140 MG/DL
CO2 SERPL-SCNC: 21 MMOL/L
CREAT SERPL-MCNC: 1.12 MG/DL
CREAT SPEC-SCNC: 149 MG/DL
EOSINOPHIL # BLD AUTO: 0.04 K/UL
EOSINOPHIL NFR BLD AUTO: 0.5 %
ESTIMATED AVERAGE GLUCOSE: 143 MG/DL
FOLATE SERPL-MCNC: >20 NG/ML
GLUCOSE SERPL-MCNC: 115 MG/DL
HBA1C MFR BLD HPLC: 6.6 %
HCT VFR BLD CALC: 39.8 %
HDLC SERPL-MCNC: 54 MG/DL
HGB BLD-MCNC: 12.9 G/DL
IMM GRANULOCYTES NFR BLD AUTO: 0.2 %
LDLC SERPL CALC-MCNC: 73 MG/DL
LYMPHOCYTES # BLD AUTO: 1.8 K/UL
LYMPHOCYTES NFR BLD AUTO: 21.5 %
MAN DIFF?: NORMAL
MCHC RBC-ENTMCNC: 32.4 GM/DL
MCHC RBC-ENTMCNC: 34 PG
MCV RBC AUTO: 105 FL
MICROALBUMIN 24H UR DL<=1MG/L-MCNC: <1.2 MG/DL
MICROALBUMIN/CREAT 24H UR-RTO: NORMAL MG/G
MONOCYTES # BLD AUTO: 0.67 K/UL
MONOCYTES NFR BLD AUTO: 8 %
NEUTROPHILS # BLD AUTO: 5.81 K/UL
NEUTROPHILS NFR BLD AUTO: 69.6 %
NONHDLC SERPL-MCNC: 86 MG/DL
PLATELET # BLD AUTO: 222 K/UL
POTASSIUM SERPL-SCNC: 4.5 MMOL/L
PROT SERPL-MCNC: 6.4 G/DL
RBC # BLD: 3.79 M/UL
RBC # FLD: 13.7 %
SODIUM SERPL-SCNC: 139 MMOL/L
TRIGL SERPL-MCNC: 65 MG/DL
TSH SERPL-ACNC: 0.79 UIU/ML
VIT B12 SERPL-MCNC: 1021 PG/ML
WBC # FLD AUTO: 8.36 K/UL

## 2021-08-04 ENCOUNTER — APPOINTMENT (OUTPATIENT)
Dept: NEUROLOGY | Facility: CLINIC | Age: 81
End: 2021-08-04
Payer: MEDICARE

## 2021-08-05 ENCOUNTER — NON-APPOINTMENT (OUTPATIENT)
Age: 81
End: 2021-08-05

## 2021-08-25 ENCOUNTER — NON-APPOINTMENT (OUTPATIENT)
Age: 81
End: 2021-08-25

## 2021-08-31 ENCOUNTER — APPOINTMENT (OUTPATIENT)
Dept: NEUROLOGY | Facility: CLINIC | Age: 81
End: 2021-08-31
Payer: MEDICARE

## 2021-08-31 VITALS
SYSTOLIC BLOOD PRESSURE: 154 MMHG | BODY MASS INDEX: 22.5 KG/M2 | HEART RATE: 65 BPM | HEIGHT: 66 IN | DIASTOLIC BLOOD PRESSURE: 73 MMHG | WEIGHT: 140 LBS

## 2021-08-31 DIAGNOSIS — G45.0 VERTEBRO-BASILAR ARTERY SYNDROME: ICD-10-CM

## 2021-08-31 DIAGNOSIS — I65.29 OCCLUSION AND STENOSIS OF UNSPECIFIED CAROTID ARTERY: ICD-10-CM

## 2021-08-31 PROCEDURE — 99214 OFFICE O/P EST MOD 30 MIN: CPT

## 2021-09-02 ENCOUNTER — APPOINTMENT (OUTPATIENT)
Dept: FAMILY MEDICINE | Facility: CLINIC | Age: 81
End: 2021-09-02
Payer: MEDICARE

## 2021-09-02 VITALS
RESPIRATION RATE: 14 BRPM | SYSTOLIC BLOOD PRESSURE: 140 MMHG | WEIGHT: 150 LBS | BODY MASS INDEX: 24.21 KG/M2 | HEART RATE: 52 BPM | DIASTOLIC BLOOD PRESSURE: 70 MMHG | TEMPERATURE: 97.1 F

## 2021-09-02 DIAGNOSIS — M1A.9XX0 CHRONIC GOUT, UNSPECIFIED, W/OUT TOPHUS (TOPHI): ICD-10-CM

## 2021-09-02 DIAGNOSIS — I50.33 ACUTE ON CHRONIC DIASTOLIC (CONGESTIVE) HEART FAILURE: ICD-10-CM

## 2021-09-02 PROCEDURE — 99213 OFFICE O/P EST LOW 20 MIN: CPT

## 2021-09-02 NOTE — PHYSICAL EXAM
[No Acute Distress] : no acute distress [Well-Appearing] : well-appearing [Regular Rhythm] : with a regular rhythm [Normal S1, S2] : normal S1 and S2 [Soft] : abdomen soft [Non Tender] : non-tender [Non-distended] : non-distended [No CVA Tenderness] : no CVA  tenderness [No Spinal Tenderness] : no spinal tenderness [de-identified] : trace ankle swelling  [de-identified] : unstable gain uses brace both feet

## 2021-09-02 NOTE — HISTORY OF PRESENT ILLNESS
[Diabetes Mellitus] : Diabetes Mellitus [Hyperlipidemia] : Hyperlipidemia [Hypertension] : Hypertension [Other: ___] : [unfilled]: [Spouse] : spouse [FreeTextEntry6] : 80 y/o DM2 HTN HLD CAROL CAD Pt notes at home was 147 w/o clothes no recent weight loss but notes food get stuked in throat on and off Pt no bowel or bladder issues Notes gait instability worsening but refuses to be sent to PT. Pt denies pain SOB palpitations or dizziness Did see neuropsych eval and was advised therapy for CAROL but declined [No episodes] : No hypoglycemic episodes since the last visit. [Does not check] : Patient does not check blood glucose regularly [Understanding of foot care] : Patient expressed understanding of foot care [Most Recent A1C: ___] : Most recent A1C was [unfilled] [Does not check BP] : The patient is not checking blood pressure

## 2021-09-02 NOTE — COUNSELING
[Fall prevention counseling provided] : Fall prevention counseling provided [Adequate lighting] : Adequate lighting [No throw rugs] : No throw rugs [Use proper foot wear] : Use proper foot wear [Use recommended devices] : Use recommended devices [FreeTextEntry1] : walker cane

## 2021-09-03 NOTE — ED PROVIDER NOTE - NEUROLOGICAL, MLM
[Home] : at home, [unfilled] , at the time of the visit. [Medical Office: ([facility])___] : at the medical office located in  [Verbal consent obtained from patient] : the patient, [unfilled] [FreeTextEntry4] : Aimee Christianson Alert and oriented, no focal deficits, no motor or sensory deficits.

## 2021-09-10 ENCOUNTER — APPOINTMENT (OUTPATIENT)
Dept: CARDIOLOGY | Facility: CLINIC | Age: 81
End: 2021-09-10
Payer: MEDICARE

## 2021-09-10 ENCOUNTER — NON-APPOINTMENT (OUTPATIENT)
Age: 81
End: 2021-09-10

## 2021-09-10 VITALS
SYSTOLIC BLOOD PRESSURE: 144 MMHG | HEIGHT: 66 IN | RESPIRATION RATE: 16 BRPM | HEART RATE: 88 BPM | DIASTOLIC BLOOD PRESSURE: 78 MMHG | OXYGEN SATURATION: 98 % | TEMPERATURE: 97.4 F | WEIGHT: 153 LBS | BODY MASS INDEX: 24.59 KG/M2

## 2021-09-10 PROCEDURE — 93000 ELECTROCARDIOGRAM COMPLETE: CPT

## 2021-09-10 PROCEDURE — 99213 OFFICE O/P EST LOW 20 MIN: CPT

## 2021-09-12 ENCOUNTER — RX RENEWAL (OUTPATIENT)
Age: 81
End: 2021-09-12

## 2021-09-19 NOTE — PHYSICAL EXAM
[General Appearance - Well Developed] : well developed [General Appearance - Well Nourished] : well nourished [Well Groomed] : well groomed [No Deformities] : no deformities [Normal Appearance] : normal appearance [Eyelids - No Xanthelasma] : the eyelids demonstrated no xanthelasmas [General Appearance - In No Acute Distress] : no acute distress [Normal Conjunctiva] : the conjunctiva exhibited no abnormalities [No Oral Cyanosis] : no oral cyanosis [No Oral Pallor] : no oral pallor [Normal Oral Mucosa] : normal oral mucosa [No Jugular Venous Telles A Waves] : no jugular venous telles A waves [Normal Jugular Venous V Waves Present] : normal jugular venous V waves present [Normal Jugular Venous A Waves Present] : normal jugular venous A waves present [Auscultation Breath Sounds / Voice Sounds] : lungs were clear to auscultation bilaterally [Respiration, Rhythm And Depth] : normal respiratory rhythm and effort [Exaggerated Use Of Accessory Muscles For Inspiration] : no accessory muscle use [Murmurs] : no murmurs present [Heart Rate And Rhythm] : heart rate and rhythm were normal [Heart Sounds] : normal S1 and S2 [Abdomen Tenderness] : non-tender [Abdomen Soft] : soft [Nail Clubbing] : no clubbing of the fingernails [Abnormal Walk] : normal gait [Abdomen Mass (___ Cm)] : no abdominal mass palpated [Skin Color & Pigmentation] : normal skin color and pigmentation [Petechial Hemorrhages (___cm)] : no petechial hemorrhages [Cyanosis, Localized] : no localized cyanosis [] : no rash [No Skin Ulcers] : no skin ulcer [No Venous Stasis] : no venous stasis [Skin Lesions] : no skin lesions [No Xanthoma] : no  xanthoma was observed [Oriented To Time, Place, And Person] : oriented to person, place, and time [Affect] : the affect was normal [No Anxiety] : not feeling anxious [Mood] : the mood was normal [FreeTextEntry1] : uses  a cane

## 2021-09-19 NOTE — REASON FOR VISIT
[Follow-Up - Clinic] : a clinic follow-up of [Hyperlipidemia] : hyperlipidemia [Coronary Artery Disease] : coronary artery disease [FreeTextEntry1] : Michael is compensated and without new cardiac complaints

## 2021-11-21 ENCOUNTER — NON-APPOINTMENT (OUTPATIENT)
Age: 81
End: 2021-11-21

## 2021-11-23 ENCOUNTER — APPOINTMENT (OUTPATIENT)
Dept: NEUROLOGY | Facility: CLINIC | Age: 81
End: 2021-11-23
Payer: MEDICARE

## 2021-11-23 VITALS
HEIGHT: 66 IN | HEART RATE: 66 BPM | WEIGHT: 150 LBS | SYSTOLIC BLOOD PRESSURE: 179 MMHG | BODY MASS INDEX: 24.11 KG/M2 | DIASTOLIC BLOOD PRESSURE: 72 MMHG

## 2021-11-23 PROCEDURE — 99214 OFFICE O/P EST MOD 30 MIN: CPT

## 2021-11-23 RX ORDER — PREDNISONE 10 MG/1
10 TABLET ORAL
Qty: 28 | Refills: 0 | Status: DISCONTINUED | COMMUNITY
Start: 2021-02-04 | End: 2021-11-23

## 2021-11-23 NOTE — ASSESSMENT
[FreeTextEntry1] : Assessment:\par 81yo RH WM, with vascular risk factors, PMH of laminectomy and residual foot drops, ataxic gait (pre DM), here with concerns of memory problems.STM issues, mild. 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\par -ataxic gait/sensory neuropathy/radiculopathy\par \par Plan:\par -Aricept 10mg-continue\par -Pt prefers to hold off trials\par -encourage activity and socialization.\par \par A thorough discussion was entertained with the patient/caregiver regarding the use of psychoactive medications, their possible benefits and AE profile, including the risk of cardiovascular complications.\par We discussed the benefits of being active, physically and mentally, and the need to to establish a routine in this respect.\par Driving abilities and firearms possession and use were discussed, in relation to progression of the cognitive decline, and the need to assess them periodically.\par Patient/caregiver understand and agree with the plan.\par

## 2021-11-23 NOTE — HISTORY OF PRESENT ILLNESS
[FreeTextEntry1] : COVID VACCINATION FULL.\par NO COVID.\par \par \par HPI-Interval Hx 20211123:\par Per pt and family her has been stable, eats and sleeps well.\par Tries to keep busy at home, but very limited.\par Somehow does not want to go out too much, due to COVID.\par \par \par \par \par HPI-Interval Hx 20210706:\par pt here with wife. \par Per pt he feels well, things are stable. \par Per wife, he has been stable.\par Weight and appetite ok.\par Sleep ok.\par Motor ok, though one recent night he tripped on something on the floor, mild fall, no consequences.\par Has started Allopurinol for Gout. \par \par \par HPI-Interval Hx [id number]:\par Taking Aricept at night, doing ok.\par Sleep ok.\par Appetite: ok.\par Motor: stable, limited by COVID, but no major changes. \par He has had a few episodes where he has thought something was taken from him, and in a few occasions he has been speaking about the past as if he was there, unclear if there were VH or AH. He denies.\par He has been speaking Spanish with his brothers and cousins, from time to time.\par \par ADL: full, needs some reminders\par IADL: no driving anymore, since NPT (?card sorting?); wife has always done finances and bills and taxes; never good at PC; ok with TV and phone\par CDR: 0.5.\par \par \par HPI-interval hx [id number] RPA: AWT used.\par Has tried to change Aricept to the am, but got diarrhea, so taking it at night. \par Sleeps well.\par Appetite intact, no issues.\par Gait ok.\par \par \par HPI-interval hx [id number]:\par pt here with wife and daughter.\par He has been stable.\par NPT reviewed with Dr. Ewing, aMCI seems the case.\par He has been stable, maybe better, more attentive, makes extra efforts. \par More attentive to his hydration as well.\par ADL and IADL stable.\par Driving ok.\par Got CT heart and CoronaryAngioRx, so far ok. Still with some tachycardia. \par \par HPI: 77yo RH WM, with HTN, HLD, diet controlled DM II, ? CAD, here for report of forgetfulness. Radiculopathy in LE, with drop foot bilat, due to L4-L5 herniation (laminectomy in 2012).\par \par PMH: Pt seen by Dr. Levine recently.\par Per wife, he has shown, over the last 6-12 months, STM issues, forgetting recent events, upcoming appointments. No problems with names, locations, faces.\par No changes in behavior.\par Imaging and NPT done recently. 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 \par \par Motor symptoms: limited by LE radiculopathy, uses braces\par \par B/B: no issues, some polyuria. \par \par Psychiatric symptoms: no issues. \par \par ADL: intact\par IADL: full, drives, no issues\par CDR: 0.5.\par \par Professional status: retired, former .\par \par PCP and other physicians:\par -PCP: Carlito\par -Neuro: Abner.

## 2021-11-23 NOTE — DATA REVIEWED
[de-identified] : EXAM: MR BRAIN \par \par \par PROCEDURE DATE: 12/05/2018 \par \par \par \par INTERPRETATION: CLINICAL STATEMENT: Increased memory loss \par \par TECHNIQUE: MRI of the brain was performed without gadolinium. \par \par COMPARISON: None. \par \par FINDINGS: \par There is moderate diffuse parenchymal volume loss. There are T2 prolongation \par signal abnormalities in the periventricular subcortical white matter likely \par related to mild chronic microvascular ischemic changes. \par \par There is no acute parenchymal hemorrhage, parenchymal mass, mass effect or \par midline shift. There is no extra-axial fluid collection. There is no \par hydrocephalus. There is no acute infarct. Partial empty sella \par \par Loss of normal flow void in the intradural segment of distal right vertebral \par artery noted. \par \par Mucosal thickening paranasal sinuses \par \par IMPRESSION: \par No acute intracranial hemorrhage or acute infarct. \par \par Loss of normal flow void in the intradural segment of distal right vertebral \par artery which may be related to occlusion/high-grade stenosis. \par \par \par \par \par \par \par \par \par \par ERYN GUPTA M.D., ATTENDING RADIOLOGIST \par This document has been electronically signed. Dec 5 2018 2:33PM \par  [de-identified] : See chart. [de-identified] : Labs done, all wnl.

## 2021-12-02 ENCOUNTER — APPOINTMENT (OUTPATIENT)
Dept: FAMILY MEDICINE | Facility: CLINIC | Age: 81
End: 2021-12-02
Payer: MEDICARE

## 2021-12-02 VITALS — TEMPERATURE: 96.6 F

## 2021-12-02 PROCEDURE — 90662 IIV NO PRSV INCREASED AG IM: CPT

## 2021-12-02 PROCEDURE — G0008: CPT

## 2021-12-26 ENCOUNTER — RX RENEWAL (OUTPATIENT)
Age: 81
End: 2021-12-26

## 2022-01-13 ENCOUNTER — APPOINTMENT (OUTPATIENT)
Dept: FAMILY MEDICINE | Facility: CLINIC | Age: 82
End: 2022-01-13
Payer: MEDICARE

## 2022-01-13 VITALS
HEART RATE: 78 BPM | TEMPERATURE: 97 F | DIASTOLIC BLOOD PRESSURE: 74 MMHG | WEIGHT: 156 LBS | SYSTOLIC BLOOD PRESSURE: 152 MMHG | HEIGHT: 66 IN | BODY MASS INDEX: 25.07 KG/M2 | OXYGEN SATURATION: 96 % | RESPIRATION RATE: 16 BRPM

## 2022-01-13 DIAGNOSIS — Z23 ENCOUNTER FOR IMMUNIZATION: ICD-10-CM

## 2022-01-13 LAB — HBA1C MFR BLD HPLC: 6.6

## 2022-01-13 PROCEDURE — 83036 HEMOGLOBIN GLYCOSYLATED A1C: CPT | Mod: QW

## 2022-01-13 PROCEDURE — 99213 OFFICE O/P EST LOW 20 MIN: CPT

## 2022-01-13 RX ORDER — ZOSTER VACCINE RECOMBINANT, ADJUVANTED 50 MCG/0.5
50 KIT INTRAMUSCULAR
Qty: 1 | Refills: 1 | Status: ACTIVE | COMMUNITY
Start: 2022-01-13 | End: 1900-01-01

## 2022-01-13 NOTE — PHYSICAL EXAM
[No Acute Distress] : no acute distress [Well-Appearing] : well-appearing [Regular Rhythm] : with a regular rhythm [Normal S1, S2] : normal S1 and S2 [Soft] : abdomen soft [Non Tender] : non-tender [Non-distended] : non-distended [No CVA Tenderness] : no CVA  tenderness [No Spinal Tenderness] : no spinal tenderness [de-identified] : trace ankle swelling  [de-identified] : unstable gain uses brace both feet

## 2022-01-13 NOTE — HISTORY OF PRESENT ILLNESS
[Diabetes Mellitus] : Diabetes Mellitus [Hyperlipidemia] : Hyperlipidemia [Hypertension] : Hypertension [Other: ___] : [unfilled]: [Spouse] : spouse [No episodes] : No hypoglycemic episodes since the last visit. [Does not check] : Patient does not check blood glucose regularly [Understanding of foot care] : Patient expressed understanding of foot care [Most Recent A1C: ___] : Most recent A1C was [unfilled] [Does not check BP] : The patient is not checking blood pressure [FreeTextEntry6] : 82 y/o DM2 HTN HLD CAROL CAD Pt notes at home x/o recurrent knee pain and also feet swell up at times getting gout but now is better  Pt no bowel or urine issues No pain here now. pt also notes fels tired all the time low energy wife worried about anemia.  Wife notes he talks to himself all the time but his mental status is the same forgetful. Pt never went to GI eval dysphagia as per wife need new referral since still has issues no weight loss or bowel or bladder issues

## 2022-01-16 ENCOUNTER — RX RENEWAL (OUTPATIENT)
Age: 82
End: 2022-01-16

## 2022-02-12 LAB
ALBUMIN SERPL ELPH-MCNC: 4.1 G/DL
ALP BLD-CCNC: 121 U/L
ALT SERPL-CCNC: 18 U/L
ANION GAP SERPL CALC-SCNC: 10 MMOL/L
AST SERPL-CCNC: 24 U/L
BASOPHILS # BLD AUTO: 0.02 K/UL
BASOPHILS NFR BLD AUTO: 0.3 %
BILIRUB SERPL-MCNC: 0.6 MG/DL
BUN SERPL-MCNC: 25 MG/DL
CALCIUM SERPL-MCNC: 9.7 MG/DL
CHLORIDE SERPL-SCNC: 105 MMOL/L
CHOLEST SERPL-MCNC: 124 MG/DL
CO2 SERPL-SCNC: 24 MMOL/L
CREAT SERPL-MCNC: 1.19 MG/DL
CREAT SPEC-SCNC: 123 MG/DL
EOSINOPHIL # BLD AUTO: 0.27 K/UL
EOSINOPHIL NFR BLD AUTO: 4.6 %
FOLATE SERPL-MCNC: >20 NG/ML
GLUCOSE SERPL-MCNC: 102 MG/DL
HCT VFR BLD CALC: 41.7 %
HDLC SERPL-MCNC: 50 MG/DL
HGB BLD-MCNC: 13.8 G/DL
IMM GRANULOCYTES NFR BLD AUTO: 0.2 %
LDLC SERPL CALC-MCNC: 58 MG/DL
LYMPHOCYTES # BLD AUTO: 1.66 K/UL
LYMPHOCYTES NFR BLD AUTO: 28.3 %
MAN DIFF?: NORMAL
MCHC RBC-ENTMCNC: 33.1 GM/DL
MCHC RBC-ENTMCNC: 34.2 PG
MCV RBC AUTO: 103.2 FL
MICROALBUMIN 24H UR DL<=1MG/L-MCNC: <1.2 MG/DL
MICROALBUMIN/CREAT 24H UR-RTO: NORMAL MG/G
MONOCYTES # BLD AUTO: 0.65 K/UL
MONOCYTES NFR BLD AUTO: 11.1 %
NEUTROPHILS # BLD AUTO: 3.25 K/UL
NEUTROPHILS NFR BLD AUTO: 55.5 %
NONHDLC SERPL-MCNC: 74 MG/DL
PLATELET # BLD AUTO: 175 K/UL
POTASSIUM SERPL-SCNC: 4.7 MMOL/L
PROT SERPL-MCNC: 6.1 G/DL
RBC # BLD: 4.04 M/UL
RBC # FLD: 14.2 %
SODIUM SERPL-SCNC: 140 MMOL/L
TRIGL SERPL-MCNC: 78 MG/DL
TSH SERPL-ACNC: 1.52 UIU/ML
VIT B12 SERPL-MCNC: 742 PG/ML
WBC # FLD AUTO: 5.86 K/UL

## 2022-02-13 ENCOUNTER — RX RENEWAL (OUTPATIENT)
Age: 82
End: 2022-02-13

## 2022-03-11 ENCOUNTER — APPOINTMENT (OUTPATIENT)
Dept: CARDIOLOGY | Facility: CLINIC | Age: 82
End: 2022-03-11
Payer: MEDICARE

## 2022-03-11 ENCOUNTER — NON-APPOINTMENT (OUTPATIENT)
Age: 82
End: 2022-03-11

## 2022-03-11 VITALS
OXYGEN SATURATION: 99 % | WEIGHT: 157 LBS | BODY MASS INDEX: 25.23 KG/M2 | RESPIRATION RATE: 16 BRPM | TEMPERATURE: 97.2 F | HEART RATE: 71 BPM | SYSTOLIC BLOOD PRESSURE: 138 MMHG | HEIGHT: 66 IN | DIASTOLIC BLOOD PRESSURE: 69 MMHG

## 2022-03-11 PROCEDURE — 99212 OFFICE O/P EST SF 10 MIN: CPT

## 2022-03-11 PROCEDURE — 93000 ELECTROCARDIOGRAM COMPLETE: CPT

## 2022-03-15 NOTE — CARDIOLOGY SUMMARY
[___] : [unfilled] [LVEF ___%] : LVEF [unfilled]% [de-identified] : 3/11/22 normal/rhythm anterior vesicular block

## 2022-03-15 NOTE — REASON FOR VISIT
[FreeTextEntry1] : Mati was seen by dermatology group Dr. Celaya a cardiac standpoint is stable and without incarnate complaints

## 2022-03-15 NOTE — ASSESSMENT
[FreeTextEntry1] : We will discontinue aspirin as per new guidelines lipids are satisfactory although A1c is elevated

## 2022-03-15 NOTE — PHYSICAL EXAM
[General Appearance - Well Developed] : well developed [Well Groomed] : well groomed [Normal Appearance] : normal appearance [General Appearance - Well Nourished] : well nourished [No Deformities] : no deformities [General Appearance - In No Acute Distress] : no acute distress [Normal Conjunctiva] : the conjunctiva exhibited no abnormalities [Normal Oral Mucosa] : normal oral mucosa [Eyelids - No Xanthelasma] : the eyelids demonstrated no xanthelasmas [No Oral Pallor] : no oral pallor [No Oral Cyanosis] : no oral cyanosis [Normal Jugular Venous A Waves Present] : normal jugular venous A waves present [Normal Jugular Venous V Waves Present] : normal jugular venous V waves present [No Jugular Venous Telles A Waves] : no jugular venous telles A waves [Respiration, Rhythm And Depth] : normal respiratory rhythm and effort [Exaggerated Use Of Accessory Muscles For Inspiration] : no accessory muscle use [Auscultation Breath Sounds / Voice Sounds] : lungs were clear to auscultation bilaterally [Heart Rate And Rhythm] : heart rate and rhythm were normal [Heart Sounds] : normal S1 and S2 [Murmurs] : no murmurs present [Abdomen Tenderness] : non-tender [Abdomen Soft] : soft [Abnormal Walk] : normal gait [Abdomen Mass (___ Cm)] : no abdominal mass palpated [Cyanosis, Localized] : no localized cyanosis [Nail Clubbing] : no clubbing of the fingernails [Petechial Hemorrhages (___cm)] : no petechial hemorrhages [] : no rash [Skin Color & Pigmentation] : normal skin color and pigmentation [No Venous Stasis] : no venous stasis [Skin Lesions] : no skin lesions [No Skin Ulcers] : no skin ulcer [No Xanthoma] : no  xanthoma was observed [Oriented To Time, Place, And Person] : oriented to person, place, and time [Affect] : the affect was normal [Mood] : the mood was normal [No Anxiety] : not feeling anxious [FreeTextEntry1] : uses  a cane

## 2022-04-27 NOTE — REASON FOR VISIT
Would need a nurse visit or go to a pharmacy? [Follow-Up - Clinic] : a clinic follow-up of [Coronary Artery Disease] : coronary artery disease [Hyperlipidemia] : hyperlipidemia [FreeTextEntry1] : Michael is compensated and without new cardiac complaints

## 2022-06-15 ENCOUNTER — RX RENEWAL (OUTPATIENT)
Age: 82
End: 2022-06-15

## 2022-06-16 NOTE — ED ADULT NURSE NOTE - NS ED NURSE LEVEL OF CONSCIOUSNESS AFFECT
Calm Complex Repair And Epidermal Autograft Text: The defect edges were debeveled with a #15 scalpel blade.  The primary defect was closed partially with a complex linear closure.  Given the location of the defect, shape of the defect and the proximity to free margins an epidermal autograft was deemed most appropriate to repair the remaining defect.  The graft was trimmed to fit the size of the remaining defect.  The graft was then placed in the primary defect, oriented appropriately, and sutured into place.

## 2022-06-24 ENCOUNTER — RX RENEWAL (OUTPATIENT)
Age: 82
End: 2022-06-24

## 2022-07-18 ENCOUNTER — RESULT CHARGE (OUTPATIENT)
Age: 82
End: 2022-07-18

## 2022-07-18 ENCOUNTER — APPOINTMENT (OUTPATIENT)
Dept: FAMILY MEDICINE | Facility: CLINIC | Age: 82
End: 2022-07-18

## 2022-07-18 VITALS
OXYGEN SATURATION: 98 % | HEART RATE: 77 BPM | RESPIRATION RATE: 16 BRPM | BODY MASS INDEX: 24.53 KG/M2 | TEMPERATURE: 96.9 F | DIASTOLIC BLOOD PRESSURE: 78 MMHG | WEIGHT: 152 LBS | SYSTOLIC BLOOD PRESSURE: 167 MMHG

## 2022-07-18 DIAGNOSIS — R71.8 OTHER ABNORMALITY OF RED BLOOD CELLS: ICD-10-CM

## 2022-07-18 LAB — HBA1C MFR BLD HPLC: 7.3

## 2022-07-18 PROCEDURE — 99214 OFFICE O/P EST MOD 30 MIN: CPT

## 2022-07-18 NOTE — REVIEW OF SYSTEMS
[Unsteady Walk] : ataxia [Negative] : Heme/Lymph [FreeTextEntry9] : trace edema ankles and braces on

## 2022-07-18 NOTE — PHYSICAL EXAM
[No Acute Distress] : no acute distress [Well-Appearing] : well-appearing [Regular Rhythm] : with a regular rhythm [Normal S1, S2] : normal S1 and S2 [Soft] : abdomen soft [Non Tender] : non-tender [Non-distended] : non-distended [Normal] : no CVA or spinal tenderness [de-identified] : treace ankle edema

## 2022-07-18 NOTE — ASSESSMENT
[FreeTextEntry1] : d/w pt and wife ? SOB on exertion advised to d/w cardiology at next apt\par lungs clear reassured\par

## 2022-07-18 NOTE — HISTORY OF PRESENT ILLNESS
[FreeTextEntry1] : f/u per wife SOB on exertion  [de-identified] : 89 y/o here with wife for f/u notes feels fine but wife notes when [place] one small bag of groceries gets very winded and needs to rest. Pt denies feeling SOB CP palpitations or dizziness.  Pt denies bowel or bladder issues and notes ankles are weak and gait is unstable pt denies pain

## 2022-08-09 ENCOUNTER — RX RENEWAL (OUTPATIENT)
Age: 82
End: 2022-08-09

## 2022-09-08 ENCOUNTER — NON-APPOINTMENT (OUTPATIENT)
Age: 82
End: 2022-09-08

## 2022-09-09 ENCOUNTER — APPOINTMENT (OUTPATIENT)
Dept: CARDIOLOGY | Facility: CLINIC | Age: 82
End: 2022-09-09

## 2022-09-09 ENCOUNTER — NON-APPOINTMENT (OUTPATIENT)
Age: 82
End: 2022-09-09

## 2022-09-09 VITALS — SYSTOLIC BLOOD PRESSURE: 158 MMHG | HEART RATE: 78 BPM | DIASTOLIC BLOOD PRESSURE: 78 MMHG | OXYGEN SATURATION: 99 %

## 2022-09-09 PROCEDURE — 93000 ELECTROCARDIOGRAM COMPLETE: CPT

## 2022-09-09 PROCEDURE — 99214 OFFICE O/P EST MOD 30 MIN: CPT | Mod: 25

## 2022-09-11 NOTE — CARDIOLOGY SUMMARY
[___] : [unfilled] [LVEF ___%] : LVEF [unfilled]% [de-identified] : 3/11/22 normal/rhythm anterior vesicular block [de-identified] : 5/7/19 calcium score 2526 which is severely elevated

## 2022-09-11 NOTE — REASON FOR VISIT
[FreeTextEntry1] : Mati was seen by dermatology group Dr. Celaya a cardiac standpoint is stable and without incarnate complaints\par \par update 9/9/22\par Mati notes some dyspnea on exertion he broke out in a sweat while trying to help out in the garage he has exercise-induced symptoms which may represent angina

## 2022-09-11 NOTE — ASSESSMENT
[FreeTextEntry1] : We will discontinue aspirin as per new guidelines lipids are satisfactory although A1c is elevated\par \par addendum 9/9/22\par a pharmacologic nuclear stress test is requested in order to assess exertional symptoms which may represent angina in the setting of prior coronary disease

## 2022-09-20 ENCOUNTER — APPOINTMENT (OUTPATIENT)
Dept: CARDIOLOGY | Facility: CLINIC | Age: 82
End: 2022-09-20

## 2022-09-20 PROCEDURE — 93015 CV STRESS TEST SUPVJ I&R: CPT

## 2022-09-20 PROCEDURE — A9500: CPT

## 2022-09-20 PROCEDURE — 78452 HT MUSCLE IMAGE SPECT MULT: CPT

## 2022-09-22 LAB — SARS-COV-2 N GENE NPH QL NAA+PROBE: NOT DETECTED

## 2022-09-23 ENCOUNTER — OUTPATIENT (OUTPATIENT)
Dept: OUTPATIENT SERVICES | Facility: HOSPITAL | Age: 82
LOS: 1 days | End: 2022-09-23
Payer: MEDICARE

## 2022-09-23 ENCOUNTER — TRANSCRIPTION ENCOUNTER (OUTPATIENT)
Age: 82
End: 2022-09-23

## 2022-09-23 VITALS
TEMPERATURE: 98 F | DIASTOLIC BLOOD PRESSURE: 67 MMHG | HEIGHT: 65 IN | HEART RATE: 63 BPM | SYSTOLIC BLOOD PRESSURE: 124 MMHG | RESPIRATION RATE: 18 BRPM | WEIGHT: 149.91 LBS | OXYGEN SATURATION: 98 %

## 2022-09-23 VITALS
OXYGEN SATURATION: 97 % | RESPIRATION RATE: 17 BRPM | HEART RATE: 70 BPM | SYSTOLIC BLOOD PRESSURE: 145 MMHG | DIASTOLIC BLOOD PRESSURE: 77 MMHG

## 2022-09-23 DIAGNOSIS — Z98.89 OTHER SPECIFIED POSTPROCEDURAL STATES: Chronic | ICD-10-CM

## 2022-09-23 DIAGNOSIS — I25.10 ATHEROSCLEROTIC HEART DISEASE OF NATIVE CORONARY ARTERY WITHOUT ANGINA PECTORIS: ICD-10-CM

## 2022-09-23 LAB
ANION GAP SERPL CALC-SCNC: 10 MMOL/L — SIGNIFICANT CHANGE UP (ref 5–17)
BUN SERPL-MCNC: 31 MG/DL — HIGH (ref 7–23)
CALCIUM SERPL-MCNC: 9.4 MG/DL — SIGNIFICANT CHANGE UP (ref 8.4–10.5)
CHLORIDE SERPL-SCNC: 104 MMOL/L — SIGNIFICANT CHANGE UP (ref 96–108)
CO2 SERPL-SCNC: 24 MMOL/L — SIGNIFICANT CHANGE UP (ref 22–31)
CREAT SERPL-MCNC: 1.12 MG/DL — SIGNIFICANT CHANGE UP (ref 0.5–1.3)
EGFR: 66 ML/MIN/1.73M2 — SIGNIFICANT CHANGE UP
GLUCOSE SERPL-MCNC: 104 MG/DL — HIGH (ref 70–99)
HCT VFR BLD CALC: 39.9 % — SIGNIFICANT CHANGE UP (ref 39–50)
HGB BLD-MCNC: 13.4 G/DL — SIGNIFICANT CHANGE UP (ref 13–17)
MCHC RBC-ENTMCNC: 33.6 GM/DL — SIGNIFICANT CHANGE UP (ref 32–36)
MCHC RBC-ENTMCNC: 34.5 PG — HIGH (ref 27–34)
MCV RBC AUTO: 102.8 FL — HIGH (ref 80–100)
NRBC # BLD: 0 /100 WBCS — SIGNIFICANT CHANGE UP (ref 0–0)
PLATELET # BLD AUTO: 187 K/UL — SIGNIFICANT CHANGE UP (ref 150–400)
POTASSIUM SERPL-MCNC: 5 MMOL/L — SIGNIFICANT CHANGE UP (ref 3.5–5.3)
POTASSIUM SERPL-SCNC: 5 MMOL/L — SIGNIFICANT CHANGE UP (ref 3.5–5.3)
RBC # BLD: 3.88 M/UL — LOW (ref 4.2–5.8)
RBC # FLD: 13.9 % — SIGNIFICANT CHANGE UP (ref 10.3–14.5)
SODIUM SERPL-SCNC: 138 MMOL/L — SIGNIFICANT CHANGE UP (ref 135–145)
WBC # BLD: 5.31 K/UL — SIGNIFICANT CHANGE UP (ref 3.8–10.5)
WBC # FLD AUTO: 5.31 K/UL — SIGNIFICANT CHANGE UP (ref 3.8–10.5)

## 2022-09-23 PROCEDURE — 80048 BASIC METABOLIC PNL TOTAL CA: CPT

## 2022-09-23 PROCEDURE — 36415 COLL VENOUS BLD VENIPUNCTURE: CPT

## 2022-09-23 PROCEDURE — 99152 MOD SED SAME PHYS/QHP 5/>YRS: CPT

## 2022-09-23 PROCEDURE — 85027 COMPLETE CBC AUTOMATED: CPT

## 2022-09-23 PROCEDURE — 93454 CORONARY ARTERY ANGIO S&I: CPT | Mod: 26

## 2022-09-23 PROCEDURE — 93010 ELECTROCARDIOGRAM REPORT: CPT

## 2022-09-23 PROCEDURE — 93454 CORONARY ARTERY ANGIO S&I: CPT

## 2022-09-23 PROCEDURE — C1894: CPT

## 2022-09-23 PROCEDURE — 93005 ELECTROCARDIOGRAM TRACING: CPT

## 2022-09-23 PROCEDURE — C1887: CPT

## 2022-09-23 PROCEDURE — C1769: CPT

## 2022-09-23 RX ORDER — FUROSEMIDE 40 MG
1 TABLET ORAL
Qty: 0 | Refills: 0 | DISCHARGE

## 2022-09-23 RX ORDER — CHOLECALCIFEROL (VITAMIN D3) 125 MCG
1 CAPSULE ORAL
Qty: 0 | Refills: 0 | DISCHARGE

## 2022-09-23 RX ORDER — PREGABALIN 225 MG/1
1 CAPSULE ORAL
Qty: 0 | Refills: 0 | DISCHARGE

## 2022-09-23 NOTE — ASU DISCHARGE PLAN (ADULT/PEDIATRIC) - NS MD DC FALL RISK RISK
For information on Fall & Injury Prevention, visit: [email or web address] OR  [email or web address] OR  https://www.cdc.gov/steadi/patient.html

## 2022-09-23 NOTE — H&P CARDIOLOGY - NSICDXPASTSURGICALHX_GEN_ALL_CORE_FT
PAST SURGICAL HISTORY:  H/O: hemorrhoidectomy     History of arthroscopy of left knee     History of laminectomy     History of tonsillectomy

## 2022-09-23 NOTE — H&P CARDIOLOGY - NSICDXPASTMEDICALHX_GEN_ALL_CORE_FT
PAST MEDICAL HISTORY:  CHF (congestive heart failure)     DVT (deep venous thrombosis)     Dyslipidemia     Lumbar stenosis     Mild cognitive impairment     Type 2 diabetes mellitus

## 2022-09-23 NOTE — ASU DISCHARGE PLAN (ADULT/PEDIATRIC) - ACTIVITY LEVEL
Jacky Moss is a 56 No excercise/No heavy lifting/No tub baths

## 2022-09-23 NOTE — H&P CARDIOLOGY - HISTORY OF PRESENT ILLNESS
This is an 81 y/o male with PMHx of HLD, HLD, DM type 2,Diet controlled, DVT in 2013 not on AC, mild cognitive impairment (spouse provided medical information), moderately calcified LM on cath 2019, carotid stenosis, CHF, PAD, spinal stenosis who presented to cardiology, Dr. Fabian, for evaluation of SOB/CHAVEZ and exertional chest discomfort. Denies dizziness, diaphoresis, palpitations, nausea, vomiting, peripheral edema, recent weight gain, or syncope.  No implanted monitoring devices. Pt. presents for further evaluation and cardiac cath.

## 2022-09-23 NOTE — ASU PATIENT PROFILE, ADULT - FALL HARM RISK - HARM RISK INTERVENTIONS

## 2022-09-23 NOTE — ASU DISCHARGE PLAN (ADULT/PEDIATRIC) - ASU DC SPECIAL INSTRUCTIONSFT
Wound Care:   the day AFTER your procedure remove bandage GENTLY, and clean using  mild soap and gentle warm, water stream, pat dry. leave OPEN to air. YOU MAY SHOWER   DO NOT apply lotions, creams, ointments, powder, perfumes to your incision site  DO NOT SOAK your site for 1 week ( no baths, no pools, no tubs, etc...)  Check  your groin and /or wrist daily.A small amount of bruising, and soarness are normal    ACTIVITY: for 24 hours   - DO NOT DRIVE  - DO NOT make any important decisions or sign legal documents   - DO NOT operate heavy machineries   - you may resume sexual activity in 48 hours, unless otherwise instructed by your cardiologist     If your procedure was done through the WRIST: for the NEXT 3DAYS:  - avoid pushing, pulling, with that affected wrist   - avoid repeated movement of that hand and wrist ( eg: typing, hammering)  - DO NOT LIFT anything more than 5 lbs     If your procedure was done through the GROIN: for the NEXT 5 DAYS  - Limit climbing stairs, DO NOT soak in bathtub or pool  - no strenuous activities, pushing, pulling, straining  - Do not lift anything 10lbs or heavier     MEDICATION:   take your medications as explained ( see discharge paperwork)   If you received a STENT, you will be taking antiplatelet medications to KEEP YOUR STENT OPEN ( eg: Aspirin, Plavix, Brilinta, Effient, etc).  Take as prescribed DO NOT STOP taking them without consulting with your cardiologist first.     Follow heart healthy diet recommended by your doctor, , if you smoke STOP SMOKING ( may call [phone] for center of tobacco control if you need assistance)     CALL your doctor to make appointment in 2 WEEKS     ***CALL YOUR DOCTOR***  if you experience: fever, chills, body aches, or severe pain, swelling, redness, heat or yellow discharge at incision site  If you experience Bleeding or excruciating pain at the procedural site, swelling ( golf ball size) at your procedural site  If you experience CHEST PAIN  If you experience extremity numbness, tingling, temperature change ( of your procedural site)   If you are unable to reach your doctor, you may contact:   -Cardiology Office at [facility] at [phone] or   - [facility] [phone]  - [facility] [phone]

## 2022-09-23 NOTE — ASU DISCHARGE PLAN (ADULT/PEDIATRIC) - CARE PROVIDER_API CALL
Alessio Fabian)  Cardiology; Internal Medicine  [address], [address]  [place] [address]  Phone: [phone]  Fax: [phone]  Follow Up Time:

## 2022-09-26 RX ORDER — LABETALOL HYDROCHLORIDE 100 MG/1
100 TABLET, FILM COATED ORAL TWICE DAILY
Qty: 180 | Refills: 1 | Status: DISCONTINUED | COMMUNITY
Start: 2019-09-11 | End: 2022-09-26

## 2022-09-27 PROBLEM — I50.9 HEART FAILURE, UNSPECIFIED: Chronic | Status: ACTIVE | Noted: 2022-09-23

## 2022-09-27 PROBLEM — G31.84 MILD COGNITIVE IMPAIRMENT OF UNCERTAIN OR UNKNOWN ETIOLOGY: Chronic | Status: ACTIVE | Noted: 2022-09-23

## 2022-10-05 ENCOUNTER — RX RENEWAL (OUTPATIENT)
Age: 82
End: 2022-10-05

## 2022-10-06 ENCOUNTER — APPOINTMENT (OUTPATIENT)
Dept: CARDIOLOGY | Facility: CLINIC | Age: 82
End: 2022-10-06

## 2022-10-06 PROCEDURE — 93306 TTE W/DOPPLER COMPLETE: CPT

## 2022-10-19 LAB
ALBUMIN SERPL ELPH-MCNC: 3.9 G/DL
ANION GAP SERPL CALC-SCNC: 12 MMOL/L
BUN SERPL-MCNC: 34 MG/DL
CALCIUM SERPL-MCNC: 9.5 MG/DL
CHLORIDE SERPL-SCNC: 107 MMOL/L
CO2 SERPL-SCNC: 21 MMOL/L
CREAT SERPL-MCNC: 1.25 MG/DL
EGFR: 57 ML/MIN/1.73M2
GLUCOSE SERPL-MCNC: 208 MG/DL
PHOSPHATE SERPL-MCNC: 3.5 MG/DL
POTASSIUM SERPL-SCNC: 5.4 MMOL/L
SODIUM SERPL-SCNC: 140 MMOL/L

## 2022-11-11 LAB
ALBUMIN SERPL ELPH-MCNC: 3.8 G/DL
ANION GAP SERPL CALC-SCNC: 12 MMOL/L
BUN SERPL-MCNC: 37 MG/DL
CALCIUM SERPL-MCNC: 9.4 MG/DL
CHLORIDE SERPL-SCNC: 104 MMOL/L
CO2 SERPL-SCNC: 23 MMOL/L
CREAT SERPL-MCNC: 1.45 MG/DL
EGFR: 48 ML/MIN/1.73M2
GLUCOSE SERPL-MCNC: 106 MG/DL
PHOSPHATE SERPL-MCNC: 4 MG/DL
POTASSIUM SERPL-SCNC: 5.3 MMOL/L
SODIUM SERPL-SCNC: 139 MMOL/L

## 2022-11-14 ENCOUNTER — APPOINTMENT (OUTPATIENT)
Dept: FAMILY MEDICINE | Facility: CLINIC | Age: 82
End: 2022-11-14

## 2022-11-14 VITALS
WEIGHT: 147 LBS | DIASTOLIC BLOOD PRESSURE: 71 MMHG | SYSTOLIC BLOOD PRESSURE: 127 MMHG | OXYGEN SATURATION: 99 % | RESPIRATION RATE: 16 BRPM | TEMPERATURE: 96.9 F | HEART RATE: 64 BPM | BODY MASS INDEX: 23.73 KG/M2

## 2022-11-14 DIAGNOSIS — Z87.19 PERSONAL HISTORY OF OTHER DISEASES OF THE DIGESTIVE SYSTEM: ICD-10-CM

## 2022-11-14 DIAGNOSIS — I47.1 SUPRAVENTRICULAR TACHYCARDIA: ICD-10-CM

## 2022-11-14 PROCEDURE — G0439: CPT

## 2022-11-14 PROCEDURE — 90662 IIV NO PRSV INCREASED AG IM: CPT

## 2022-11-14 PROCEDURE — G0008: CPT

## 2022-11-16 ENCOUNTER — APPOINTMENT (OUTPATIENT)
Dept: CARDIOLOGY | Facility: CLINIC | Age: 82
End: 2022-11-16

## 2022-11-16 ENCOUNTER — NON-APPOINTMENT (OUTPATIENT)
Age: 82
End: 2022-11-16

## 2022-11-16 VITALS
HEIGHT: 66 IN | RESPIRATION RATE: 16 BRPM | SYSTOLIC BLOOD PRESSURE: 133 MMHG | WEIGHT: 152 LBS | HEART RATE: 65 BPM | TEMPERATURE: 98 F | OXYGEN SATURATION: 98 % | DIASTOLIC BLOOD PRESSURE: 69 MMHG | BODY MASS INDEX: 24.43 KG/M2

## 2022-11-16 PROBLEM — Z87.19 HISTORY OF DYSPHAGIA: Status: RESOLVED | Noted: 2020-05-12 | Resolved: 2022-11-16

## 2022-11-16 PROCEDURE — 93000 ELECTROCARDIOGRAM COMPLETE: CPT

## 2022-11-16 PROCEDURE — 99214 OFFICE O/P EST MOD 30 MIN: CPT | Mod: 25

## 2022-11-16 RX ORDER — ATORVASTATIN CALCIUM 20 MG/1
20 TABLET, FILM COATED ORAL
Qty: 45 | Refills: 3 | Status: DISCONTINUED | COMMUNITY
Start: 2021-01-15 | End: 2022-11-16

## 2022-11-16 RX ORDER — FUROSEMIDE 20 MG/1
20 TABLET ORAL
Qty: 36 | Refills: 2 | Status: DISCONTINUED | COMMUNITY
Start: 2019-12-18 | End: 2022-11-16

## 2022-11-16 NOTE — HISTORY OF PRESENT ILLNESS
[Family Member] : family member [Other: _____] : [unfilled] [de-identified] : 82 y//o PMHX DM2 HTN HLD CHTF CAD, Mild impaired cognition

## 2022-11-16 NOTE — HEALTH RISK ASSESSMENT
[Very Good] : ~his/her~  mood as very good [No] : No [0] : 2) Feeling down, depressed, or hopeless: Not at all (0) [HIV test declined] : HIV test declined [Hepatitis C test declined] : Hepatitis C test declined [Change in mental status noted] : Change in mental status noted [With Family] : lives with family [Retired] : retired [High School] : high school [] :  [Fully functional (bathing, dressing, toileting, transferring, walking, feeding)] : Fully functional (bathing, dressing, toileting, transferring, walking, feeding) [Independent] : using telephone [Some assistance needed] : shopping [Full assistance needed] : managing finances [Reports changes in hearing] : Reports changes in hearing [Reviewed no changes] : Reviewed, no changes [I will adhere to the patient's wishes.] : I will adhere to the patient's wishes. [FreeTextEntry1] : none [Reports changes in dental health] : Reports no changes in dental health [de-identified] : per daughter very forgetful [de-identified] : wife and daughter [de-identified] : PEDRO LUIS refuses to wear hearing aids [de-identified] : partial dentures

## 2022-11-16 NOTE — CARDIOLOGY SUMMARY
[___] : [unfilled] [LVEF ___%] : LVEF [unfilled]% [de-identified] : 3/11/22 normal/rhythm anterior vesicular block [de-identified] : 5/7/19 calcium score 2526 which is severely elevated

## 2022-11-16 NOTE — ASSESSMENT
[FreeTextEntry1] : We will discontinue aspirin as per new guidelines lipids are satisfactory although A1c is elevated\par \par addendum 9/9/22\par a pharmacologic nuclear stress test is requested in order to assess exertional symptoms which may represent angina in the setting of prior coronary disease\par \par Addendum 11/16/2022 \par make atorva 10 daily dc furosemide elevated creat borderline potassium repeat blood 1 week \par \par Medical necessity\par This is a high risk encounter based upon 2 or more chronic illnesses along with drug evaluation and management\par \par \par discussed with Love at the bedside is quite supportive.  And Ainsley his wife\par \par

## 2022-11-16 NOTE — PHYSICAL EXAM
[No Acute Distress] : no acute distress [Well-Appearing] : well-appearing [Normal Sclera/Conjunctiva] : normal sclera/conjunctiva [PERRL] : pupils equal round and reactive to light [EOMI] : extraocular movements intact [Normal Oropharynx] : the oropharynx was normal [Normal TMs] : both tympanic membranes were normal [No JVD] : no jugular venous distention [Regular Rhythm] : with a regular rhythm [Normal S1, S2] : normal S1 and S2 [No Joint Swelling] : no joint swelling [No Rash] : no rash [Normal] : normal gait, coordination grossly intact, no focal deficits and deep tendon reflexes were 2+ and symmetric [de-identified] : uses roller walker to ambulate [de-identified] : dentrues in place [de-identified] : wears bracjes both feel drop feet

## 2022-11-16 NOTE — REASON FOR VISIT
[FreeTextEntry1] : Mati was seen by dermatology group Dr. Celaya a cardiac standpoint is stable and without incarnate complaints\par \par update 9/9/22\par Mati notes some dyspnea on exertion he broke out in a sweat while trying to help out in the garage he has exercise-induced symptoms which may represent angina\par \par Update 11/16/2022\par Mati is doing well on this current regimen however we note a slow but steady rise in creatinine the potassium is also at the upper limits of normal .a repeat renal is requested in 1 week time and furosemide is discontinued given euvolemic status and rising creatinine

## 2022-11-27 LAB
ALBUMIN SERPL ELPH-MCNC: 3.8 G/DL
ANION GAP SERPL CALC-SCNC: 13 MMOL/L
BUN SERPL-MCNC: 39 MG/DL
CALCIUM SERPL-MCNC: 9.3 MG/DL
CHLORIDE SERPL-SCNC: 107 MMOL/L
CO2 SERPL-SCNC: 21 MMOL/L
CREAT SERPL-MCNC: 1.42 MG/DL
EGFR: 49 ML/MIN/1.73M2
GLUCOSE SERPL-MCNC: 177 MG/DL
PHOSPHATE SERPL-MCNC: 3.5 MG/DL
POTASSIUM SERPL-SCNC: 5.5 MMOL/L
SODIUM SERPL-SCNC: 141 MMOL/L

## 2022-11-27 RX ORDER — SPIRONOLACTONE 25 MG/1
25 TABLET ORAL
Qty: 90 | Refills: 1 | Status: DISCONTINUED | COMMUNITY
Start: 2022-09-26 | End: 2022-11-27

## 2022-11-28 ENCOUNTER — NON-APPOINTMENT (OUTPATIENT)
Age: 82
End: 2022-11-28

## 2022-12-07 ENCOUNTER — EMERGENCY (EMERGENCY)
Facility: HOSPITAL | Age: 82
LOS: 1 days | Discharge: HOME | End: 2022-12-07
Attending: INTERNAL MEDICINE | Admitting: INTERNAL MEDICINE
Payer: MEDICARE

## 2022-12-07 VITALS
RESPIRATION RATE: 18 BRPM | TEMPERATURE: 98 F | DIASTOLIC BLOOD PRESSURE: 74 MMHG | SYSTOLIC BLOOD PRESSURE: 153 MMHG | WEIGHT: 138.01 LBS | HEART RATE: 79 BPM | HEIGHT: 68 IN

## 2022-12-07 DIAGNOSIS — Z98.89 OTHER SPECIFIED POSTPROCEDURAL STATES: Chronic | ICD-10-CM

## 2022-12-07 PROCEDURE — 99284 EMERGENCY DEPT VISIT MOD MDM: CPT | Mod: 25

## 2022-12-07 PROCEDURE — 70450 CT HEAD/BRAIN W/O DYE: CPT | Mod: 26,MA

## 2022-12-07 PROCEDURE — 93005 ELECTROCARDIOGRAM TRACING: CPT

## 2022-12-07 PROCEDURE — 70450 CT HEAD/BRAIN W/O DYE: CPT | Mod: MA

## 2022-12-07 PROCEDURE — 12001 RPR S/N/AX/GEN/TRNK 2.5CM/<: CPT

## 2022-12-07 PROCEDURE — 90471 IMMUNIZATION ADMIN: CPT

## 2022-12-07 PROCEDURE — 90715 TDAP VACCINE 7 YRS/> IM: CPT

## 2022-12-07 PROCEDURE — 93010 ELECTROCARDIOGRAM REPORT: CPT

## 2022-12-07 RX ORDER — FUROSEMIDE 40 MG
1 TABLET ORAL
Qty: 0 | Refills: 0 | DISCHARGE

## 2022-12-07 RX ORDER — ATORVASTATIN CALCIUM 80 MG/1
1 TABLET, FILM COATED ORAL
Qty: 0 | Refills: 0 | DISCHARGE

## 2022-12-07 RX ORDER — LABETALOL HCL 100 MG
0.5 TABLET ORAL
Qty: 0 | Refills: 0 | DISCHARGE

## 2022-12-07 RX ORDER — DONEPEZIL HYDROCHLORIDE 10 MG/1
1 TABLET, FILM COATED ORAL
Qty: 0 | Refills: 0 | DISCHARGE

## 2022-12-07 RX ORDER — LISINOPRIL 2.5 MG/1
1 TABLET ORAL
Qty: 0 | Refills: 0 | DISCHARGE

## 2022-12-07 RX ORDER — TETANUS TOXOID, REDUCED DIPHTHERIA TOXOID AND ACELLULAR PERTUSSIS VACCINE, ADSORBED 5; 2.5; 8; 8; 2.5 [IU]/.5ML; [IU]/.5ML; UG/.5ML; UG/.5ML; UG/.5ML
0.5 SUSPENSION INTRAMUSCULAR ONCE
Refills: 0 | Status: COMPLETED | OUTPATIENT
Start: 2022-12-07 | End: 2022-12-07

## 2022-12-07 RX ORDER — ALLOPURINOL 300 MG
0 TABLET ORAL
Qty: 0 | Refills: 0 | DISCHARGE

## 2022-12-07 RX ADMIN — TETANUS TOXOID, REDUCED DIPHTHERIA TOXOID AND ACELLULAR PERTUSSIS VACCINE, ADSORBED 0.5 MILLILITER(S): 5; 2.5; 8; 8; 2.5 SUSPENSION INTRAMUSCULAR at 16:03

## 2022-12-07 NOTE — ED PROVIDER NOTE - CLINICAL SUMMARY MEDICAL DECISION MAKING FREE TEXT BOX
82-year-old male with a past medical history of coronary dementia hypertension dyslipidemia no anticoagulation plan forward and lost balance and fell down and hit his head and has some congestion on the scalp and skin avulsion in the left elbow no change in his mental status no other changes reported by the family members his mental status is as usual he has short-term memory impairment for which he is on donezepil  lac L elbow closed with sterristrips 82-year-old male with a past medical history of coronary dementia hypertension dyslipidemia no anticoagulation plan forward and lost balance and fell down and hit his head and has some congestion on the scalp and skin avulsion in the left elbow no change in his mental status no other changes reported by the family members his mental status is as usual he has short-term memory impairment for which he is on donezepil  lac L elbow closed with sterristrips  ct brain neg

## 2022-12-07 NOTE — ED PROVIDER NOTE - PHYSICAL EXAMINATION
General:     NAD, well-nourished, well-appearing  Head:     NC/ ecchymosis on scalp , EOMI, oral mucosa moist  Neck:     trachea midline  Lungs:     CTA b/l, no w/r/r  CVS:     S1S2, RRR, no m/g/r  Abd:     +BS, s/nt/nd, no organomegaly  Ext:    2+ radial and pedal pulses, no c/c/e 2 cm skin avulsion L elbow   Neuro: AAOx3, no sensory/motor deficits

## 2022-12-07 NOTE — ED ADULT TRIAGE NOTE - BP NONINVASIVE SYSTOLIC (MM HG)
Reason for Disposition    Requesting regular office appointment     Daughter calling for information regarding making an appt for Rosina with [facility] or  locations. Gave  locations. We do not schedule for [facility].    Additional Information    Negative: [1] Caller is not with the adult (patient) AND [2] reporting urgent symptoms    Negative: Lab result questions    Negative: Medication questions    Negative: Caller cannot be reached by phone    Negative: Caller has already spoken to PCP or another triager    Negative: RN needs further essential information from caller in order to complete triage    Negative: [1] Caller requesting NON-URGENT health information AND [2] PCP's office is the best resource    Protocols used: INFORMATION ONLY CALL-ADULT-     153

## 2022-12-07 NOTE — ED ADULT NURSE NOTE - PRIMARY CARE PROVIDER
Colposcopy    Date of procedure:  7/23/2020   Risks and benefits discussed? yes   All questions answered? yes   Consents given by: patient   Written consent obtained? 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  The cervix was next bathed in acetic acid.   The findings were as follows:    Transformation zone seen? Yes   Findings: 1. Acetowhite noted at 1 o'clock   Ectocervical biopsies: taken from 1 o'clock.  Monsels solution was applied to the biopsy sites.   Endocervical curettage: performed               Colposcopic Impression: 1. Mild dysplasia  2. Adequate colposcopy  3. Colposcopic findings are consistent with cytology       Plan: Will base further treatment on pathology results  Post biopsy instructions given to patient.  Specimens labelled and sent to pathology.         This note was electronically signed.    Lucy Hawley, DO  July 23, 2020  
unknown

## 2022-12-07 NOTE — ED ADULT TRIAGE NOTE - CHIEF COMPLAINT QUOTE
Pt from home, c/o head injury hit head on a metal door, skin tear left elbow, possibly fell was found on floor by wife

## 2022-12-07 NOTE — ED PROVIDER NOTE - PATIENT PORTAL LINK FT
You can access the FollowMyHealth Patient Portal offered by [facility] by registering at the following website: [email or web address]. By joining [employer]’s FollowMyHealth portal, you will also be able to view your health information using other applications (apps) compatible with our system.

## 2022-12-07 NOTE — ED PROVIDER NOTE - OBJECTIVE STATEMENT
82-year-old male with a past medical history of coronary dementia hypertension dyslipidemia no anticoagulation plan forward and lost balance and fell down and hit his head and has some congestion on the scalp and skin avulsion in the left elbow no change in his mental status no other changes reported by the family members his mental status is as usual he has short-term memory impairment for which he is on donezepil

## 2022-12-07 NOTE — ED ADULT NURSE NOTE - ALCOHOL PRE SCREEN (AUDIT - C)
Thanks for coming today.  [facility]  [address] [place] [address]    To schedule future appointments in Ortho Clinic, you may call [phone].    To schedule ordered imaging by your provider:   Call [facility] [phone]    To schedule an injection ordered by your provider:  Call [facility] Injection scheduling line: [phone]  [facility] available online at:  [email or web address]    Please call if any further questions or concerns ([phone]).  Clinic hours 8 am to 5 pm.    Return to clinic (call) if symptoms worsen or fail to improve.     Statement Selected

## 2022-12-07 NOTE — ED ADULT NURSE NOTE - NSFALLRSKASSESSTYPE_ED_ALL_ED
Patients grandmother LVM stating that patient has never \"wet\" himself before but has had an accident at school last week and this week. Wanting to know if it has to do with any of his medications? His nebulizer medication? Please advise. Initial (On Arrival)

## 2022-12-08 LAB
ALBUMIN SERPL ELPH-MCNC: 4.1 G/DL
ANION GAP SERPL CALC-SCNC: 10 MMOL/L
BUN SERPL-MCNC: 29 MG/DL
CALCIUM SERPL-MCNC: 9.7 MG/DL
CHLORIDE SERPL-SCNC: 106 MMOL/L
CO2 SERPL-SCNC: 24 MMOL/L
CREAT SERPL-MCNC: 1.35 MG/DL
EGFR: 52 ML/MIN/1.73M2
GLUCOSE SERPL-MCNC: 105 MG/DL
PHOSPHATE SERPL-MCNC: 3.5 MG/DL
POTASSIUM SERPL-SCNC: 4.9 MMOL/L
SODIUM SERPL-SCNC: 139 MMOL/L

## 2022-12-18 NOTE — CHART NOTE - NSCHARTNOTEFT_GEN_A_CORE
SW called pt to discuss and assist with follow up care.  Pt is an 83 y/o male presented to ED for head injury/fall.  As per [facility], pt has scheduled Neurology clinic appt with Chuck Baum on 1/9/23.

## 2022-12-22 ENCOUNTER — NON-APPOINTMENT (OUTPATIENT)
Age: 82
End: 2022-12-22

## 2022-12-23 ENCOUNTER — RX RENEWAL (OUTPATIENT)
Age: 82
End: 2022-12-23

## 2023-01-09 ENCOUNTER — APPOINTMENT (OUTPATIENT)
Dept: NEUROLOGY | Facility: CLINIC | Age: 83
End: 2023-01-09
Payer: MEDICARE

## 2023-01-09 DIAGNOSIS — M48.00 SPINAL STENOSIS, SITE UNSPECIFIED: ICD-10-CM

## 2023-01-09 DIAGNOSIS — G31.84 MILD COGNITIVE IMPAIRMENT, SO STATED: ICD-10-CM

## 2023-01-09 PROCEDURE — 99214 OFFICE O/P EST MOD 30 MIN: CPT | Mod: 95

## 2023-01-09 RX ORDER — LISINOPRIL 5 MG/1
5 TABLET ORAL DAILY
Qty: 90 | Refills: 1 | Status: DISCONTINUED | COMMUNITY
Start: 2022-09-26 | End: 2023-01-09

## 2023-01-09 NOTE — DATA REVIEWED
[de-identified] : EXAM: MR BRAIN \par \par \par PROCEDURE DATE: 12/05/2018 \par \par \par \par INTERPRETATION: CLINICAL STATEMENT: Increased memory loss \par \par TECHNIQUE: MRI of the brain was performed without gadolinium. \par \par COMPARISON: None. \par \par FINDINGS: \par There is moderate diffuse parenchymal volume loss. There are T2 prolongation \par signal abnormalities in the periventricular subcortical white matter likely \par related to mild chronic microvascular ischemic changes. \par \par There is no acute parenchymal hemorrhage, parenchymal mass, mass effect or \par midline shift. There is no extra-axial fluid collection. There is no \par hydrocephalus. There is no acute infarct. Partial empty sella \par \par Loss of normal flow void in the intradural segment of distal right vertebral \par artery noted. \par \par Mucosal thickening paranasal sinuses \par \par IMPRESSION: \par No acute intracranial hemorrhage or acute infarct. \par \par Loss of normal flow void in the intradural segment of distal right vertebral \par artery which may be related to occlusion/high-grade stenosis. \par \par \par \par \par \par \par \par \par \par ERYN GUPTA M.D., ATTENDING RADIOLOGIST \par This document has been electronically signed. Dec 5 2018 2:33PM \par  [de-identified] : See chart. [de-identified] : Labs done, all wnl.

## 2023-01-09 NOTE — PHYSICAL EXAM
[General Appearance - Alert] : alert [General Appearance - In No Acute Distress] : in no acute distress [Oriented To Time, Place, And Person] : oriented to person, place, and time [Impaired Insight] : insight and judgment were intact [Affect] : the affect was normal [Person] : oriented to person [Place] : oriented to place [Registration Intact] : recent registration memory intact [Concentration Intact] : normal concentrating ability [Visual Intact] : visual attention was ~T not ~L decreased [Naming Objects] : no difficulty naming common objects [Repeating Phrases] : no difficulty repeating a phrase [Writing A Sentence] : no difficulty writing a sentence [Fluency] : fluency intact [Comprehension] : comprehension intact [Reading] : reading intact [Past History] : adequate knowledge of personal past history [Total Score ___ / 30] : the patient achieved a score of [unfilled] /30 [Date / Time ___ / 5] : date / time [unfilled] / 5 [Place ___ / 5] : place [unfilled] / 5 [Registration ___ / 3] : registration [unfilled] / 3 [Serial Sevens ___/5] : serial sevens [unfilled] / 5 [Naming 2 Objects ___ / 2] : naming two objects [unfilled] / 2 [Repeating a Sentence ___ / 1] : repeating a sentence [unfilled] / 1 [Writing a Sentence ___ / 1] : write sentence [unfilled] / 1 [3-stage Verbal Command ___ / 3] : three-stage verbal command [unfilled] / 3 [Written Command ___ / 1] : written command [unfilled] / 1 [Copy a Design ___ / 1] : copy a design [unfilled] / 1 [Recall ___ / 3] : recall [unfilled] / 3 [Cranial Nerves Optic (II)] : visual acuity intact bilaterally,  visual fields full to confrontation, pupils equal round and reactive to light [Cranial Nerves Oculomotor (III)] : extraocular motion intact [Cranial Nerves Trigeminal (V)] : facial sensation intact symmetrically [Cranial Nerves Facial (VII)] : face symmetrical [Cranial Nerves Vestibulocochlear (VIII)] : hearing was intact bilaterally [Cranial Nerves Glossopharyngeal (IX)] : tongue and palate midline [Cranial Nerves Accessory (XI - Cranial And Spinal)] : head turning and shoulder shrug symmetric [Cranial Nerves Hypoglossal (XII)] : there was no tongue deviation with protrusion [Motor Strength] : muscle strength was normal in all four extremities [No Muscle Atrophy] : normal bulk in all four extremities [Motor Handedness Right-Handed] : the patient is right hand dominant [Sensation Tactile Decrease] : light touch was intact [Sensation Pain / Temperature Decrease] : pain and temperature was intact [Proprioception] : proprioception was intact [Romberg's Sign] : a positive Romberg's sign was present [Balance] : balance was intact [Limited Balance] : the patient's balance was impaired [1+] : Brachioradialis left 1+ [0] : Ankle jerk left 0 [Sclera] : the sclera and conjunctiva were normal [PERRL With Normal Accommodation] : pupils were equal in size, round, reactive to light, with normal accommodation [Extraocular Movements] : extraocular movements were intact [Optic Disc Abnormality] : the optic disc were normal in size and color [No APD] : no afferent pupillary defect [No HELGA] : no internuclear ophthalmoplegia [Full Visual Field] : full visual field [Outer Ear] : the ears and nose were normal in appearance [Oropharynx] : the oropharynx was normal [Neck Appearance] : the appearance of the neck was normal [Neck Cervical Mass (___cm)] : no neck mass was observed [Jugular Venous Distention Increased] : there was no jugular-venous distention [Thyroid Diffuse Enlargement] : the thyroid was not enlarged [Thyroid Nodule] : there were no palpable thyroid nodules [Auscultation Breath Sounds / Voice Sounds] : lungs were clear to auscultation bilaterally [Heart Rate And Rhythm] : heart rate was normal and rhythm regular [Heart Sounds] : normal S1 and S2 [Heart Sounds Gallop] : no gallops [Murmurs] : no murmurs [Heart Sounds Pericardial Friction Rub] : no pericardial rub [Arterial Pulses Carotid] : carotid pulses were normal with no bruits [Full Pulse] : the pedal pulses are present [Edema] : there was no peripheral edema [Bowel Sounds] : normal bowel sounds [Abdomen Soft] : soft [Abdomen Tenderness] : non-tender [Abdomen Mass (___ Cm)] : no abdominal mass palpated [No CVA Tenderness] : no ~M costovertebral angle tenderness [No Spinal Tenderness] : no spinal tenderness [Nail Clubbing] : no clubbing  or cyanosis of the fingernails [Involuntary Movements] : no involuntary movements were seen [Musculoskeletal - Swelling] : no joint swelling seen [Motor Tone] : muscle strength and tone were normal [Skin Color & Pigmentation] : normal skin color and pigmentation [Skin Turgor] : normal skin turgor [] : no rash [Time] : disoriented to time [Short Term Intact] : short term memory impaired [Span Intact] : the attention span was decreased [Current Events] : inadequate knowledge of current events [Motor Strength Upper Extremities Bilaterally] : strength was normal in both upper extremities [Motor Strength Lower Extremities Bilaterally] : strength was normal in both lower extremities [Allodynia] : no ~T allodynia present [Dysesthesia] : no dysesthesia [Hyperesthesia] : no hyperesthesia [Past-pointing] : there was no past-pointing [Tremor] : no tremor present [Plantar Reflex Right Only] : normal on the right [Plantar Reflex Left Only] : normal on the left [FreeTextEntry4] : Mental Status Exam\par Presidents: 0/5\par Alternating Pattern: ok\par Spiral: ok\par Clock: 1/3, numbers BW\par Repetition: ok\par R/L discrimination on self and examiner: ok\par Cross-line commands: ok\par Praxis: \par -Motor: ok\par -Dynamic/Luria: ok\par -Ideomotor/Imitation: ok \par -Ideational/writing/closing-in: ok\par -Dressing: ok. [FreeTextEntry6] : Foot drop bilat, uses braces [FreeTextEntry7] : loss of vib sensation in UE and LE distally [FreeTextEntry8] : walks with cane, stepping gait, cautioned [FreeTextEntry1] : bilat cataracts

## 2023-01-09 NOTE — HISTORY OF PRESENT ILLNESS
[Home] : at home, [unfilled] , at the time of the visit. [Medical Office: ([facility])___] : at the medical office located in  [FreeTextEntry1] : COVID VACCINATION FULL.\par NO COVID.\par \par HPI-Interval Hx [id number]:\par Pt at home with family. \par Recent AngioRx Heart and stress test.\par Come changes in meds, DC lasix and ASA81.\par \par Pt feels fine. \par Per family, he needs more reminders, for ADL and IADL.\par CDR now 1.0.\par \par Sleep and appetite are stable.\par His weight is 140lb at home now, which is lower than what measured in the office.\par \par Motor: per family he is stable, but not very active. One fall from turning too fast once after closing a closet.\par \par \par HPI-Interval Hx [id number]:\par Per pt and family her has been stable, eats and sleeps well.\par Tries to keep busy at home, but very limited.\par Somehow does not want to go out too much, due to COVID.\par \par \par \par \par HPI-Interval Hx [id number]:\par pt here with wife. \par Per pt he feels well, things are stable. \par Per wife, he has been stable.\par Weight and appetite ok.\par Sleep ok.\par Motor ok, though one recent night he tripped on something on the floor, mild fall, no consequences.\par Has started Allopurinol for Gout. \par \par \par HPI-Interval Hx [id number]:\par Taking Aricept at night, doing ok.\par Sleep ok.\par Appetite: ok.\par Motor: stable, limited by COVID, but no major changes. \par He has had a few episodes where he has thought something was taken from him, and in a few occasions he has been speaking about the past as if he was there, unclear if there were VH or AH. He denies.\par He has been speaking [place] with his brothers and cousins, from time to time.\par \par ADL: full, needs some reminders\par IADL: no driving anymore, since NPT (?card sorting?); wife has always done finances and bills and taxes; never good at PC; ok with TV and phone\par CDR: 0.5.\par \par \par HPI-interval hx [id number] RPA: AWT used.\par Has tried to change Aricept to the am, but got diarrhea, so taking it at night. \par Sleeps well.\par Appetite intact, no issues.\par Gait ok.\par \par \par HPI-interval hx [id number]:\par pt here with wife and daughter.\par He has been stable.\par NPT reviewed with Dr. Ewing, aMCI seems the case.\par He has been stable, maybe better, more attentive, makes extra efforts. \par More attentive to his hydration as well.\par ADL and IADL stable.\par Driving ok.\par Got CT heart and CoronaryAngioRx, so far ok. Still with some tachycardia. \par \par HPI: 79yo RH WM, with HTN, HLD, diet controlled DM II, ? CAD, here for report of forgetfulness. Radiculopathy in LE, with drop foot bilat, due to L4-L5 herniation (laminectomy in 2012).\par \par PMH: Pt seen by Dr. Levine recently.\par Per wife, he has shown, over the last 6-12 months, STM issues, forgetting recent events, upcoming appointments. No problems with names, locations, faces.\par No changes in behavior.\par Imaging and NPT done recently. 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 \par \par Motor symptoms: limited by LE radiculopathy, uses braces\par \par B/B: no issues, some polyuria. \par \par Psychiatric symptoms: no issues. \par \par ADL: intact\par IADL: full, drives, no issues\par CDR: 0.5.\par \par Professional status: retired, former .\par \par PCP and other physicians:\par -PCP: Carlito\par -Neuro: Abner.

## 2023-01-09 NOTE — ASSESSMENT
[FreeTextEntry1] : Assessment:\par 83yo RH WM, with vascular risk factors, PMH of laminectomy and residual foot drops, ataxic gait (pre-DM), here with concerns of memory problems.STM issues, mild. 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\par -ataxic gait/sensory neuropathy/radiculopathy\par \par Plan:\par -Aricept 10mg-continue; would continue with same dose, given tendency to bradycardia\par -Pt prefers to hold off trials\par -encourage activity and socialization.\par \par A thorough discussion was entertained with the patient/caregiver regarding the use of psychoactive medications, their possible benefits and AE profile, including the risk of cardiovascular complications.\par We discussed the benefits of being active, physically and mentally, and the need to to establish a routine in this respect.\par Driving abilities and firearms possession and use were discussed, in relation to progression of the cognitive decline, and the need to assess them periodically.\par Patient/caregiver understand and agree with the plan.\par

## 2023-01-15 ENCOUNTER — RX RENEWAL (OUTPATIENT)
Age: 83
End: 2023-01-15

## 2023-01-18 ENCOUNTER — NON-APPOINTMENT (OUTPATIENT)
Age: 83
End: 2023-01-18

## 2023-01-18 ENCOUNTER — APPOINTMENT (OUTPATIENT)
Dept: CARDIOLOGY | Facility: CLINIC | Age: 83
End: 2023-01-18
Payer: MEDICARE

## 2023-01-18 VITALS
HEIGHT: 66 IN | SYSTOLIC BLOOD PRESSURE: 140 MMHG | RESPIRATION RATE: 16 BRPM | OXYGEN SATURATION: 97 % | BODY MASS INDEX: 22.82 KG/M2 | DIASTOLIC BLOOD PRESSURE: 76 MMHG | WEIGHT: 142 LBS | TEMPERATURE: 97.5 F | HEART RATE: 74 BPM

## 2023-01-18 PROCEDURE — 99213 OFFICE O/P EST LOW 20 MIN: CPT | Mod: 25

## 2023-01-18 PROCEDURE — 93000 ELECTROCARDIOGRAM COMPLETE: CPT

## 2023-01-18 NOTE — REASON FOR VISIT
[FreeTextEntry1] : Mati was seen by dermatology group Dr. Celaya a cardiac standpoint is stable and without incarnate complaints\par \par update 9/9/22\par Mati notes some dyspnea on exertion he broke out in a sweat while trying to help out in the garage he has exercise-induced symptoms which may represent angina\par \par Update 11/16/2022\par Mati is doing well on this current regimen however we note a slow but steady rise in creatinine the potassium is also at the upper limits of normal .a repeat renal is requested in 1 week time and furosemide is discontinued given euvolemic status and rising creatinine\par \par Update 1/18/2023\par Michael is doing well we have tapered his formulary discussed with Love encouraging fluid resuscitation creatinine slowly improving we will recheck renal indices.  Avoid ACE inhibitor with symptoms and renal failure.  Currently on carvedilol and Farxiga with good response

## 2023-01-18 NOTE — ASSESSMENT
[FreeTextEntry1] : We will discontinue aspirin as per new guidelines lipids are satisfactory although A1c is elevated\par \par addendum 9/9/22\par a pharmacologic nuclear stress test is requested in order to assess exertional symptoms which may represent angina in the setting of prior coronary disease\par \par Addendum 11/16/2022 \par make atorva 10 daily dc furosemide elevated creat borderline potassium repeat blood 1 week \par \par Addendum\par No new cardiac recommendations continue fluid resuscitation I discussed with Love at bedside.\par \par Medical necessity\par This is a intermediate risk encounter based upon 2 or more chronic illnesses along with drug evaluation and management monitoring with renal profile\par \par \par discussed with Love at the bedside is quite supportive.  And Ainsley his wife\par \par

## 2023-01-18 NOTE — CARDIOLOGY SUMMARY
[___] : [unfilled] [LVEF ___%] : LVEF [unfilled]% [de-identified] : 3/11/22 normal/rhythm anterior vesicular block [de-identified] : 5/7/19 calcium score 2526 which is severely elevated

## 2023-01-25 ENCOUNTER — LABORATORY RESULT (OUTPATIENT)
Age: 83
End: 2023-01-25

## 2023-01-26 LAB
ALBUMIN SERPL ELPH-MCNC: 4 G/DL
ALP BLD-CCNC: 120 U/L
ALT SERPL-CCNC: 17 U/L
ANION GAP SERPL CALC-SCNC: 9 MMOL/L
AST SERPL-CCNC: 18 U/L
BASOPHILS # BLD AUTO: 0 K/UL
BASOPHILS NFR BLD AUTO: 0 %
BILIRUB SERPL-MCNC: 0.5 MG/DL
BUN SERPL-MCNC: 35 MG/DL
CALCIUM SERPL-MCNC: 9.7 MG/DL
CHLORIDE SERPL-SCNC: 107 MMOL/L
CHOLEST SERPL-MCNC: 138 MG/DL
CO2 SERPL-SCNC: 24 MMOL/L
CREAT SERPL-MCNC: 1.25 MG/DL
EGFR: 57 ML/MIN/1.73M2
EOSINOPHIL # BLD AUTO: 0.21 K/UL
EOSINOPHIL NFR BLD AUTO: 3.5 %
GLUCOSE SERPL-MCNC: 105 MG/DL
HCT VFR BLD CALC: 42.7 %
HDLC SERPL-MCNC: 48 MG/DL
HGB BLD-MCNC: 14 G/DL
LDLC SERPL CALC-MCNC: 72 MG/DL
LYMPHOCYTES # BLD AUTO: 1.7 K/UL
LYMPHOCYTES NFR BLD AUTO: 28.7 %
MAN DIFF?: NORMAL
MCHC RBC-ENTMCNC: 32.8 GM/DL
MCHC RBC-ENTMCNC: 34.7 PG
MCV RBC AUTO: 106 FL
MONOCYTES # BLD AUTO: 0.46 K/UL
MONOCYTES NFR BLD AUTO: 7.8 %
NEUTROPHILS # BLD AUTO: 3.55 K/UL
NEUTROPHILS NFR BLD AUTO: 60 %
NONHDLC SERPL-MCNC: 90 MG/DL
PLATELET # BLD AUTO: 166 K/UL
POTASSIUM SERPL-SCNC: 5.1 MMOL/L
PROT SERPL-MCNC: 6.1 G/DL
RBC # BLD: 4.03 M/UL
RBC # FLD: 14.3 %
SODIUM SERPL-SCNC: 140 MMOL/L
TRIGL SERPL-MCNC: 88 MG/DL
TSH SERPL-ACNC: 1.35 UIU/ML
WBC # FLD AUTO: 5.92 K/UL

## 2023-04-12 ENCOUNTER — NON-APPOINTMENT (OUTPATIENT)
Age: 83
End: 2023-04-12

## 2023-04-12 ENCOUNTER — APPOINTMENT (OUTPATIENT)
Dept: CARDIOLOGY | Facility: CLINIC | Age: 83
End: 2023-04-12
Payer: MEDICARE

## 2023-04-12 VITALS
HEIGHT: 66 IN | SYSTOLIC BLOOD PRESSURE: 134 MMHG | OXYGEN SATURATION: 95 % | BODY MASS INDEX: 22.82 KG/M2 | DIASTOLIC BLOOD PRESSURE: 68 MMHG | WEIGHT: 142 LBS | RESPIRATION RATE: 18 BRPM | HEART RATE: 86 BPM

## 2023-04-12 PROCEDURE — 93000 ELECTROCARDIOGRAM COMPLETE: CPT

## 2023-04-12 PROCEDURE — 99212 OFFICE O/P EST SF 10 MIN: CPT | Mod: 25

## 2023-05-01 ENCOUNTER — APPOINTMENT (OUTPATIENT)
Dept: NEUROLOGY | Facility: CLINIC | Age: 83
End: 2023-05-01
Payer: MEDICARE

## 2023-05-01 VITALS
SYSTOLIC BLOOD PRESSURE: 166 MMHG | WEIGHT: 148 LBS | HEART RATE: 71 BPM | DIASTOLIC BLOOD PRESSURE: 67 MMHG | BODY MASS INDEX: 23.89 KG/M2

## 2023-05-01 PROCEDURE — 99214 OFFICE O/P EST MOD 30 MIN: CPT

## 2023-05-01 NOTE — ASSESSMENT
[FreeTextEntry1] : Assessment:\par 81yo RH WM, with vascular risk factors, PMH of laminectomy and residual foot drops, ataxic gait (pre-DM), here with concerns of memory problems.STM issues, mild. 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 \par Overall, stable.\par HR has been ok, higher. \par BP stable after DC a few meds. \par Can consider increasing Aricept if HR is stable, will [place] back after a few weeks of monitoring with wearable. \par \par Impression:\par -probable AD\par -ataxic gait/sensory neuropathy/radiculopathy\par \par Plan:\par -Aricept 10mg-continue - willconsdier 15mg if HR stable\par -Pt prefers to hold off trials\par -encourage activity and socialization.

## 2023-05-01 NOTE — DATA REVIEWED
[de-identified] : EXAM: MR BRAIN \par \par \par PROCEDURE DATE: 12/05/2018 \par \par \par \par INTERPRETATION: CLINICAL STATEMENT: Increased memory loss \par \par TECHNIQUE: MRI of the brain was performed without gadolinium. \par \par COMPARISON: None. \par \par FINDINGS: \par There is moderate diffuse parenchymal volume loss. There are T2 prolongation \par signal abnormalities in the periventricular subcortical white matter likely \par related to mild chronic microvascular ischemic changes. \par \par There is no acute parenchymal hemorrhage, parenchymal mass, mass effect or \par midline shift. There is no extra-axial fluid collection. There is no \par hydrocephalus. There is no acute infarct. Partial empty sella \par \par Loss of normal flow void in the intradural segment of distal right vertebral \par artery noted. \par \par Mucosal thickening paranasal sinuses \par \par IMPRESSION: \par No acute intracranial hemorrhage or acute infarct. \par \par Loss of normal flow void in the intradural segment of distal right vertebral \par artery which may be related to occlusion/high-grade stenosis. \par \par \par \par \par \par \par \par \par \par ERYN GUPTA M.D., ATTENDING RADIOLOGIST \par This document has been electronically signed. Dec 5 2018 2:33PM \par  [de-identified] : See chart. [de-identified] : Labs done, all wnl.

## 2023-05-01 NOTE — PHYSICAL EXAM
[General Appearance - Alert] : alert [General Appearance - In No Acute Distress] : in no acute distress [Oriented To Time, Place, And Person] : oriented to person, place, and time [Impaired Insight] : insight and judgment were intact [Affect] : the affect was normal [Person] : oriented to person [Place] : oriented to place [Registration Intact] : recent registration memory intact [Concentration Intact] : normal concentrating ability [Visual Intact] : visual attention was ~T not ~L decreased [Naming Objects] : no difficulty naming common objects [Repeating Phrases] : no difficulty repeating a phrase [Writing A Sentence] : no difficulty writing a sentence [Fluency] : fluency intact [Comprehension] : comprehension intact [Reading] : reading intact [Past History] : adequate knowledge of personal past history [Total Score ___ / 30] : the patient achieved a score of [unfilled] /30 [Date / Time ___ / 5] : date / time [unfilled] / 5 [Place ___ / 5] : place [unfilled] / 5 [Registration ___ / 3] : registration [unfilled] / 3 [Serial Sevens ___/5] : serial sevens [unfilled] / 5 [Naming 2 Objects ___ / 2] : naming two objects [unfilled] / 2 [Repeating a Sentence ___ / 1] : repeating a sentence [unfilled] / 1 [Writing a Sentence ___ / 1] : write sentence [unfilled] / 1 [3-stage Verbal Command ___ / 3] : three-stage verbal command [unfilled] / 3 [Written Command ___ / 1] : written command [unfilled] / 1 [Copy a Design ___ / 1] : copy a design [unfilled] / 1 [Recall ___ / 3] : recall [unfilled] / 3 [Cranial Nerves Optic (II)] : visual acuity intact bilaterally,  visual fields full to confrontation, pupils equal round and reactive to light [Cranial Nerves Oculomotor (III)] : extraocular motion intact [Cranial Nerves Trigeminal (V)] : facial sensation intact symmetrically [Cranial Nerves Facial (VII)] : face symmetrical [Cranial Nerves Vestibulocochlear (VIII)] : hearing was intact bilaterally [Cranial Nerves Glossopharyngeal (IX)] : tongue and palate midline [Cranial Nerves Accessory (XI - Cranial And Spinal)] : head turning and shoulder shrug symmetric [Cranial Nerves Hypoglossal (XII)] : there was no tongue deviation with protrusion [Motor Tone] : muscle tone was normal in all four extremities [Motor Strength] : muscle strength was normal in all four extremities [No Muscle Atrophy] : normal bulk in all four extremities [Motor Handedness Right-Handed] : the patient is right hand dominant [Sensation Tactile Decrease] : light touch was intact [Sensation Pain / Temperature Decrease] : pain and temperature was intact [Proprioception] : proprioception was intact [Romberg's Sign] : a positive Romberg's sign was present [Balance] : balance was intact [Limited Balance] : the patient's balance was impaired [1+] : Brachioradialis left 1+ [0] : Ankle jerk left 0 [Sclera] : the sclera and conjunctiva were normal [PERRL With Normal Accommodation] : pupils were equal in size, round, reactive to light, with normal accommodation [Extraocular Movements] : extraocular movements were intact [No APD] : no afferent pupillary defect [No HELGA] : no internuclear ophthalmoplegia [Full Visual Field] : full visual field [Outer Ear] : the ears and nose were normal in appearance [Neck Appearance] : the appearance of the neck was normal [Auscultation Breath Sounds / Voice Sounds] : lungs were clear to auscultation bilaterally [Edema] : there was no peripheral edema [Abdomen Mass (___ Cm)] : no abdominal mass palpated [No CVA Tenderness] : no ~M costovertebral angle tenderness [Involuntary Movements] : no involuntary movements were seen [Skin Color & Pigmentation] : normal skin color and pigmentation [] : no rash [Time] : disoriented to time [Short Term Intact] : short term memory impaired [Span Intact] : the attention span was decreased [Current Events] : inadequate knowledge of current events [Motor Strength Upper Extremities Bilaterally] : strength was normal in both upper extremities [Motor Strength Lower Extremities Bilaterally] : strength was normal in both lower extremities [Allodynia] : no ~T allodynia present [Dysesthesia] : no dysesthesia [Hyperesthesia] : no hyperesthesia [Past-pointing] : there was no past-pointing [Tremor] : no tremor present [Plantar Reflex Right Only] : normal on the right [Plantar Reflex Left Only] : normal on the left [FreeTextEntry4] : Mental Status Exam\par Presidents: 0/5\par Alternating Pattern: ok\par Spiral: ok\par Clock: 1/3, numbers BW\par Repetition: ok\par R/L discrimination on self and examiner: ok\par Cross-line commands: ok\par Praxis: \par -Motor: ok\par -Dynamic/Luria: ok\par -Ideomotor/Imitation: ok \par -Ideational/writing/closing-in: ok\par -Dressing: ok. [FreeTextEntry6] : Foot drop bilat, uses braces [FreeTextEntry7] : loss of vib sensation in UE and LE distally [FreeTextEntry8] : walks with cane, stepping gait, cautioned; can walk well even with no support, but tends to get tired. [FreeTextEntry1] : bilat cataracts

## 2023-05-01 NOTE — HISTORY OF PRESENT ILLNESS
[FreeTextEntry1] : COVID VACCINATION FULL.\par NO COVID.\par \par HPI-Interval Hx [id number]:\par Pt here with wife and daughter.\par Reduced some HTN meds, BP was low, now stable.\par Appetite still ok.\par Sleep ok, naps in the pm.\par Motor is stable, has started to use walker more for safety.\par Needs more reminders for some ADL, but overall no major issues.\par \par HPI-Interval Hx [id number]:\par Pt at home with family. \par Recent AngioRx Heart and stress test.\par Come changes in meds, DC lasix and ASA81.\par \par Pt feels fine. \par Per family, he needs more reminders, for ADL and IADL.\par CDR now 1.0.\par \par Sleep and appetite are stable.\par His weight is 140lb at home now, which is lower than what measured in the office.\par \par Motor: per family he is stable, but not very active. One fall from turning too fast once after closing a closet.\par \par \par HPI-Interval Hx [id number]:\par Per pt and family her has been stable, eats and sleeps well.\par Tries to keep busy at home, but very limited.\par Somehow does not want to go out too much, due to COVID.\par \par \par \par \par HPI-Interval Hx [id number]:\par pt here with wife. \par Per pt he feels well, things are stable. \par Per wife, he has been stable.\par Weight and appetite ok.\par Sleep ok.\par Motor ok, though one recent night he tripped on something on the floor, mild fall, no consequences.\par Has started Allopurinol for Gout. \par \par \par HPI-Interval Hx [id number]:\par Taking Aricept at night, doing ok.\par Sleep ok.\par Appetite: ok.\par Motor: stable, limited by COVID, but no major changes. \par He has had a few episodes where he has thought something was taken from him, and in a few occasions he has been speaking about the past as if he was there, unclear if there were VH or AH. He denies.\par He has been speaking [place]ian with his brothers and cousins, from time to time.\par \par ADL: full, needs some reminders\par IADL: no driving anymore, since NPT (?card sorting?); wife has always done finances and bills and taxes; never good at PC; ok with TV and phone\par CDR: 0.5.\par \par \par HPI-interval hx [id number] RPA: AWT used.\par Has tried to change Aricept to the am, but got diarrhea, so taking it at night. \par Sleeps well.\par Appetite intact, no issues.\par Gait ok.\par \par \par HPI-interval hx [id number]:\par pt here with wife and daughter.\par He has been stable.\par NPT reviewed with Dr. Ewing, aMCI seems the case.\par He has been stable, maybe better, more attentive, makes extra efforts. \par More attentive to his hydration as well.\par ADL and IADL stable.\par Driving ok.\par Got CT heart and CoronaryAngioRx, so far ok. Still with some tachycardia. \par \par HPI: 77yo RH WM, with HTN, HLD, diet controlled DM II, ? CAD, here for report of forgetfulness. Radiculopathy in LE, with drop foot bilat, due to L4-L5 herniation (laminectomy in 2012).\par \par PMH: Pt seen by Dr. Levine recently.\par Per wife, he has shown, over the last 6-12 months, STM issues, forgetting recent events, upcoming appointments. No problems with names, locations, faces.\par No changes in behavior.\par Imaging and NPT done recently. 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 \par \par Motor symptoms: limited by LE radiculopathy, uses braces\par \par B/B: no issues, some polyuria. \par \par Psychiatric symptoms: no issues. \par \par ADL: intact\par IADL: full, drives, no issues\par CDR: 0.5.\par \par Professional status: retired, former .\par \par PCP and other physicians:\par -PCP: Carlito\par -Neuro: Abner.

## 2023-05-07 NOTE — DISCUSSION/SUMMARY
[Coronary Artery Disease] : coronary artery disease [Hyperlipidemia] : hyperlipidemia [Stable] : stable [Responding to Treatment] : responding to treatment [EKG obtained to assist in diagnosis and management of assessed problem(s)] : EKG obtained to assist in diagnosis and management of assessed problem(s)

## 2023-05-07 NOTE — PHYSICAL EXAM
[General Appearance - Well Developed] : well developed [Normal Appearance] : normal appearance [Well Groomed] : well groomed [General Appearance - Well Nourished] : well nourished [No Deformities] : no deformities [General Appearance - In No Acute Distress] : no acute distress [Eyelids - No Xanthelasma] : the eyelids demonstrated no xanthelasmas [Normal Conjunctiva] : the conjunctiva exhibited no abnormalities [Normal Oral Mucosa] : normal oral mucosa [No Oral Pallor] : no oral pallor [No Oral Cyanosis] : no oral cyanosis [Normal Jugular Venous A Waves Present] : normal jugular venous A waves present [Normal Jugular Venous V Waves Present] : normal jugular venous V waves present [No Jugular Venous Telles A Waves] : no jugular venous telles A waves [Respiration, Rhythm And Depth] : normal respiratory rhythm and effort [Exaggerated Use Of Accessory Muscles For Inspiration] : no accessory muscle use [Auscultation Breath Sounds / Voice Sounds] : lungs were clear to auscultation bilaterally [Heart Rate And Rhythm] : heart rate and rhythm were normal [Heart Sounds] : normal S1 and S2 [Murmurs] : no murmurs present [Abdomen Soft] : soft [Abdomen Tenderness] : non-tender [Abdomen Mass (___ Cm)] : no abdominal mass palpated [Abnormal Walk] : normal gait [Nail Clubbing] : no clubbing of the fingernails [Cyanosis, Localized] : no localized cyanosis [Petechial Hemorrhages (___cm)] : no petechial hemorrhages [] : no rash [Skin Color & Pigmentation] : normal skin color and pigmentation [No Venous Stasis] : no venous stasis [Skin Lesions] : no skin lesions [No Skin Ulcers] : no skin ulcer [No Xanthoma] : no  xanthoma was observed [Affect] : the affect was normal [Oriented To Time, Place, And Person] : oriented to person, place, and time [Mood] : the mood was normal [No Anxiety] : not feeling anxious [FreeTextEntry1] : uses  a cane

## 2023-05-07 NOTE — ASSESSMENT
[FreeTextEntry1] : We will discontinue aspirin as per new guidelines lipids are satisfactory although A1c is elevated\par \par addendum 9/9/22\par a pharmacologic nuclear stress test is requested in order to assess exertional symptoms which may represent angina in the setting of prior coronary disease\par \par Addendum 11/16/2022 \par make atorva 10 daily dc furosemide elevated creat borderline potassium repeat blood 1 week \par \par addendum 4/12/23\par no new cardiac recommendations. \par \par Addendum\par No new cardiac recommendations continue fluid resuscitation I discussed with Love at bedside.\par \par Medical necessity\par This is a low risk encounter based upon 2 or more chronic illnesses\par \par \par discussed with Love at the bedside is quite supportive.  And Ainsley his wife\par \par

## 2023-05-07 NOTE — CARDIOLOGY SUMMARY
[___] : [unfilled] [LVEF ___%] : LVEF [unfilled]% [de-identified] : 3/11/22 normal/rhythm anterior vesicular block [de-identified] : 5/7/19 calcium score 2526 which is severely elevated

## 2023-05-07 NOTE — REASON FOR VISIT
[FreeTextEntry1] : Mati was seen by dermatology group Dr. Celaya a cardiac standpoint is stable and without incarnate complaints\par \par update 9/9/22\par Mati notes some dyspnea on exertion he broke out in a sweat while trying to help out in the garage he has exercise-induced symptoms which may represent angina\par \par Update 11/16/2022\par Mati is doing well on this current regimen however we note a slow but steady rise in creatinine the potassium is also at the upper limits of normal .a repeat renal is requested in 1 week time and furosemide is discontinued given euvolemic status and rising creatinine\par \par Update 1/18/2023\par Michael is doing well we have tapered his formulary discussed with Love encouraging fluid resuscitation creatinine slowly improving we will recheck renal indices.  Avoid ACE inhibitor with symptoms and renal failure.  Currently on carvedilol and Farxiga with good response\par \par 4/12/23\par update \par michael seems to be responding to the current regimen. we will continue the current course.

## 2023-05-09 LAB
ALBUMIN SERPL ELPH-MCNC: 3.9 G/DL
ALP BLD-CCNC: 120 U/L
ALT SERPL-CCNC: 16 U/L
ANION GAP SERPL CALC-SCNC: 10 MMOL/L
AST SERPL-CCNC: 21 U/L
BASOPHILS # BLD AUTO: 0.05 K/UL
BASOPHILS NFR BLD AUTO: 0.8 %
BILIRUB SERPL-MCNC: 0.5 MG/DL
BUN SERPL-MCNC: 28 MG/DL
CALCIUM SERPL-MCNC: 9.4 MG/DL
CHLORIDE SERPL-SCNC: 105 MMOL/L
CHOLEST SERPL-MCNC: 131 MG/DL
CO2 SERPL-SCNC: 24 MMOL/L
CREAT SERPL-MCNC: 1.23 MG/DL
EGFR: 59 ML/MIN/1.73M2
EOSINOPHIL # BLD AUTO: 0.37 K/UL
EOSINOPHIL NFR BLD AUTO: 6.1 %
GLUCOSE SERPL-MCNC: 102 MG/DL
HCT VFR BLD CALC: 42.6 %
HDLC SERPL-MCNC: 48 MG/DL
HGB BLD-MCNC: 13.9 G/DL
IMM GRANULOCYTES NFR BLD AUTO: 0.2 %
LDLC SERPL CALC-MCNC: 65 MG/DL
LYMPHOCYTES # BLD AUTO: 2.18 K/UL
LYMPHOCYTES NFR BLD AUTO: 35.8 %
MAN DIFF?: NORMAL
MCHC RBC-ENTMCNC: 32.6 GM/DL
MCHC RBC-ENTMCNC: 33.6 PG
MCV RBC AUTO: 102.9 FL
MONOCYTES # BLD AUTO: 0.69 K/UL
MONOCYTES NFR BLD AUTO: 11.3 %
NEUTROPHILS # BLD AUTO: 2.79 K/UL
NEUTROPHILS NFR BLD AUTO: 45.8 %
NONHDLC SERPL-MCNC: 83 MG/DL
PLATELET # BLD AUTO: 158 K/UL
POTASSIUM SERPL-SCNC: 4.8 MMOL/L
PROT SERPL-MCNC: 6.1 G/DL
RBC # BLD: 4.14 M/UL
RBC # FLD: 14.6 %
SODIUM SERPL-SCNC: 139 MMOL/L
TRIGL SERPL-MCNC: 87 MG/DL
WBC # FLD AUTO: 6.09 K/UL

## 2023-05-13 ENCOUNTER — RX RENEWAL (OUTPATIENT)
Age: 83
End: 2023-05-13

## 2023-05-18 PROBLEM — F41.9 ANXIETY: Status: ACTIVE | Noted: 2018-10-19

## 2023-05-19 ENCOUNTER — APPOINTMENT (OUTPATIENT)
Dept: FAMILY MEDICINE | Facility: CLINIC | Age: 83
End: 2023-05-19
Payer: MEDICARE

## 2023-05-19 ENCOUNTER — RESULT CHARGE (OUTPATIENT)
Age: 83
End: 2023-05-19

## 2023-05-19 VITALS
WEIGHT: 148 LBS | DIASTOLIC BLOOD PRESSURE: 72 MMHG | RESPIRATION RATE: 18 BRPM | HEART RATE: 86 BPM | SYSTOLIC BLOOD PRESSURE: 138 MMHG | BODY MASS INDEX: 23.78 KG/M2 | HEIGHT: 66 IN | TEMPERATURE: 97.8 F | OXYGEN SATURATION: 96 %

## 2023-05-19 DIAGNOSIS — F41.9 ANXIETY DISORDER, UNSPECIFIED: ICD-10-CM

## 2023-05-19 LAB — HBA1C MFR BLD HPLC: 6.5

## 2023-05-19 PROCEDURE — 99214 OFFICE O/P EST MOD 30 MIN: CPT

## 2023-05-19 RX ORDER — TOBRAMYCIN 3 MG/ML
0.3 SOLUTION/ DROPS OPHTHALMIC 4 TIMES DAILY
Qty: 1 | Refills: 0 | Status: COMPLETED | COMMUNITY
Start: 2023-05-19 | End: 2023-05-24

## 2023-05-19 NOTE — HEALTH RISK ASSESSMENT
[No] : No [Any fall with injury in past year] : Patient reported fall with injury in the past year [0] : 2) Feeling down, depressed, or hopeless: Not at all (0) [de-identified] : 11/2022 went to ER for laceration arm

## 2023-05-19 NOTE — PHYSICAL EXAM
[No Acute Distress] : no acute distress [Well-Appearing] : well-appearing [No JVD] : no jugular venous distention [Regular Rhythm] : with a regular rhythm [Normal S1, S2] : normal S1 and S2 [Normal] : no joint swelling and grossly normal strength and tone [de-identified] : right pink eye

## 2023-05-19 NOTE — REVIEW OF SYSTEMS
[Discharge] : discharge [Redness] : redness [Itching] : itching [Unsteady Walk] : ataxia [FreeTextEntry3] : tearing [FreeTextEntry9] : unstable gait [de-identified] : uses braces on feet  [de-identified] : MCI

## 2023-05-19 NOTE — HISTORY OF PRESENT ILLNESS
[Coronary Artery Disease] : Coronary Artery Disease [Diabetes Mellitus] : Diabetes Mellitus [Hyperlipidemia] : Hyperlipidemia [Hypertension] : Hypertension [Other: ___] : [unfilled]: [Understanding of foot care] : Patient expressed understanding of foot care [Checks BP Regularly] : The patient checks ~his/her~ blood pressure regularly [Stable] : Patient is stable [Stable] : Overall status has been stable [Family Member] : family member [Other: _____] : [unfilled] [FreeTextEntry6] : 83 y/o PMHX HTn HLD DM2 MCI, gait instability here with family for f/u medical conditions  daughter here with parents notes dad has been rubbing right eye and woke up with  little green junk on right eye and has been like that since Tuesday and now also runny producing lots tears. Pt also went to [facility] and all stable now

## 2023-06-02 ENCOUNTER — NON-APPOINTMENT (OUTPATIENT)
Age: 83
End: 2023-06-02

## 2023-06-06 LAB
CREAT SPEC-SCNC: 84 MG/DL
MICROALBUMIN 24H UR DL<=1MG/L-MCNC: <1.2 MG/DL
MICROALBUMIN/CREAT 24H UR-RTO: NORMAL MG/G

## 2023-06-12 ENCOUNTER — RX RENEWAL (OUTPATIENT)
Age: 83
End: 2023-06-12

## 2023-06-22 ENCOUNTER — RX RENEWAL (OUTPATIENT)
Age: 83
End: 2023-06-22

## 2023-06-24 ENCOUNTER — RX RENEWAL (OUTPATIENT)
Age: 83
End: 2023-06-24

## 2023-07-12 DIAGNOSIS — R45.1 RESTLESSNESS AND AGITATION: ICD-10-CM

## 2023-07-18 ENCOUNTER — APPOINTMENT (OUTPATIENT)
Dept: CARDIOLOGY | Facility: CLINIC | Age: 83
End: 2023-07-18
Payer: MEDICARE

## 2023-07-18 ENCOUNTER — NON-APPOINTMENT (OUTPATIENT)
Age: 83
End: 2023-07-18

## 2023-07-18 VITALS
HEART RATE: 74 BPM | HEIGHT: 66 IN | DIASTOLIC BLOOD PRESSURE: 63 MMHG | RESPIRATION RATE: 19 BRPM | SYSTOLIC BLOOD PRESSURE: 160 MMHG | TEMPERATURE: 95.3 F | OXYGEN SATURATION: 95 %

## 2023-07-18 DIAGNOSIS — I25.10 ATHEROSCLEROTIC HEART DISEASE OF NATIVE CORONARY ARTERY W/OUT ANGINA PECTORIS: ICD-10-CM

## 2023-07-18 PROCEDURE — 99212 OFFICE O/P EST SF 10 MIN: CPT | Mod: 25

## 2023-07-18 PROCEDURE — 93000 ELECTROCARDIOGRAM COMPLETE: CPT

## 2023-08-06 NOTE — REASON FOR VISIT
[FreeTextEntry1] : Mati was seen by dermatology group Dr. Celaya a cardiac standpoint is stable and without incarnate complaints  update 9/9/22 Mati notes some dyspnea on exertion he broke out in a sweat while trying to help out in the garage he has exercise-induced symptoms which may represent angina  Update 11/16/2022 Mati is doing well on this current regimen however we note a slow but steady rise in creatinine the potassium is also at the upper limits of normal .a repeat renal is requested in 1 week time and furosemide is discontinued given euvolemic status and rising creatinine  Update 1/18/2023 Michael is doing well we have tapered his formulary discussed with Love encouraging fluid resuscitation creatinine slowly improving we will recheck renal indices.  Avoid ACE inhibitor with symptoms and renal failure.  Currently on carvedilol and Farxiga with good response  4/12/23 update  michael seems to be responding to the current regimen. we will continue the current course.   7/18/23 now on rexulti without ill effect

## 2023-08-06 NOTE — HISTORY OF PRESENT ILLNESS
[FreeTextEntry1] : now on rexulti without apparent ill effects on titrated increased dosing.   7/18/23 tolerating current medical regimen without effects denies chest pains or shortness of breath

## 2023-08-06 NOTE — ASSESSMENT
[FreeTextEntry1] : We will discontinue aspirin as per new guidelines lipids are satisfactory although A1c is elevated  addendum 9/9/22 a pharmacologic nuclear stress test is requested in order to assess exertional symptoms which may represent angina in the setting of prior coronary disease  Addendum 11/16/2022  make atorva 10 daily dc furosemide elevated creat borderline potassium repeat blood 1 week   addendum 4/12/23 no new cardiac recommendations.   Addendum 7/18/23 no new cardiac recommendations   Medical necessity This is a low risk encounter based upon 2 or more chronic illnesses   discussed with Britany at the bedside is quite supportive.  And Ainsley his wife

## 2023-08-06 NOTE — PHYSICAL EXAM
[Well Developed] : well developed [Well Nourished] : well nourished [No Acute Distress] : no acute distress [Normal Venous Pressure] : normal venous pressure [No Carotid Bruit] : no carotid bruit [Normal S1, S2] : normal S1, S2 [No Murmur] : no murmur [No Rub] : no rub [No Gallop] : no gallop [Clear Lung Fields] : clear lung fields [Good Air Entry] : good air entry [No Respiratory Distress] : no respiratory distress  [Soft] : abdomen soft [Non Tender] : non-tender [No Masses/organomegaly] : no masses/organomegaly [Normal Bowel Sounds] : normal bowel sounds [Normal Gait] : normal gait [No Edema] : no edema [No Cyanosis] : no cyanosis [No Clubbing] : no clubbing [No Varicosities] : no varicosities [No Rash] : no rash [No Skin Lesions] : no skin lesions [Moves all extremities] : moves all extremities [No Focal Deficits] : no focal deficits [Normal Speech] : normal speech [Alert and Oriented] : alert and oriented [Normal memory] : normal memory [General Appearance - Well Developed] : well developed [Normal Appearance] : normal appearance [Well Groomed] : well groomed [General Appearance - Well Nourished] : well nourished [No Deformities] : no deformities [General Appearance - In No Acute Distress] : no acute distress [Normal Conjunctiva] : the conjunctiva exhibited no abnormalities [Eyelids - No Xanthelasma] : the eyelids demonstrated no xanthelasmas [Normal Oral Mucosa] : normal oral mucosa [No Oral Pallor] : no oral pallor [No Oral Cyanosis] : no oral cyanosis [Normal Jugular Venous A Waves Present] : normal jugular venous A waves present [Normal Jugular Venous V Waves Present] : normal jugular venous V waves present [No Jugular Venous Telles A Waves] : no jugular venous telles A waves [Respiration, Rhythm And Depth] : normal respiratory rhythm and effort [Exaggerated Use Of Accessory Muscles For Inspiration] : no accessory muscle use [Auscultation Breath Sounds / Voice Sounds] : lungs were clear to auscultation bilaterally [Heart Rate And Rhythm] : heart rate and rhythm were normal [Heart Sounds] : normal S1 and S2 [Murmurs] : no murmurs present [Abdomen Soft] : soft [Abdomen Tenderness] : non-tender [Abdomen Mass (___ Cm)] : no abdominal mass palpated [Abnormal Walk] : normal gait [Nail Clubbing] : no clubbing of the fingernails [Cyanosis, Localized] : no localized cyanosis [Petechial Hemorrhages (___cm)] : no petechial hemorrhages [Skin Color & Pigmentation] : normal skin color and pigmentation [] : no rash [No Venous Stasis] : no venous stasis [Skin Lesions] : no skin lesions [No Skin Ulcers] : no skin ulcer [No Xanthoma] : no  xanthoma was observed [Oriented To Time, Place, And Person] : oriented to person, place, and time [Affect] : the affect was normal [Mood] : the mood was normal [No Anxiety] : not feeling anxious [FreeTextEntry1] : uses  a cane

## 2023-08-16 ENCOUNTER — OUTPATIENT (OUTPATIENT)
Dept: OUTPATIENT SERVICES | Facility: HOSPITAL | Age: 83
LOS: 1 days | End: 2023-08-16
Payer: MEDICARE

## 2023-08-16 ENCOUNTER — APPOINTMENT (OUTPATIENT)
Dept: CARDIOLOGY | Facility: CLINIC | Age: 83
End: 2023-08-16
Payer: MEDICARE

## 2023-08-16 ENCOUNTER — APPOINTMENT (OUTPATIENT)
Dept: RADIOLOGY | Facility: HOSPITAL | Age: 83
End: 2023-08-16
Payer: MEDICARE

## 2023-08-16 VITALS
TEMPERATURE: 96.2 F | BODY MASS INDEX: 23.95 KG/M2 | DIASTOLIC BLOOD PRESSURE: 72 MMHG | WEIGHT: 149 LBS | HEIGHT: 66 IN | HEART RATE: 66 BPM | SYSTOLIC BLOOD PRESSURE: 170 MMHG | RESPIRATION RATE: 20 BRPM | OXYGEN SATURATION: 100 %

## 2023-08-16 DIAGNOSIS — Z98.89 OTHER SPECIFIED POSTPROCEDURAL STATES: Chronic | ICD-10-CM

## 2023-08-16 DIAGNOSIS — I25.10 ATHEROSCLEROTIC HEART DISEASE OF NATIVE CORONARY ARTERY WITHOUT ANGINA PECTORIS: ICD-10-CM

## 2023-08-16 PROCEDURE — 71046 X-RAY EXAM CHEST 2 VIEWS: CPT

## 2023-08-16 PROCEDURE — 93000 ELECTROCARDIOGRAM COMPLETE: CPT

## 2023-08-16 PROCEDURE — 99214 OFFICE O/P EST MOD 30 MIN: CPT | Mod: 25

## 2023-08-16 PROCEDURE — 71046 X-RAY EXAM CHEST 2 VIEWS: CPT | Mod: 26

## 2023-08-19 NOTE — CARDIOLOGY SUMMARY
[de-identified] : 3/11/22 normal/rhythm anterior vesicular block [de-identified] : 5/7/19 calcium score 2526 which is severely elevated [___] : [unfilled] [LVEF ___%] : LVEF [unfilled]%

## 2023-08-19 NOTE — PHYSICAL EXAM
[Well Developed] : well developed [Well Nourished] : well nourished [No Acute Distress] : no acute distress [Normal Venous Pressure] : normal venous pressure [No Carotid Bruit] : no carotid bruit [Normal S1, S2] : normal S1, S2 [No Murmur] : no murmur [No Rub] : no rub [No Gallop] : no gallop [Clear Lung Fields] : clear lung fields [Good Air Entry] : good air entry [No Respiratory Distress] : no respiratory distress  [Soft] : abdomen soft [Non Tender] : non-tender [No Masses/organomegaly] : no masses/organomegaly [Normal Bowel Sounds] : normal bowel sounds [Normal Gait] : normal gait [No Edema] : no edema [No Cyanosis] : no cyanosis [No Clubbing] : no clubbing [No Varicosities] : no varicosities [No Rash] : no rash [No Skin Lesions] : no skin lesions [Moves all extremities] : moves all extremities [No Focal Deficits] : no focal deficits [Normal Speech] : normal speech [Alert and Oriented] : alert and oriented [Normal memory] : normal memory [General Appearance - Well Developed] : well developed [Normal Appearance] : normal appearance [Well Groomed] : well groomed [General Appearance - Well Nourished] : well nourished [No Deformities] : no deformities [General Appearance - In No Acute Distress] : no acute distress [Normal Conjunctiva] : the conjunctiva exhibited no abnormalities [Eyelids - No Xanthelasma] : the eyelids demonstrated no xanthelasmas [Normal Oral Mucosa] : normal oral mucosa [No Oral Pallor] : no oral pallor [No Oral Cyanosis] : no oral cyanosis [Normal Jugular Venous A Waves Present] : normal jugular venous A waves present [Normal Jugular Venous V Waves Present] : normal jugular venous V waves present [No Jugular Venous Telles A Waves] : no jugular venous telles A waves [Respiration, Rhythm And Depth] : normal respiratory rhythm and effort [Exaggerated Use Of Accessory Muscles For Inspiration] : no accessory muscle use [Auscultation Breath Sounds / Voice Sounds] : lungs were clear to auscultation bilaterally [Heart Rate And Rhythm] : heart rate and rhythm were normal [Heart Sounds] : normal S1 and S2 [Murmurs] : no murmurs present [Abdomen Soft] : soft [Abdomen Tenderness] : non-tender [Abdomen Mass (___ Cm)] : no abdominal mass palpated [Abnormal Walk] : normal gait [FreeTextEntry1] : uses  a cane [Nail Clubbing] : no clubbing of the fingernails [Cyanosis, Localized] : no localized cyanosis [Petechial Hemorrhages (___cm)] : no petechial hemorrhages [Skin Color & Pigmentation] : normal skin color and pigmentation [] : no rash [No Venous Stasis] : no venous stasis [Skin Lesions] : no skin lesions [No Skin Ulcers] : no skin ulcer [No Xanthoma] : no  xanthoma was observed [Oriented To Time, Place, And Person] : oriented to person, place, and time [Affect] : the affect was normal [Mood] : the mood was normal [No Anxiety] : not feeling anxious

## 2023-08-19 NOTE — ASSESSMENT
[FreeTextEntry1] : We will discontinue aspirin as per new guidelines lipids are satisfactory although A1c is elevated  addendum 9/9/22 a pharmacologic nuclear stress test is requested in order to assess exertional symptoms which may represent angina in the setting of prior coronary disease  Addendum 11/16/2022  make atorva 10 daily dc furosemide elevated creat borderline potassium repeat blood 1 week   addendum 4/12/23 no new cardiac recommendations.   Addendum 7/18/23 no new cardiac recommendations  8/16/23 no obvious cause for cough identified however CT scan just is requested in order to exclude an aortic enters him   Medical necessity This is a lHIGH risk encounter based upon 2 or more chronic illnesses with exacerbation of peripheral vascular disease requiring a CAT scan   discussed with Britany at the bedside is quite supportive.  And Ainsley his wife

## 2023-08-19 NOTE — REASON FOR VISIT
[FreeTextEntry1] : Mati was seen by dermatology group Dr. Celaya a cardiac standpoint is stable and without incarnate complaints  update 9/9/22 Mati notes some dyspnea on exertion he broke out in a sweat while trying to help out in the garage he has exercise-induced symptoms which may represent angina  Update 11/16/2022 Mati is doing well on this current regimen however we note a slow but steady rise in creatinine the potassium is also at the upper limits of normal .a repeat renal is requested in 1 week time and furosemide is discontinued given euvolemic status and rising creatinine  Update 1/18/2023 Michael is doing well we have tapered his formulary discussed with Love encouraging fluid resuscitation creatinine slowly improving we will recheck renal indices.  Avoid ACE inhibitor with symptoms and renal failure.  Currently on carvedilol and Farxiga with good response  4/12/23 update  michael seems to be responding to the current regimen. we will continue the current course.   7/18/23 now on rexulti without ill effect  8/16/23 Mati complains of a cough and a chest x-ray is requested result has returned NO OBVIOUS CAUSE FOR THE COFFEE HAVE A CONCERN WITH RATED regarding a possible aortic aneurysm and a CAT scan was suggested by [facility]

## 2023-08-28 RX ORDER — DIAZEPAM 2 MG/1
2 TABLET ORAL
Qty: 2 | Refills: 0 | Status: ACTIVE | COMMUNITY
Start: 2023-08-25 | End: 1900-01-01

## 2023-09-27 ENCOUNTER — OUTPATIENT (OUTPATIENT)
Dept: OUTPATIENT SERVICES | Facility: HOSPITAL | Age: 83
LOS: 1 days | End: 2023-09-27
Payer: MEDICARE

## 2023-09-27 ENCOUNTER — APPOINTMENT (OUTPATIENT)
Dept: CT IMAGING | Facility: HOSPITAL | Age: 83
End: 2023-09-27
Payer: MEDICARE

## 2023-09-27 DIAGNOSIS — Z98.89 OTHER SPECIFIED POSTPROCEDURAL STATES: Chronic | ICD-10-CM

## 2023-09-27 DIAGNOSIS — I71.21 ANEURYSM OF THE ASCENDING AORTA, WITHOUT RUPTURE: ICD-10-CM

## 2023-09-27 PROCEDURE — 71275 CT ANGIOGRAPHY CHEST: CPT | Mod: 26

## 2023-09-27 PROCEDURE — 71275 CT ANGIOGRAPHY CHEST: CPT

## 2023-10-13 ENCOUNTER — RX RENEWAL (OUTPATIENT)
Age: 83
End: 2023-10-13

## 2023-11-14 ENCOUNTER — RX RENEWAL (OUTPATIENT)
Age: 83
End: 2023-11-14

## 2023-11-15 ENCOUNTER — APPOINTMENT (OUTPATIENT)
Dept: FAMILY MEDICINE | Facility: CLINIC | Age: 83
End: 2023-11-15
Payer: MEDICARE

## 2023-11-15 VITALS
RESPIRATION RATE: 20 BRPM | HEART RATE: 86 BPM | HEIGHT: 66 IN | BODY MASS INDEX: 21.86 KG/M2 | SYSTOLIC BLOOD PRESSURE: 139 MMHG | OXYGEN SATURATION: 98 % | TEMPERATURE: 98.6 F | DIASTOLIC BLOOD PRESSURE: 44 MMHG | WEIGHT: 136 LBS

## 2023-11-15 VITALS — DIASTOLIC BLOOD PRESSURE: 78 MMHG | SYSTOLIC BLOOD PRESSURE: 142 MMHG

## 2023-11-15 DIAGNOSIS — E78.5 HYPERLIPIDEMIA, UNSPECIFIED: ICD-10-CM

## 2023-11-15 DIAGNOSIS — M21.371 FOOT DROP, RIGHT FOOT: ICD-10-CM

## 2023-11-15 DIAGNOSIS — Z00.00 ENCOUNTER FOR GENERAL ADULT MEDICAL EXAMINATION W/OUT ABNORMAL FINDINGS: ICD-10-CM

## 2023-11-15 DIAGNOSIS — Z09 ENCOUNTER FOR FOLLOW-UP EXAMINATION AFTER COMPLETED TREATMENT FOR CONDITIONS OTHER THAN MALIGNANT NEOPLASM: ICD-10-CM

## 2023-11-15 DIAGNOSIS — R60.0 LOCALIZED EDEMA: ICD-10-CM

## 2023-11-15 DIAGNOSIS — H10.021 OTHER MUCOPURULENT CONJUNCTIVITIS, RIGHT EYE: ICD-10-CM

## 2023-11-15 DIAGNOSIS — R26.81 UNSTEADINESS ON FEET: ICD-10-CM

## 2023-11-15 DIAGNOSIS — Z86.39 PERSONAL HISTORY OF OTHER ENDOCRINE, NUTRITIONAL AND METABOLIC DISEASE: ICD-10-CM

## 2023-11-15 DIAGNOSIS — M54.50 LOW BACK PAIN, UNSPECIFIED: ICD-10-CM

## 2023-11-15 DIAGNOSIS — I10 ESSENTIAL (PRIMARY) HYPERTENSION: ICD-10-CM

## 2023-11-15 DIAGNOSIS — H91.90 UNSPECIFIED HEARING LOSS, UNSPECIFIED EAR: ICD-10-CM

## 2023-11-15 PROCEDURE — G0008: CPT

## 2023-11-15 PROCEDURE — G0439: CPT

## 2023-11-15 PROCEDURE — 90662 IIV NO PRSV INCREASED AG IM: CPT

## 2023-11-21 ENCOUNTER — NON-APPOINTMENT (OUTPATIENT)
Age: 83
End: 2023-11-21

## 2023-11-21 ENCOUNTER — APPOINTMENT (OUTPATIENT)
Dept: CARDIOLOGY | Facility: CLINIC | Age: 83
End: 2023-11-21
Payer: MEDICARE

## 2023-11-21 VITALS
DIASTOLIC BLOOD PRESSURE: 69 MMHG | OXYGEN SATURATION: 97 % | SYSTOLIC BLOOD PRESSURE: 118 MMHG | WEIGHT: 138 LBS | HEIGHT: 66 IN | HEART RATE: 66 BPM | BODY MASS INDEX: 22.18 KG/M2

## 2023-11-21 PROCEDURE — 93000 ELECTROCARDIOGRAM COMPLETE: CPT

## 2023-11-21 PROCEDURE — 99212 OFFICE O/P EST SF 10 MIN: CPT | Mod: 25

## 2023-11-24 ENCOUNTER — OUTPATIENT (OUTPATIENT)
Dept: OUTPATIENT SERVICES | Facility: HOSPITAL | Age: 83
LOS: 1 days | End: 2023-11-24
Payer: MEDICARE

## 2023-11-24 ENCOUNTER — APPOINTMENT (OUTPATIENT)
Dept: ULTRASOUND IMAGING | Facility: HOSPITAL | Age: 83
End: 2023-11-24
Payer: MEDICARE

## 2023-11-24 ENCOUNTER — APPOINTMENT (OUTPATIENT)
Dept: ULTRASOUND IMAGING | Facility: HOSPITAL | Age: 83
End: 2023-11-24

## 2023-11-24 DIAGNOSIS — Z98.89 OTHER SPECIFIED POSTPROCEDURAL STATES: Chronic | ICD-10-CM

## 2023-11-24 DIAGNOSIS — Z00.8 ENCOUNTER FOR OTHER GENERAL EXAMINATION: ICD-10-CM

## 2023-11-24 PROCEDURE — 76775 US EXAM ABDO BACK WALL LIM: CPT | Mod: 26

## 2023-11-24 PROCEDURE — 76775 US EXAM ABDO BACK WALL LIM: CPT

## 2023-12-12 LAB
ALBUMIN SERPL ELPH-MCNC: 3.8 G/DL
ALP BLD-CCNC: 124 U/L
ALT SERPL-CCNC: 15 U/L
ANION GAP SERPL CALC-SCNC: 9 MMOL/L
AST SERPL-CCNC: 18 U/L
BILIRUB SERPL-MCNC: 0.6 MG/DL
BUN SERPL-MCNC: 27 MG/DL
CALCIUM SERPL-MCNC: 9.3 MG/DL
CHLORIDE SERPL-SCNC: 107 MMOL/L
CHOLEST SERPL-MCNC: 129 MG/DL
CO2 SERPL-SCNC: 26 MMOL/L
CREAT SERPL-MCNC: 1.26 MG/DL
EGFR: 57 ML/MIN/1.73M2
ESTIMATED AVERAGE GLUCOSE: 131 MG/DL
FOLATE SERPL-MCNC: >20 NG/ML
GLUCOSE SERPL-MCNC: 97 MG/DL
HBA1C MFR BLD HPLC: 6.2 %
HDLC SERPL-MCNC: 47 MG/DL
LDLC SERPL CALC-MCNC: 65 MG/DL
NONHDLC SERPL-MCNC: 82 MG/DL
POTASSIUM SERPL-SCNC: 4.5 MMOL/L
PROT SERPL-MCNC: 6.1 G/DL
SODIUM SERPL-SCNC: 142 MMOL/L
TRIGL SERPL-MCNC: 87 MG/DL
VIT B12 SERPL-MCNC: 783 PG/ML

## 2023-12-23 ENCOUNTER — RX RENEWAL (OUTPATIENT)
Age: 83
End: 2023-12-23

## 2024-01-29 ENCOUNTER — RX RENEWAL (OUTPATIENT)
Age: 84
End: 2024-01-29

## 2024-01-29 RX ORDER — DONEPEZIL HYDROCHLORIDE 10 MG/1
10 TABLET ORAL DAILY
Qty: 90 | Refills: 3 | Status: ACTIVE | COMMUNITY
Start: 2019-07-08 | End: 1900-01-01

## 2024-02-05 ENCOUNTER — APPOINTMENT (OUTPATIENT)
Dept: NEUROLOGY | Facility: CLINIC | Age: 84
End: 2024-02-05
Payer: MEDICARE

## 2024-02-05 VITALS — DIASTOLIC BLOOD PRESSURE: 74 MMHG | HEART RATE: 74 BPM | SYSTOLIC BLOOD PRESSURE: 149 MMHG

## 2024-02-05 DIAGNOSIS — R68.89 OTHER GENERAL SYMPTOMS AND SIGNS: ICD-10-CM

## 2024-02-05 DIAGNOSIS — G62.9 POLYNEUROPATHY, UNSPECIFIED: ICD-10-CM

## 2024-02-05 DIAGNOSIS — R26.0 ATAXIC GAIT: ICD-10-CM

## 2024-02-05 PROCEDURE — 99214 OFFICE O/P EST MOD 30 MIN: CPT

## 2024-02-05 NOTE — DATA REVIEWED
[de-identified] : EXAM: MR BRAIN \par  \par  \par  PROCEDURE DATE: 12/05/2018 \par  \par  \par  \par  INTERPRETATION: CLINICAL STATEMENT: Increased memory loss \par  \par  TECHNIQUE: MRI of the brain was performed without gadolinium. \par  \par  COMPARISON: None. \par  \par  FINDINGS: \par  There is moderate diffuse parenchymal volume loss. There are T2 prolongation \par  signal abnormalities in the periventricular subcortical white matter likely \par  related to mild chronic microvascular ischemic changes. \par  \par  There is no acute parenchymal hemorrhage, parenchymal mass, mass effect or \par  midline shift. There is no extra-axial fluid collection. There is no \par  hydrocephalus. There is no acute infarct. Partial empty sella \par  \par  Loss of normal flow void in the intradural segment of distal right vertebral \par  artery noted. \par  \par  Mucosal thickening paranasal sinuses \par  \par  IMPRESSION: \par  No acute intracranial hemorrhage or acute infarct. \par  \par  Loss of normal flow void in the intradural segment of distal right vertebral \par  artery which may be related to occlusion/high-grade stenosis. \par  \par  \par  \par  \par  \par  \par  \par  \par  \par  ERYN GUPTA M.D., ATTENDING RADIOLOGIST \par  This document has been electronically signed. Dec 5 2018 2:33PM \par   [de-identified] : See chart. [de-identified] : Labs done, all wnl.

## 2024-02-05 NOTE — PHYSICAL EXAM
[General Appearance - Alert] : alert [General Appearance - In No Acute Distress] : in no acute distress [Oriented To Time, Place, And Person] : oriented to person, place, and time [Impaired Insight] : insight and judgment were intact [Affect] : the affect was normal [Person] : oriented to person [Place] : oriented to place [Time] : disoriented to time [Short Term Intact] : short term memory impaired [Registration Intact] : recent registration memory intact [Span Intact] : the attention span was decreased [Concentration Intact] : normal concentrating ability [Visual Intact] : visual attention was ~T not ~L decreased [Naming Objects] : no difficulty naming common objects [Repeating Phrases] : no difficulty repeating a phrase [Writing A Sentence] : no difficulty writing a sentence [Fluency] : fluency intact [Comprehension] : comprehension intact [Reading] : reading intact [Current Events] : inadequate knowledge of current events [Past History] : adequate knowledge of personal past history [Total Score ___ / 30] : the patient achieved a score of [unfilled] /30 [Date / Time ___ / 5] : date / time [unfilled] / 5 [Place ___ / 5] : place [unfilled] / 5 [Registration ___ / 3] : registration [unfilled] / 3 [Serial Sevens ___/5] : serial sevens [unfilled] / 5 [Naming 2 Objects ___ / 2] : naming two objects [unfilled] / 2 [Repeating a Sentence ___ / 1] : repeating a sentence [unfilled] / 1 [Writing a Sentence ___ / 1] : write sentence [unfilled] / 1 [3-stage Verbal Command ___ / 3] : three-stage verbal command [unfilled] / 3 [Written Command ___ / 1] : written command [unfilled] / 1 [Copy a Design ___ / 1] : copy a design [unfilled] / 1 [Recall ___ / 3] : recall [unfilled] / 3 [Cranial Nerves Optic (II)] : visual acuity intact bilaterally,  visual fields full to confrontation, pupils equal round and reactive to light [Cranial Nerves Oculomotor (III)] : extraocular motion intact [Cranial Nerves Trigeminal (V)] : facial sensation intact symmetrically [Cranial Nerves Facial (VII)] : face symmetrical [Cranial Nerves Vestibulocochlear (VIII)] : hearing was intact bilaterally [Cranial Nerves Glossopharyngeal (IX)] : tongue and palate midline [Cranial Nerves Accessory (XI - Cranial And Spinal)] : head turning and shoulder shrug symmetric [Cranial Nerves Hypoglossal (XII)] : there was no tongue deviation with protrusion [Motor Tone] : muscle tone was normal in all four extremities [Motor Strength] : muscle strength was normal in all four extremities [No Muscle Atrophy] : normal bulk in all four extremities [Motor Handedness Right-Handed] : the patient is right hand dominant [Motor Strength Upper Extremities Bilaterally] : strength was normal in both upper extremities [Motor Strength Lower Extremities Bilaterally] : strength was normal in both lower extremities [Sensation Tactile Decrease] : light touch was intact [Sensation Pain / Temperature Decrease] : pain and temperature was intact [Proprioception] : proprioception was intact [Romberg's Sign] : a positive Romberg's sign was present [Allodynia] : no ~T allodynia present [Dysesthesia] : no dysesthesia [Hyperesthesia] : no hyperesthesia [Balance] : balance was intact [Limited Balance] : the patient's balance was impaired [Past-pointing] : there was no past-pointing [Tremor] : no tremor present [1+] : Brachioradialis left 1+ [0] : Ankle jerk left 0 [Plantar Reflex Right Only] : normal on the right [Plantar Reflex Left Only] : normal on the left [FreeTextEntry4] : Mental Status Exam\par  Presidents: 0/5\par  Alternating Pattern: ok\par  Spiral: ok\par  Clock: 1/3, numbers BW\par  Repetition: ok\par  R/L discrimination on self and examiner: ok\par  Cross-line commands: ok\par  Praxis: \par  -Motor: ok\par  -Dynamic/Luria: ok\par  -Ideomotor/Imitation: ok \par  -Ideational/writing/closing-in: ok\par  -Dressing: ok. [FreeTextEntry6] : Foot drop bilat, uses braces; some hypotrophy in hands. [FreeTextEntry7] : loss of vib sensation in UE and LE distally [FreeTextEntry8] : walks with cane, stepping gait, cautioned; can walk well even with no support, but tends to get tired. [Sclera] : the sclera and conjunctiva were normal [PERRL With Normal Accommodation] : pupils were equal in size, round, reactive to light, with normal accommodation [Extraocular Movements] : extraocular movements were intact [No APD] : no afferent pupillary defect [No HELGA] : no internuclear ophthalmoplegia [Full Visual Field] : full visual field [FreeTextEntry1] : bilat cataracts [Outer Ear] : the ears and nose were normal in appearance [Neck Appearance] : the appearance of the neck was normal [Auscultation Breath Sounds / Voice Sounds] : lungs were clear to auscultation bilaterally [Edema] : there was no peripheral edema [Abdomen Mass (___ Cm)] : no abdominal mass palpated [No CVA Tenderness] : no ~M costovertebral angle tenderness [Involuntary Movements] : no involuntary movements were seen [Skin Color & Pigmentation] : normal skin color and pigmentation [] : no rash

## 2024-02-05 NOTE — REASON FOR VISIT
[Follow-Up: _____] : a [unfilled] follow-up visit [Spouse] : spouse [Family Member] : family member [FreeTextEntry1] : [facility]

## 2024-02-05 NOTE — ASSESSMENT
[FreeTextEntry1] : Assessment: 82yo RH WM, with vascular risk factors, PMH of laminectomy and residual foot drops, ataxic gait (pre-DM), here with concerns of memory problems. STM issues, mild. 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 Gait is impaired by radiculopathy, foot drop, gout and loss of vib sensation in UE and LE, possible initial sensory neuropathy.  Overall, stable. HR has been ok, higher.  BP stable after DC a few meds.   In all, no major progression, but I can feel a bit less engagement. Deconditioned and lost weight. Needs more PT and nutrition.  Impression: -probable LOAD -ataxic gait/sensory neuropathy/radiculopathy  Plan: -Aricept 10mg-continue -encourage activity and socialization -nutritionist and PT.

## 2024-02-05 NOTE — HISTORY OF PRESENT ILLNESS
[FreeTextEntry1] : COVID VACCINATION FULL. NO COVID.  HPI-Interval Hx [id number]: since last seen: -did not tolerate higher Aricept due to low HR -on Rexulti 0.5mg every other day, due to sedation, less agitation -appetite ok, but losing weight, now in 120s -sleeping a bit more, naps, ok at night -motor stable, no falls.      HPI-Interval Hx [id number]: Pt here with wife and daughter. Reduced some HTN meds, BP was low, now stable. Appetite still ok. Sleep ok, naps in the pm. Motor is stable, has started to use walker more for safety. Needs more reminders for some ADL, but overall no major issues.  HPI-Interval Hx [id number]: Pt at home with family.  Recent AngioRx Heart and stress test. Come changes in meds, DC lasix and ASA81.  Pt feels fine.  Per family, he needs more reminders, for ADL and IADL. CDR now 1.0.  Sleep and appetite are stable. His weight is 140lb at home now, which is lower than what measured in the office.  Motor: per family he is stable, but not very active. One fall from turning too fast once after closing a closet.   HPI-Interval Hx [id number]: Per pt and family her has been stable, eats and sleeps well. Tries to keep busy at home, but very limited. Somehow does not want to go out too much, due to COVID.     HPI-Interval Hx [id number]: pt here with wife.  Per pt he feels well, things are stable.  Per wife, he has been stable. Weight and appetite ok. Sleep ok. Motor ok, though one recent night he tripped on something on the floor, mild fall, no consequences. Has started Allopurinol for Gout.    HPI-Interval Hx [id number]: Taking Aricept at night, doing ok. Sleep ok. Appetite: ok. Motor: stable, limited by COVID, but no major changes.  He has had a few episodes where he has thought something was taken from him, and in a few occasions he has been speaking about the past as if he was there, unclear if there were VH or AH. He denies. He has been speaking Romanian with his brothers and cousins, from time to time.  ADL: full, needs some reminders IADL: no driving anymore, since NPT (?card sorting?); wife has always done finances and bills and taxes; never good at PC; ok with TV and phone CDR: 0.5.   HPI-interval hx [id number] RPA: AWT used. Has tried to change Aricept to the am, but got diarrhea, so taking it at night.  Sleeps well. Appetite intact, no issues. Gait ok.   HPI-interval hx [id number]: pt here with wife and daughter. He has been stable. NPT reviewed with Dr. Ewing, aMCI seems the case. He has been stable, maybe better, more attentive, makes extra efforts.  More attentive to his hydration as well. ADL and IADL stable. Driving ok. Got CT heart and CoronaryAngioRx, so far ok. Still with some tachycardia.   HPI: 77yo RH WM, with HTN, HLD, diet controlled DM II, ? CAD, here for report of forgetfulness. Radiculopathy in LE, with drop foot bilat, due to L4-L5 herniation (laminectomy in 2012).  PMH: Pt seen by Dr. Levine recently. Per wife, he has shown, over the last 6-12 months, STM issues, forgetting recent events, upcoming appointments. No problems with names, locations, faces. No changes in behavior. Imaging and NPT done recently. 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  Sleep: intact, no issues.  Appetite: intact, tried to lose some weight, went on diet, now stable.   Motor symptoms: limited by LE radiculopathy, uses braces  B/B: no issues, some polyuria.   Psychiatric symptoms: no issues.   ADL: intact IADL: full, drives, no issues CDR: 0.5.  Professional status: retired, former .  PCP and other physicians: -PCP: Carlito -Neuro: Abner.
Pain improved. Able to ambulate. To be discharged.

## 2024-02-15 DIAGNOSIS — E63.9 NUTRITIONAL DEFICIENCY, UNSPECIFIED: ICD-10-CM

## 2024-02-16 ENCOUNTER — RX RENEWAL (OUTPATIENT)
Age: 84
End: 2024-02-16

## 2024-03-19 ENCOUNTER — APPOINTMENT (OUTPATIENT)
Dept: CARDIOLOGY | Facility: CLINIC | Age: 84
End: 2024-03-19
Payer: MEDICARE

## 2024-03-19 VITALS
WEIGHT: 137 LBS | DIASTOLIC BLOOD PRESSURE: 68 MMHG | HEART RATE: 65 BPM | BODY MASS INDEX: 22.02 KG/M2 | HEIGHT: 66 IN | OXYGEN SATURATION: 100 % | SYSTOLIC BLOOD PRESSURE: 135 MMHG

## 2024-03-19 PROCEDURE — 93000 ELECTROCARDIOGRAM COMPLETE: CPT

## 2024-03-19 PROCEDURE — 99212 OFFICE O/P EST SF 10 MIN: CPT | Mod: 25

## 2024-03-20 ENCOUNTER — RX CHANGE (OUTPATIENT)
Age: 84
End: 2024-03-20

## 2024-03-20 RX ORDER — ASPIRIN 81 MG/1
81 TABLET, CHEWABLE ORAL
Refills: 0 | Status: ACTIVE | COMMUNITY
Start: 2024-03-20

## 2024-03-20 RX ORDER — ATORVASTATIN CALCIUM 10 MG/1
10 TABLET, FILM COATED ORAL
Qty: 90 | Refills: 1 | Status: ACTIVE | COMMUNITY
Start: 2022-11-16

## 2024-03-20 RX ORDER — ALLOPURINOL 100 MG/1
100 TABLET ORAL
Qty: 90 | Refills: 2 | Status: ACTIVE | COMMUNITY
Start: 1900-01-01 | End: 1900-01-01

## 2024-03-20 RX ORDER — ALLOPURINOL 100 MG/1
100 TABLET ORAL
Qty: 30 | Refills: 3 | Status: DISCONTINUED | COMMUNITY
Start: 2021-02-04 | End: 2024-03-20

## 2024-03-30 NOTE — REASON FOR VISIT
[FreeTextEntry1] : Mati was seen by dermatology group Dr. Celaya a cardiac standpoint is stable and without incarnate complaints  update 9/9/22 Mati notes some dyspnea on exertion he broke out in a sweat while trying to help out in the garage he has exercise-induced symptoms which may represent angina  Update 11/16/2022 Mati is doing well on this current regimen however we note a slow but steady rise in creatinine the potassium is also at the upper limits of normal .a repeat renal is requested in 1 week time and furosemide is discontinued given euvolemic status and rising creatinine  Update 1/18/2023 Michael is doing well we have tapered his formulary discussed with Love encouraging fluid resuscitation creatinine slowly improving we will recheck renal indices.  Avoid ACE inhibitor with symptoms and renal failure.  Currently on carvedilol and Farxiga with good response  4/12/23 update  michael seems to be responding to the current regimen. we will continue the current course.   7/18/23 now on rexulti without ill effect  8/16/23 Mati complains of a cough and a chest x-ray is requested result has returned NO OBVIOUS CAUSE FOR THE COFFEE HAVE A CONCERN WITH RATED regarding a possible aortic aneurysm and a CAT scan was suggested by [facility]  11/21/23 comfortable no cp or sob    now on rexulti without apparent ill effects on titrated increased dosing.   7/18/23 tolerating current medical regimen without effects denies chest pains or shortness of breath  3/19/24 mostly memory issues. comes today with love who is following carefully

## 2024-03-30 NOTE — CARDIOLOGY SUMMARY
[de-identified] : 3/11/22 normal/rhythm anterior vesicular block [de-identified] : 5/7/19 calcium score 2526 which is severely elevated [___] : [unfilled] [LVEF ___%] : LVEF [unfilled]%

## 2024-03-30 NOTE — PHYSICAL EXAM
[Well Nourished] : well nourished [Well Developed] : well developed [No Acute Distress] : no acute distress [Normal Venous Pressure] : normal venous pressure [No Carotid Bruit] : no carotid bruit [Normal S1, S2] : normal S1, S2 [No Rub] : no rub [No Gallop] : no gallop [No Murmur] : no murmur [Clear Lung Fields] : clear lung fields [Good Air Entry] : good air entry [No Respiratory Distress] : no respiratory distress  [Soft] : abdomen soft [Non Tender] : non-tender [Normal Bowel Sounds] : normal bowel sounds [No Masses/organomegaly] : no masses/organomegaly [Normal Gait] : normal gait [No Edema] : no edema [No Cyanosis] : no cyanosis [No Clubbing] : no clubbing [No Varicosities] : no varicosities [No Rash] : no rash [No Skin Lesions] : no skin lesions [Moves all extremities] : moves all extremities [No Focal Deficits] : no focal deficits [Normal Speech] : normal speech [Alert and Oriented] : alert and oriented [Normal memory] : normal memory [General Appearance - Well Developed] : well developed [Normal Appearance] : normal appearance [Well Groomed] : well groomed [No Deformities] : no deformities [General Appearance - Well Nourished] : well nourished [General Appearance - In No Acute Distress] : no acute distress [Normal Conjunctiva] : the conjunctiva exhibited no abnormalities [Eyelids - No Xanthelasma] : the eyelids demonstrated no xanthelasmas [Normal Oral Mucosa] : normal oral mucosa [No Oral Pallor] : no oral pallor [Normal Jugular Venous A Waves Present] : normal jugular venous A waves present [No Oral Cyanosis] : no oral cyanosis [No Jugular Venous Telles A Waves] : no jugular venous telles A waves [Normal Jugular Venous V Waves Present] : normal jugular venous V waves present [Exaggerated Use Of Accessory Muscles For Inspiration] : no accessory muscle use [Respiration, Rhythm And Depth] : normal respiratory rhythm and effort [Auscultation Breath Sounds / Voice Sounds] : lungs were clear to auscultation bilaterally [Heart Rate And Rhythm] : heart rate and rhythm were normal [Heart Sounds] : normal S1 and S2 [Murmurs] : no murmurs present [Abdomen Tenderness] : non-tender [Abdomen Soft] : soft [Abdomen Mass (___ Cm)] : no abdominal mass palpated [Abnormal Walk] : normal gait [FreeTextEntry1] : uses  a cane [Nail Clubbing] : no clubbing of the fingernails [Cyanosis, Localized] : no localized cyanosis [Petechial Hemorrhages (___cm)] : no petechial hemorrhages [Skin Color & Pigmentation] : normal skin color and pigmentation [] : no rash [No Venous Stasis] : no venous stasis [Skin Lesions] : no skin lesions [No Skin Ulcers] : no skin ulcer [No Xanthoma] : no  xanthoma was observed [Oriented To Time, Place, And Person] : oriented to person, place, and time [Affect] : the affect was normal [No Anxiety] : not feeling anxious [Mood] : the mood was normal

## 2024-03-31 ENCOUNTER — NON-APPOINTMENT (OUTPATIENT)
Age: 84
End: 2024-03-31

## 2024-04-25 ENCOUNTER — RX RENEWAL (OUTPATIENT)
Age: 84
End: 2024-04-25

## 2024-04-25 RX ORDER — BREXPIPRAZOLE 0.5 MG/1
0.5 TABLET ORAL
Qty: 30 | Refills: 3 | Status: ACTIVE | COMMUNITY
Start: 2023-07-12 | End: 1900-01-01

## 2024-06-11 PROBLEM — G30.1 LATE ONSET ALZHEIMER DISEASE: Status: ACTIVE | Noted: 2023-01-09

## 2024-06-11 PROBLEM — I71.21 ANEURYSM OF AORTIC ROOT: Status: ACTIVE | Noted: 2023-08-19

## 2024-06-11 PROBLEM — I50.9 CHF (CONGESTIVE HEART FAILURE): Status: ACTIVE | Noted: 2019-09-15

## 2024-06-12 ENCOUNTER — APPOINTMENT (OUTPATIENT)
Dept: FAMILY MEDICINE | Facility: CLINIC | Age: 84
End: 2024-06-12
Payer: MEDICARE

## 2024-06-12 VITALS
WEIGHT: 142 LBS | RESPIRATION RATE: 18 BRPM | OXYGEN SATURATION: 97 % | BODY MASS INDEX: 22.82 KG/M2 | TEMPERATURE: 97.2 F | SYSTOLIC BLOOD PRESSURE: 141 MMHG | HEART RATE: 78 BPM | DIASTOLIC BLOOD PRESSURE: 70 MMHG | HEIGHT: 66 IN

## 2024-06-12 DIAGNOSIS — F02.80 ALZHEIMER'S DISEASE WITH LATE ONSET: ICD-10-CM

## 2024-06-12 DIAGNOSIS — N18.9 CHRONIC KIDNEY DISEASE, UNSPECIFIED: ICD-10-CM

## 2024-06-12 DIAGNOSIS — G30.1 ALZHEIMER'S DISEASE WITH LATE ONSET: ICD-10-CM

## 2024-06-12 DIAGNOSIS — E11.9 TYPE 2 DIABETES MELLITUS W/OUT COMPLICATIONS: ICD-10-CM

## 2024-06-12 DIAGNOSIS — I73.9 PERIPHERAL VASCULAR DISEASE, UNSPECIFIED: ICD-10-CM

## 2024-06-12 DIAGNOSIS — I50.9 HEART FAILURE, UNSPECIFIED: ICD-10-CM

## 2024-06-12 DIAGNOSIS — I71.21 ANEURYSM OF THE ASCENDING AORTA, WITHOUT RUPTURE: ICD-10-CM

## 2024-06-12 PROCEDURE — 99214 OFFICE O/P EST MOD 30 MIN: CPT

## 2024-06-12 PROCEDURE — G0009: CPT

## 2024-06-12 PROCEDURE — 90677 PCV20 VACCINE IM: CPT

## 2024-06-12 PROCEDURE — G2211 COMPLEX E/M VISIT ADD ON: CPT

## 2024-06-12 NOTE — PHYSICAL EXAM
[No Acute Distress] : no acute distress [Well-Appearing] : well-appearing [No JVD] : no jugular venous distention [Regular Rhythm] : with a regular rhythm [Normal S1, S2] : normal S1 and S2 [Normal] : soft, non-tender, non-distended, no masses palpated, no HSM and normal bowel sounds [No CVA Tenderness] : no CVA  tenderness [No Joint Swelling] : no joint swelling [No Spinal Tenderness] : no spinal tenderness [Normal Affect] : the affect was normal [Normal Insight/Judgement] : insight and judgment were intact [de-identified] : AA O x 2 person and place [de-identified] : braces on legs  difficulty walking uses roller walker [de-identified] : gait  instability [de-identified] : forgetful

## 2024-06-12 NOTE — HISTORY OF PRESENT ILLNESS
[Congestive Heart Failure] : Congestive Heart Failure [Coronary Artery Disease] : Coronary Artery Disease [Diabetes Mellitus] : Diabetes Mellitus [Hyperlipidemia] : Hyperlipidemia [Hypertension] : Hypertension [Other: ___] : [unfilled]: [No episodes] : No hypoglycemic episodes since the last visit. [Understanding of foot care] : Patient expressed understanding of foot care [Does not check BP] : The patient is not checking blood pressure [Spouse] : spouse [Family Member] : family member [Other: _____] : [unfilled] [FreeTextEntry6] : 84 y/o here ith wife and daughter for f/u medical conditons Most recent visit Dr. Woods for [facility] Pt here notes feels fine no issues  Pt's daughter  love states he forgets and at times does not void complete and drips on himself. pt stands and at times takes off the depend and wets self. pt  having issues with ADLs IADL s complete but daughter and wife that assist patient. Daughter Love notes needs assistance and ? about HHA services to help during the day for his ADLs

## 2024-06-12 NOTE — DATA REVIEWED
[FreeTextEntry1] : on ROS pt report all normal but family worried about declined in mental status and not able to do ADLs has issues going to BR and cleaning self well also some urinary incomitance does not realize at times either.  Daughter worried and notes needs to get HHA services

## 2024-06-15 ENCOUNTER — RX RENEWAL (OUTPATIENT)
Age: 84
End: 2024-06-15

## 2024-06-15 RX ORDER — DAPAGLIFLOZIN 10 MG/1
10 TABLET, FILM COATED ORAL
Qty: 90 | Refills: 1 | Status: ACTIVE | COMMUNITY
Start: 2022-09-26 | End: 1900-01-01

## 2024-06-15 RX ORDER — CARVEDILOL 3.12 MG/1
3.12 TABLET, FILM COATED ORAL
Qty: 180 | Refills: 1 | Status: ACTIVE | COMMUNITY
Start: 2022-09-26 | End: 1900-01-01

## 2024-06-27 ENCOUNTER — EMERGENCY (EMERGENCY)
Facility: HOSPITAL | Age: 84
LOS: 1 days | Discharge: HOME | End: 2024-06-27
Attending: EMERGENCY MEDICINE | Admitting: EMERGENCY MEDICINE
Payer: MEDICARE

## 2024-06-27 VITALS
SYSTOLIC BLOOD PRESSURE: 142 MMHG | RESPIRATION RATE: 16 BRPM | HEART RATE: 78 BPM | OXYGEN SATURATION: 99 % | DIASTOLIC BLOOD PRESSURE: 72 MMHG

## 2024-06-27 VITALS
RESPIRATION RATE: 18 BRPM | DIASTOLIC BLOOD PRESSURE: 86 MMHG | SYSTOLIC BLOOD PRESSURE: 141 MMHG | WEIGHT: 160.06 LBS | OXYGEN SATURATION: 95 % | HEIGHT: 70 IN | HEART RATE: 80 BPM | TEMPERATURE: 97 F

## 2024-06-27 DIAGNOSIS — Z98.89 OTHER SPECIFIED POSTPROCEDURAL STATES: Chronic | ICD-10-CM

## 2024-06-27 LAB
ALBUMIN SERPL ELPH-MCNC: 3.2 G/DL — LOW (ref 3.3–5)
ALP SERPL-CCNC: 116 U/L — SIGNIFICANT CHANGE UP (ref 40–120)
ALT FLD-CCNC: 24 U/L — SIGNIFICANT CHANGE UP (ref 10–45)
ANION GAP SERPL CALC-SCNC: 10 MMOL/L — SIGNIFICANT CHANGE UP (ref 5–17)
AST SERPL-CCNC: 25 U/L — SIGNIFICANT CHANGE UP (ref 10–40)
BASOPHILS # BLD AUTO: 0.03 K/UL — SIGNIFICANT CHANGE UP (ref 0–0.2)
BASOPHILS NFR BLD AUTO: 0.6 % — SIGNIFICANT CHANGE UP (ref 0–2)
BILIRUB SERPL-MCNC: 0.5 MG/DL — SIGNIFICANT CHANGE UP (ref 0.2–1.2)
BUN SERPL-MCNC: 35 MG/DL — HIGH (ref 7–23)
CALCIUM SERPL-MCNC: 9 MG/DL — SIGNIFICANT CHANGE UP (ref 8.4–10.5)
CHLORIDE SERPL-SCNC: 106 MMOL/L — SIGNIFICANT CHANGE UP (ref 96–108)
CO2 SERPL-SCNC: 24 MMOL/L — SIGNIFICANT CHANGE UP (ref 22–31)
CREAT SERPL-MCNC: 1.33 MG/DL — HIGH (ref 0.5–1.3)
EGFR: 53 ML/MIN/1.73M2 — LOW
EOSINOPHIL # BLD AUTO: 0.4 K/UL — SIGNIFICANT CHANGE UP (ref 0–0.5)
EOSINOPHIL NFR BLD AUTO: 7.3 % — HIGH (ref 0–6)
GLUCOSE SERPL-MCNC: 136 MG/DL — HIGH (ref 70–99)
HCT VFR BLD CALC: 40.9 % — SIGNIFICANT CHANGE UP (ref 39–50)
HGB BLD-MCNC: 14.1 G/DL — SIGNIFICANT CHANGE UP (ref 13–17)
IMM GRANULOCYTES NFR BLD AUTO: 0.4 % — SIGNIFICANT CHANGE UP (ref 0–0.9)
LYMPHOCYTES # BLD AUTO: 1.44 K/UL — SIGNIFICANT CHANGE UP (ref 1–3.3)
LYMPHOCYTES # BLD AUTO: 26.4 % — SIGNIFICANT CHANGE UP (ref 13–44)
MCHC RBC-ENTMCNC: 34.5 GM/DL — SIGNIFICANT CHANGE UP (ref 32–36)
MCHC RBC-ENTMCNC: 34.8 PG — HIGH (ref 27–34)
MCV RBC AUTO: 101 FL — HIGH (ref 80–100)
MONOCYTES # BLD AUTO: 0.52 K/UL — SIGNIFICANT CHANGE UP (ref 0–0.9)
MONOCYTES NFR BLD AUTO: 9.5 % — SIGNIFICANT CHANGE UP (ref 2–14)
NEUTROPHILS # BLD AUTO: 3.04 K/UL — SIGNIFICANT CHANGE UP (ref 1.8–7.4)
NEUTROPHILS NFR BLD AUTO: 55.8 % — SIGNIFICANT CHANGE UP (ref 43–77)
NRBC # BLD: 0 /100 WBCS — SIGNIFICANT CHANGE UP (ref 0–0)
NT-PROBNP SERPL-SCNC: 383 PG/ML — HIGH (ref 0–300)
PLATELET # BLD AUTO: 176 K/UL — SIGNIFICANT CHANGE UP (ref 150–400)
POTASSIUM SERPL-MCNC: 5.1 MMOL/L — SIGNIFICANT CHANGE UP (ref 3.5–5.3)
POTASSIUM SERPL-SCNC: 5.1 MMOL/L — SIGNIFICANT CHANGE UP (ref 3.5–5.3)
PROT SERPL-MCNC: 6.4 G/DL — SIGNIFICANT CHANGE UP (ref 6–8.3)
RBC # BLD: 4.05 M/UL — LOW (ref 4.2–5.8)
RBC # FLD: 14 % — SIGNIFICANT CHANGE UP (ref 10.3–14.5)
SODIUM SERPL-SCNC: 140 MMOL/L — SIGNIFICANT CHANGE UP (ref 135–145)
TROPONIN I, HIGH SENSITIVITY RESULT: 7.7 NG/L — SIGNIFICANT CHANGE UP
WBC # BLD: 5.45 K/UL — SIGNIFICANT CHANGE UP (ref 3.8–10.5)
WBC # FLD AUTO: 5.45 K/UL — SIGNIFICANT CHANGE UP (ref 3.8–10.5)

## 2024-06-27 PROCEDURE — 85025 COMPLETE CBC W/AUTO DIFF WBC: CPT

## 2024-06-27 PROCEDURE — 71045 X-RAY EXAM CHEST 1 VIEW: CPT

## 2024-06-27 PROCEDURE — 93005 ELECTROCARDIOGRAM TRACING: CPT

## 2024-06-27 PROCEDURE — 84484 ASSAY OF TROPONIN QUANT: CPT

## 2024-06-27 PROCEDURE — 71045 X-RAY EXAM CHEST 1 VIEW: CPT | Mod: 26

## 2024-06-27 PROCEDURE — 99285 EMERGENCY DEPT VISIT HI MDM: CPT | Mod: 25

## 2024-06-27 PROCEDURE — 83880 ASSAY OF NATRIURETIC PEPTIDE: CPT

## 2024-06-27 PROCEDURE — 93010 ELECTROCARDIOGRAM REPORT: CPT

## 2024-06-27 PROCEDURE — 36415 COLL VENOUS BLD VENIPUNCTURE: CPT

## 2024-06-27 PROCEDURE — 99285 EMERGENCY DEPT VISIT HI MDM: CPT

## 2024-06-27 PROCEDURE — 80053 COMPREHEN METABOLIC PANEL: CPT

## 2024-06-27 RX ORDER — SODIUM CHLORIDE 0.9 % (FLUSH) 0.9 %
500 SYRINGE (ML) INJECTION ONCE
Refills: 0 | Status: COMPLETED | OUTPATIENT
Start: 2024-06-27 | End: 2024-06-27

## 2024-06-27 RX ADMIN — Medication 1000 MILLILITER(S): at 15:31

## 2024-06-29 ENCOUNTER — TRANSCRIPTION ENCOUNTER (OUTPATIENT)
Age: 84
End: 2024-06-29

## 2024-07-02 ENCOUNTER — NON-APPOINTMENT (OUTPATIENT)
Age: 84
End: 2024-07-02

## 2024-07-23 ENCOUNTER — APPOINTMENT (OUTPATIENT)
Dept: CARDIOLOGY | Facility: CLINIC | Age: 84
End: 2024-07-23
Payer: MEDICARE

## 2024-07-23 ENCOUNTER — NON-APPOINTMENT (OUTPATIENT)
Age: 84
End: 2024-07-23

## 2024-07-23 VITALS
WEIGHT: 148 LBS | OXYGEN SATURATION: 100 % | HEART RATE: 71 BPM | SYSTOLIC BLOOD PRESSURE: 151 MMHG | HEIGHT: 66 IN | BODY MASS INDEX: 23.78 KG/M2 | DIASTOLIC BLOOD PRESSURE: 80 MMHG

## 2024-07-23 PROCEDURE — 93000 ELECTROCARDIOGRAM COMPLETE: CPT

## 2024-07-23 PROCEDURE — 93306 TTE W/DOPPLER COMPLETE: CPT

## 2024-07-23 PROCEDURE — 99214 OFFICE O/P EST MOD 30 MIN: CPT | Mod: 25

## 2024-07-23 NOTE — REASON FOR VISIT
[FreeTextEntry1] : Mati was seen by dermatology group Dr. Celaya a cardiac standpoint is stable and without incarnate complaints  update 9/9/22 Mati notes some dyspnea on exertion he broke out in a sweat while trying to help out in the garage he has exercise-induced symptoms which may represent angina  Update 11/16/2022 Mati is doing well on this current regimen however we note a slow but steady rise in creatinine the potassium is also at the upper limits of normal .a repeat renal is requested in 1 week time and furosemide is discontinued given euvolemic status and rising creatinine  Update 1/18/2023 Michael is doing well we have tapered his formulary discussed with Love encouraging fluid resuscitation creatinine slowly improving we will recheck renal indices.  Avoid ACE inhibitor with symptoms and renal failure.  Currently on carvedilol and Farxiga with good response  4/12/23 update  michael seems to be responding to the current regimen. we will continue the current course.   7/18/23 now on rexulti without ill effect  8/16/23 Mati complains of a cough and a chest x-ray is requested result has returned NO OBVIOUS CAUSE FOR THE COFFEE HAVE A CONCERN WITH RATED regarding a possible aortic aneurysm and a CAT scan was suggested by [facility]  11/21/23 comfortable no cp or sob    now on rexulti without apparent ill effects on titrated increased dosing.   7/18/23 tolerating current medical regimen without effects denies chest pains or shortness of breath  3/19/24 mostly memory issues. comes today with Love who is following carefully  7/23/2024 7/1/2024 Michael Rodriguez was recently brought to the hospital 6/27/2024 with dehydration serum troponin was negative 7.7 A1c 6.2 blood sugar 131 he is now deconditioned and poor ambulation and prone to fall. brought by daughter Love 9/22 EF 39 nuclear technique 10/22 echo mild LV dysfunction

## 2024-07-23 NOTE — ASSESSMENT
[FreeTextEntry1] : new cardiac recommendations  7/23/24 tte today rule out a progressive cardiomyopathy.  Transthoracic echo today is satisfactory .most likely deconditioning I discussed with Love.  Candidate for physical therapy home care DC carvedilol lethargy DC Farxiga urinary symptoms.  Consider adjustment of CNS medications which might be causing lethargy  Hi risk encounter

## 2024-07-23 NOTE — PHYSICAL EXAM
[Well Developed] : well developed [Well Nourished] : well nourished [No Acute Distress] : no acute distress [Normal Venous Pressure] : normal venous pressure [No Carotid Bruit] : no carotid bruit [Normal S1, S2] : normal S1, S2 [No Murmur] : no murmur [No Rub] : no rub [No Gallop] : no gallop [Clear Lung Fields] : clear lung fields [Good Air Entry] : good air entry [No Respiratory Distress] : no respiratory distress  [Soft] : abdomen soft [Non Tender] : non-tender [No Masses/organomegaly] : no masses/organomegaly [Normal Bowel Sounds] : normal bowel sounds [Normal Gait] : normal gait [No Edema] : no edema [No Cyanosis] : no cyanosis [No Clubbing] : no clubbing [No Varicosities] : no varicosities [No Rash] : no rash [No Skin Lesions] : no skin lesions [Moves all extremities] : moves all extremities [No Focal Deficits] : no focal deficits [Normal Speech] : normal speech [Alert and Oriented] : alert and oriented [Normal memory] : normal memory [General Appearance - Well Developed] : well developed [Normal Appearance] : normal appearance [Well Groomed] : well groomed [General Appearance - Well Nourished] : well nourished [No Deformities] : no deformities [General Appearance - In No Acute Distress] : no acute distress [Normal Conjunctiva] : the conjunctiva exhibited no abnormalities [Eyelids - No Xanthelasma] : the eyelids demonstrated no xanthelasmas [Normal Oral Mucosa] : normal oral mucosa [No Oral Pallor] : no oral pallor [No Oral Cyanosis] : no oral cyanosis [Normal Jugular Venous A Waves Present] : normal jugular venous A waves present [Normal Jugular Venous V Waves Present] : normal jugular venous V waves present [No Jugular Venous Telles A Waves] : no jugular venous telles A waves [Respiration, Rhythm And Depth] : normal respiratory rhythm and effort [Exaggerated Use Of Accessory Muscles For Inspiration] : no accessory muscle use [Auscultation Breath Sounds / Voice Sounds] : lungs were clear to auscultation bilaterally [Heart Rate And Rhythm] : heart rate and rhythm were normal [Heart Sounds] : normal S1 and S2 [Murmurs] : no murmurs present [Abdomen Soft] : soft [Abdomen Tenderness] : non-tender [Abdomen Mass (___ Cm)] : no abdominal mass palpated [Abnormal Walk] : normal gait [Nail Clubbing] : no clubbing of the fingernails [Cyanosis, Localized] : no localized cyanosis [Petechial Hemorrhages (___cm)] : no petechial hemorrhages [Skin Color & Pigmentation] : normal skin color and pigmentation [] : no rash [No Venous Stasis] : no venous stasis [No Skin Ulcers] : no skin ulcer [Skin Lesions] : no skin lesions [No Xanthoma] : no  xanthoma was observed [Oriented To Time, Place, And Person] : oriented to person, place, and time [Affect] : the affect was normal [Mood] : the mood was normal [No Anxiety] : not feeling anxious [FreeTextEntry1] : uses  a cane

## 2024-07-23 NOTE — CARDIOLOGY SUMMARY
[___] : [unfilled] [LVEF ___%] : LVEF [unfilled]% [de-identified] : 3/11/22 normal/rhythm anterior vesicular block [de-identified] : 5/7/19 calcium score 2526 which is severely elevated

## 2024-07-25 ENCOUNTER — TRANSCRIPTION ENCOUNTER (OUTPATIENT)
Age: 84
End: 2024-07-25

## 2024-08-05 ENCOUNTER — TRANSCRIPTION ENCOUNTER (OUTPATIENT)
Age: 84
End: 2024-08-05

## 2024-08-06 ENCOUNTER — TRANSCRIPTION ENCOUNTER (OUTPATIENT)
Age: 84
End: 2024-08-06

## 2024-08-07 PROBLEM — R91.1 LUNG NODULE: Status: ACTIVE | Noted: 2024-08-07

## 2024-08-08 ENCOUNTER — APPOINTMENT (OUTPATIENT)
Dept: CT IMAGING | Facility: HOSPITAL | Age: 84
End: 2024-08-08

## 2024-08-08 ENCOUNTER — TRANSCRIPTION ENCOUNTER (OUTPATIENT)
Age: 84
End: 2024-08-08

## 2024-08-08 ENCOUNTER — OUTPATIENT (OUTPATIENT)
Dept: OUTPATIENT SERVICES | Facility: HOSPITAL | Age: 84
LOS: 1 days | End: 2024-08-08
Payer: MEDICARE

## 2024-08-08 DIAGNOSIS — Z98.89 OTHER SPECIFIED POSTPROCEDURAL STATES: Chronic | ICD-10-CM

## 2024-08-08 DIAGNOSIS — R91.1 SOLITARY PULMONARY NODULE: ICD-10-CM

## 2024-08-08 PROCEDURE — 71250 CT THORAX DX C-: CPT | Mod: 26

## 2024-08-08 PROCEDURE — 71250 CT THORAX DX C-: CPT

## 2024-08-12 ENCOUNTER — TRANSCRIPTION ENCOUNTER (OUTPATIENT)
Age: 84
End: 2024-08-12

## 2024-08-14 ENCOUNTER — TRANSCRIPTION ENCOUNTER (OUTPATIENT)
Age: 84
End: 2024-08-14

## 2024-08-15 ENCOUNTER — TRANSCRIPTION ENCOUNTER (OUTPATIENT)
Age: 84
End: 2024-08-15

## 2024-08-19 ENCOUNTER — APPOINTMENT (OUTPATIENT)
Dept: NEUROLOGY | Facility: CLINIC | Age: 84
End: 2024-08-19
Payer: MEDICARE

## 2024-08-19 ENCOUNTER — TRANSCRIPTION ENCOUNTER (OUTPATIENT)
Age: 84
End: 2024-08-19

## 2024-08-19 VITALS
WEIGHT: 148 LBS | SYSTOLIC BLOOD PRESSURE: 171 MMHG | HEART RATE: 92 BPM | HEIGHT: 66 IN | DIASTOLIC BLOOD PRESSURE: 72 MMHG | BODY MASS INDEX: 23.78 KG/M2

## 2024-08-19 DIAGNOSIS — R26.0 ATAXIC GAIT: ICD-10-CM

## 2024-08-19 DIAGNOSIS — R26.81 UNSTEADINESS ON FEET: ICD-10-CM

## 2024-08-19 DIAGNOSIS — R68.89 OTHER GENERAL SYMPTOMS AND SIGNS: ICD-10-CM

## 2024-08-19 DIAGNOSIS — F41.9 ANXIETY DISORDER, UNSPECIFIED: ICD-10-CM

## 2024-08-19 PROCEDURE — G2211 COMPLEX E/M VISIT ADD ON: CPT

## 2024-08-19 PROCEDURE — 99214 OFFICE O/P EST MOD 30 MIN: CPT

## 2024-08-19 NOTE — ASSESSMENT
[FreeTextEntry1] : Assessment: 85yo RH WM, with vascular risk factors, PMH of laminectomy and residual foot drops, ataxic gait (pre-DM), here with concerns of memory problems. STM issues, mild. 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 Gait is impaired by radiculopathy, foot drop, gout and loss of vib sensation in UE and LE, possible initial sensory neuropathy.  Overall, stable - aside from recent COVID. Possible contribution of hypoglycemia to his "panic attacks" - would recommend monitor of glucose. Rexulti 0.5mg every other day.  Impression: -probable LOAD -ataxic gait/sensory neuropathy/radiculopathy  Plan: -no change in Rx for now -PT - continues -agree with idea to get HHA -get glucose monitor to r/o hypoglycemia.

## 2024-08-19 NOTE — HISTORY OF PRESENT ILLNESS
[FreeTextEntry1] : COVID VACCINATION FULL. NO COVID.  HPI-Interval Hx [id number]: Pt here with daughter. -A few panic attacks in the last few weeks, went to ER and got COVID, now better - possible contribution of hypoglycemia - un clear -Appetite reduced, but no weight loss...family manages to feed him well -sleep a bit erratic, but overall managed well -rexulti low dose, due to sedation, doing ok, well controlled -some incontinence on and off -motor stable -did PT for a few weeks - will continue when possible.  HPI-Interval Hx [id number]: since last seen: -did not tolerate higher Aricept due to low HR -on Rexulti 0.5mg every other day, due to sedation, less agitation -appetite ok, but losing weight, now in 120s -sleeping a bit more, naps, ok at night -motor stable, no falls.  HPI-Interval Hx [id number]: Pt here with wife and daughter. Reduced some HTN meds, BP was low, now stable. Appetite still ok. Sleep ok, naps in the pm. Motor is stable, has started to use walker more for safety. Needs more reminders for some ADL, but overall no major issues.  HPI-Interval Hx [id number]: Pt at home with family.  Recent AngioRx Heart and stress test. Come changes in meds, DC lasix and ASA81.  Pt feels fine.  Per family, he needs more reminders, for ADL and IADL. CDR now 1.0.  Sleep and appetite are stable. His weight is 140lb at home now, which is lower than what measured in the office.  Motor: per family he is stable, but not very active. One fall from turning too fast once after closing a closet.   HPI-Interval Hx [id number]: Per pt and family her has been stable, eats and sleeps well. Tries to keep busy at home, but very limited. Somehow does not want to go out too much, due to COVID.     HPI-Interval Hx [id number]: pt here with wife.  Per pt he feels well, things are stable.  Per wife, he has been stable. Weight and appetite ok. Sleep ok. Motor ok, though one recent night he tripped on something on the floor, mild fall, no consequences. Has started Allopurinol for Gout.    HPI-Interval Hx [id number]: Taking Aricept at night, doing ok. Sleep ok. Appetite: ok. Motor: stable, limited by COVID, but no major changes.  He has had a few episodes where he has thought something was taken from him, and in a few occasions he has been speaking about the past as if he was there, unclear if there were VH or AH. He denies. He has been speaking [place] with his brothers and cousins, from time to time.  ADL: full, needs some reminders IADL: no driving anymore, since NPT (?card sorting?); wife has always done finances and bills and taxes; never good at PC; ok with TV and phone CDR: 0.5.   HPI-interval hx [id number] RPA: AWT used. Has tried to change Aricept to the am, but got diarrhea, so taking it at night.  Sleeps well. Appetite intact, no issues. Gait ok.   HPI-interval hx [id number]: pt here with wife and daughter. He has been stable. NPT reviewed with Dr. Ewing, aMCI seems the case. He has been stable, maybe better, more attentive, makes extra efforts.  More attentive to his hydration as well. ADL and IADL stable. Driving ok. Got CT heart and CoronaryAngioRx, so far ok. Still with some tachycardia.   HPI: 79yo RH WM, with HTN, HLD, diet controlled DM II, ? CAD, here for report of forgetfulness. Radiculopathy in LE, with drop foot bilat, due to L4-L5 herniation (laminectomy in 2012).  PMH: Pt seen by Dr. Levine recently. Per wife, he has shown, over the last 6-12 months, STM issues, forgetting recent events, upcoming appointments. No problems with names, locations, faces. No changes in behavior. Imaging and NPT done recently. 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  Sleep: intact, no issues.  Appetite: intact, tried to lose some weight, went on diet, now stable.   Motor symptoms: limited by LE radiculopathy, uses braces  B/B: no issues, some polyuria.   Psychiatric symptoms: no issues.   ADL: intact IADL: full, drives, no issues CDR: 0.5.  Professional status: retired, former .  PCP and other physicians: -PCP: Carlito -Neuro: Abner.

## 2024-08-19 NOTE — DATA REVIEWED
[de-identified] : EXAM: MR BRAIN \par  \par  \par  PROCEDURE DATE: 12/05/2018 \par  \par  \par  \par  INTERPRETATION: CLINICAL STATEMENT: Increased memory loss \par  \par  TECHNIQUE: MRI of the brain was performed without gadolinium. \par  \par  COMPARISON: None. \par  \par  FINDINGS: \par  There is moderate diffuse parenchymal volume loss. There are T2 prolongation \par  signal abnormalities in the periventricular subcortical white matter likely \par  related to mild chronic microvascular ischemic changes. \par  \par  There is no acute parenchymal hemorrhage, parenchymal mass, mass effect or \par  midline shift. There is no extra-axial fluid collection. There is no \par  hydrocephalus. There is no acute infarct. Partial empty sella \par  \par  Loss of normal flow void in the intradural segment of distal right vertebral \par  artery noted. \par  \par  Mucosal thickening paranasal sinuses \par  \par  IMPRESSION: \par  No acute intracranial hemorrhage or acute infarct. \par  \par  Loss of normal flow void in the intradural segment of distal right vertebral \par  artery which may be related to occlusion/high-grade stenosis. \par  \par  \par  \par  \par  \par  \par  \par  \par  \par  ERYN GUPTA M.D., ATTENDING RADIOLOGIST \par  This document has been electronically signed. Dec 5 2018 2:33PM \par   [de-identified] : See chart. [de-identified] : Labs done, all wnl.

## 2024-08-20 ENCOUNTER — APPOINTMENT (OUTPATIENT)
Dept: CARDIOLOGY | Facility: CLINIC | Age: 84
End: 2024-08-20
Payer: MEDICARE

## 2024-08-20 VITALS
BODY MASS INDEX: 22.5 KG/M2 | WEIGHT: 140 LBS | HEART RATE: 90 BPM | DIASTOLIC BLOOD PRESSURE: 80 MMHG | SYSTOLIC BLOOD PRESSURE: 141 MMHG | HEIGHT: 66 IN | OXYGEN SATURATION: 99 %

## 2024-08-20 DIAGNOSIS — I50.9 HEART FAILURE, UNSPECIFIED: ICD-10-CM

## 2024-08-20 DIAGNOSIS — I25.10 ATHEROSCLEROTIC HEART DISEASE OF NATIVE CORONARY ARTERY W/OUT ANGINA PECTORIS: ICD-10-CM

## 2024-08-20 PROCEDURE — 99214 OFFICE O/P EST MOD 30 MIN: CPT | Mod: 25

## 2024-08-20 PROCEDURE — 93000 ELECTROCARDIOGRAM COMPLETE: CPT

## 2024-08-20 NOTE — HISTORY OF PRESENT ILLNESS
[FreeTextEntry1] : X-ray question of a 2.3 cm medial right upper lobe nodule opacity the remainder lungs were clear 8/5/2024

## 2024-08-21 NOTE — CARDIOLOGY SUMMARY
[___] : [unfilled] [LVEF ___%] : LVEF [unfilled]% [de-identified] : 3/11/22 normal/rhythm anterior vesicular block [de-identified] : 5/7/19 calcium score 2526 which is severely elevated [de-identified] : X-ray question of a 2.3 cm medial right upper lobe nodule opacity the remainder lungs were clear 8/5/2024

## 2024-08-21 NOTE — ASSESSMENT
[FreeTextEntry1] : new cardiac recommendations  7/23/24 tte today rule out a progressive cardiomyopathy.  Transthoracic echo today is satisfactory .most likely deconditioning I discussed with Love.  Candidate for physical therapy home care DC carvedilol lethargy DC Farxiga urinary symptoms.  Consider adjustment of CNS medications which might be causing lethargy  8/20//24 weight in the 140-pound range had COVID with consider a mems device as an alternative close attention to weight with judicious use of furosemide target weight 140 we note an abnormal chest x-ray and this should probably repeated   Hi risk encounter

## 2024-08-21 NOTE — REASON FOR VISIT
[FreeTextEntry1] : Mati was seen by dermatology group Dr. Celaya a cardiac standpoint is stable and without incarnate complaints  update 9/9/22 Mati notes some dyspnea on exertion he broke out in a sweat while trying to help out in the garage he has exercise-induced symptoms which may represent angina  Update 11/16/2022 Mati is doing well on this current regimen however we note a slow but steady rise in creatinine the potassium is also at the upper limits of normal .a repeat renal is requested in 1 week time and furosemide is discontinued given euvolemic status and rising creatinine  Update 1/18/2023 Michael is doing well we have tapered his formulary discussed with Love encouraging fluid resuscitation creatinine slowly improving we will recheck renal indices.  Avoid ACE inhibitor with symptoms and renal failure.  Currently on carvedilol and Farxiga with good response  4/12/23 update  michael seems to be responding to the current regimen. we will continue the current course.   7/18/23 now on rexulti without ill effect  8/16/23 Mati complains of a cough and a chest x-ray is requested result has returned NO OBVIOUS CAUSE FOR THE COFFEE HAVE A CONCERN WITH RATED regarding a possible aortic aneurysm and a CAT scan was suggested by [facility]  11/21/23 comfortable no cp or sob    now on rexulti without apparent ill effects on titrated increased dosing.   7/18/23 tolerating current medical regimen without effects denies chest pains or shortness of breath  3/19/24 mostly memory issues. comes today with Love who is following carefully  7/23/2024 7/1/2024 Michael Rodriguez was recently brought to the hospital 6/27/2024 with dehydration serum troponin was negative 7.7 A1c 6.2 blood sugar 131 he is now deconditioned and poor ambulation and prone to fall. brought by daughter Love 9/22 EF 39 nuclear technique 10/22 echo mild LV dysfunction  8/20/24 Michael was recently seen in the emergency room for the abrupt onset of shortness of breath Love wonders whether maybe a panic component involved.

## 2024-08-21 NOTE — CARDIOLOGY SUMMARY
[___] : [unfilled] [LVEF ___%] : LVEF [unfilled]% [de-identified] : 3/11/22 normal/rhythm anterior vesicular block [de-identified] : 5/7/19 calcium score 2526 which is severely elevated [de-identified] : X-ray question of a 2.3 cm medial right upper lobe nodule opacity the remainder lungs were clear 8/5/2024

## 2024-08-23 ENCOUNTER — APPOINTMENT (OUTPATIENT)
Dept: FAMILY MEDICINE | Facility: CLINIC | Age: 84
End: 2024-08-23
Payer: MEDICARE

## 2024-08-23 VITALS
SYSTOLIC BLOOD PRESSURE: 128 MMHG | HEART RATE: 96 BPM | TEMPERATURE: 98.2 F | OXYGEN SATURATION: 96 % | RESPIRATION RATE: 18 BRPM | DIASTOLIC BLOOD PRESSURE: 77 MMHG

## 2024-08-23 DIAGNOSIS — F02.80 ALZHEIMER'S DISEASE WITH LATE ONSET: ICD-10-CM

## 2024-08-23 DIAGNOSIS — J06.9 ACUTE UPPER RESPIRATORY INFECTION, UNSPECIFIED: ICD-10-CM

## 2024-08-23 DIAGNOSIS — I10 ESSENTIAL (PRIMARY) HYPERTENSION: ICD-10-CM

## 2024-08-23 DIAGNOSIS — E78.5 HYPERLIPIDEMIA, UNSPECIFIED: ICD-10-CM

## 2024-08-23 DIAGNOSIS — R06.02 SHORTNESS OF BREATH: ICD-10-CM

## 2024-08-23 DIAGNOSIS — G30.1 ALZHEIMER'S DISEASE WITH LATE ONSET: ICD-10-CM

## 2024-08-23 DIAGNOSIS — Z86.16 PERSONAL HISTORY OF COVID-19: ICD-10-CM

## 2024-08-23 PROCEDURE — 99214 OFFICE O/P EST MOD 30 MIN: CPT

## 2024-08-23 NOTE — HISTORY OF PRESENT ILLNESS
[Family Member] : family member [Other: _____] : [unfilled] [FreeTextEntry8] : 85 y/o PMHX HTN HLD DM2 well controlled, CAD CHF, CAROL recent COVID infection and now worsening cough post-viral and notes he has never smoked. Pt's daughter provides HIP notes dad is getting worse mentation and jut went through COVID and still cough persistent and cannot sleep no fevers or chills notes at times dad gets anxious and c/o not able to breath and feeling as if he is dying as per daughter and then states he is fine, Daughter worried and states cardiology stopped BB and went to Dr Woods and nothing added worried might be having some hypoglycemia pt off all medications for DM2  When I ask pt notes is fine and never smoked in his life.

## 2024-08-23 NOTE — PHYSICAL EXAM
[No Acute Distress] : no acute distress [EOMI] : extraocular movements intact [Normal Outer Ear/Nose] : the outer ears and nose were normal in appearance [Normal Oropharynx] : the oropharynx was normal [Normal TMs] : both tympanic membranes were normal [No JVD] : no jugular venous distention [No Lymphadenopathy] : no lymphadenopathy [Supple] : supple [Normal] : no respiratory distress, lungs were clear to auscultation bilaterally and no accessory muscle use [Regular Rhythm] : with a regular rhythm [Normal S1, S2] : normal S1 and S2 [Soft] : abdomen soft [Non Tender] : non-tender

## 2024-08-23 NOTE — REVIEW OF SYSTEMS
[Nasal Discharge] : nasal discharge [Cough] : cough [Dyspnea on Exertion] : dyspnea on exertion [Unsteady Walk] : ataxia [Shortness Of Breath] : no shortness of breath [Wheezing] : no wheezing [FreeTextEntry4] : recent URI [FreeTextEntry6] : at times c/o not breathign right  [de-identified] : uses walker to ambulate

## 2024-08-23 NOTE — REVIEW OF SYSTEMS
[Nasal Discharge] : nasal discharge [Cough] : cough [Dyspnea on Exertion] : dyspnea on exertion [Unsteady Walk] : ataxia [Shortness Of Breath] : no shortness of breath [Wheezing] : no wheezing [FreeTextEntry4] : recent URI [FreeTextEntry6] : at times c/o not breathign right  [de-identified] : uses walker to ambulate

## 2024-08-26 ENCOUNTER — TRANSCRIPTION ENCOUNTER (OUTPATIENT)
Age: 84
End: 2024-08-26

## 2024-08-27 ENCOUNTER — NON-APPOINTMENT (OUTPATIENT)
Age: 84
End: 2024-08-27

## 2024-09-16 ENCOUNTER — RX RENEWAL (OUTPATIENT)
Age: 84
End: 2024-09-16

## 2024-11-07 ENCOUNTER — NON-APPOINTMENT (OUTPATIENT)
Age: 84
End: 2024-11-07

## 2024-11-13 ENCOUNTER — TRANSCRIPTION ENCOUNTER (OUTPATIENT)
Age: 84
End: 2024-11-13

## 2024-11-19 ENCOUNTER — NON-APPOINTMENT (OUTPATIENT)
Age: 84
End: 2024-11-19

## 2024-11-19 ENCOUNTER — APPOINTMENT (OUTPATIENT)
Dept: CARDIOLOGY | Facility: CLINIC | Age: 84
End: 2024-11-19
Payer: MEDICARE

## 2024-11-19 VITALS
SYSTOLIC BLOOD PRESSURE: 129 MMHG | BODY MASS INDEX: 24.11 KG/M2 | WEIGHT: 150 LBS | HEIGHT: 66 IN | OXYGEN SATURATION: 97 % | HEART RATE: 90 BPM | DIASTOLIC BLOOD PRESSURE: 76 MMHG

## 2024-11-19 DIAGNOSIS — I25.10 ATHEROSCLEROTIC HEART DISEASE OF NATIVE CORONARY ARTERY W/OUT ANGINA PECTORIS: ICD-10-CM

## 2024-11-19 DIAGNOSIS — I50.9 HEART FAILURE, UNSPECIFIED: ICD-10-CM

## 2024-11-19 PROCEDURE — 93000 ELECTROCARDIOGRAM COMPLETE: CPT

## 2024-11-19 PROCEDURE — 99214 OFFICE O/P EST MOD 30 MIN: CPT | Mod: 25

## 2024-12-20 DIAGNOSIS — R13.10 DYSPHAGIA, UNSPECIFIED: ICD-10-CM

## 2024-12-20 DIAGNOSIS — R15.9 FULL INCONTINENCE OF FECES: ICD-10-CM

## 2024-12-20 RX ORDER — LORATADINE 5 MG
17 TABLET,CHEWABLE ORAL TWICE DAILY
Qty: 1 | Refills: 1 | Status: ACTIVE | COMMUNITY
Start: 2024-12-20 | End: 1900-01-01

## 2024-12-30 ENCOUNTER — APPOINTMENT (OUTPATIENT)
Dept: NEUROLOGY | Facility: CLINIC | Age: 84
End: 2024-12-30

## 2025-01-14 ENCOUNTER — OUTPATIENT (OUTPATIENT)
Dept: OUTPATIENT SERVICES | Facility: HOSPITAL | Age: 85
LOS: 1 days | End: 2025-01-14
Payer: MEDICARE

## 2025-01-14 ENCOUNTER — NON-APPOINTMENT (OUTPATIENT)
Age: 85
End: 2025-01-14

## 2025-01-14 DIAGNOSIS — Z98.89 OTHER SPECIFIED POSTPROCEDURAL STATES: Chronic | ICD-10-CM

## 2025-01-14 DIAGNOSIS — R13.10 DYSPHAGIA, UNSPECIFIED: ICD-10-CM

## 2025-01-14 PROCEDURE — 92611 MOTION FLUOROSCOPY/SWALLOW: CPT

## 2025-01-14 PROCEDURE — 74230 X-RAY XM SWLNG FUNCJ C+: CPT

## 2025-01-14 PROCEDURE — 74230 X-RAY XM SWLNG FUNCJ C+: CPT | Mod: 26

## 2025-01-21 ENCOUNTER — APPOINTMENT (OUTPATIENT)
Dept: GASTROENTEROLOGY | Facility: CLINIC | Age: 85
End: 2025-01-21
Payer: MEDICARE

## 2025-01-21 VITALS
RESPIRATION RATE: 16 BRPM | HEART RATE: 76 BPM | OXYGEN SATURATION: 97 % | TEMPERATURE: 98 F | HEIGHT: 66 IN | SYSTOLIC BLOOD PRESSURE: 135 MMHG | DIASTOLIC BLOOD PRESSURE: 72 MMHG | WEIGHT: 150 LBS | BODY MASS INDEX: 24.11 KG/M2

## 2025-01-21 DIAGNOSIS — Z79.82 LONG TERM (CURRENT) USE OF ASPIRIN: ICD-10-CM

## 2025-01-21 DIAGNOSIS — R19.4 CHANGE IN BOWEL HABIT: ICD-10-CM

## 2025-01-21 PROCEDURE — 99204 OFFICE O/P NEW MOD 45 MIN: CPT

## 2025-01-21 RX ORDER — OMEPRAZOLE 20 MG/1
20 CAPSULE, DELAYED RELEASE ORAL DAILY
Qty: 90 | Refills: 3 | Status: ACTIVE | COMMUNITY
Start: 2025-01-21 | End: 2026-01-16

## 2025-01-23 ENCOUNTER — RX RENEWAL (OUTPATIENT)
Age: 85
End: 2025-01-23

## 2025-01-25 ENCOUNTER — RX RENEWAL (OUTPATIENT)
Age: 85
End: 2025-01-25

## 2025-01-27 ENCOUNTER — TRANSCRIPTION ENCOUNTER (OUTPATIENT)
Age: 85
End: 2025-01-27

## 2025-01-29 ENCOUNTER — APPOINTMENT (OUTPATIENT)
Dept: FAMILY MEDICINE | Facility: CLINIC | Age: 85
End: 2025-01-29
Payer: MEDICARE

## 2025-01-29 VITALS
BODY MASS INDEX: 23.78 KG/M2 | TEMPERATURE: 97.7 F | RESPIRATION RATE: 16 BRPM | DIASTOLIC BLOOD PRESSURE: 72 MMHG | HEIGHT: 66 IN | SYSTOLIC BLOOD PRESSURE: 153 MMHG | OXYGEN SATURATION: 99 % | WEIGHT: 148 LBS | HEART RATE: 80 BPM

## 2025-01-29 VITALS — DIASTOLIC BLOOD PRESSURE: 81 MMHG | SYSTOLIC BLOOD PRESSURE: 146 MMHG

## 2025-01-29 DIAGNOSIS — Z00.00 ENCOUNTER FOR GENERAL ADULT MEDICAL EXAMINATION W/OUT ABNORMAL FINDINGS: ICD-10-CM

## 2025-01-29 DIAGNOSIS — E11.9 TYPE 2 DIABETES MELLITUS W/OUT COMPLICATIONS: ICD-10-CM

## 2025-01-29 DIAGNOSIS — M1A.9XX0 CHRONIC GOUT, UNSPECIFIED, W/OUT TOPHUS (TOPHI): ICD-10-CM

## 2025-01-29 DIAGNOSIS — I10 ESSENTIAL (PRIMARY) HYPERTENSION: ICD-10-CM

## 2025-01-29 DIAGNOSIS — F02.80 ALZHEIMER'S DISEASE WITH LATE ONSET: ICD-10-CM

## 2025-01-29 DIAGNOSIS — G30.1 ALZHEIMER'S DISEASE WITH LATE ONSET: ICD-10-CM

## 2025-01-29 DIAGNOSIS — R13.10 DYSPHAGIA, UNSPECIFIED: ICD-10-CM

## 2025-01-29 DIAGNOSIS — R26.0 ATAXIC GAIT: ICD-10-CM

## 2025-01-29 DIAGNOSIS — I50.9 HEART FAILURE, UNSPECIFIED: ICD-10-CM

## 2025-01-29 PROCEDURE — G0008: CPT

## 2025-01-29 PROCEDURE — G0439: CPT

## 2025-01-29 PROCEDURE — 90662 IIV NO PRSV INCREASED AG IM: CPT

## 2025-02-13 ENCOUNTER — LABORATORY RESULT (OUTPATIENT)
Age: 85
End: 2025-02-13

## 2025-02-19 ENCOUNTER — APPOINTMENT (OUTPATIENT)
Dept: NEUROLOGY | Facility: CLINIC | Age: 85
End: 2025-02-19
Payer: MEDICARE

## 2025-02-19 VITALS
BODY MASS INDEX: 22.98 KG/M2 | HEART RATE: 89 BPM | WEIGHT: 143 LBS | HEIGHT: 66 IN | DIASTOLIC BLOOD PRESSURE: 73 MMHG | SYSTOLIC BLOOD PRESSURE: 153 MMHG

## 2025-02-19 DIAGNOSIS — R26.0 ATAXIC GAIT: ICD-10-CM

## 2025-02-19 DIAGNOSIS — F02.80 ALZHEIMER'S DISEASE WITH LATE ONSET: ICD-10-CM

## 2025-02-19 DIAGNOSIS — R13.10 DYSPHAGIA, UNSPECIFIED: ICD-10-CM

## 2025-02-19 DIAGNOSIS — R68.89 OTHER GENERAL SYMPTOMS AND SIGNS: ICD-10-CM

## 2025-02-19 DIAGNOSIS — G30.1 ALZHEIMER'S DISEASE WITH LATE ONSET: ICD-10-CM

## 2025-02-19 DIAGNOSIS — R26.81 UNSTEADINESS ON FEET: ICD-10-CM

## 2025-02-19 DIAGNOSIS — E63.9 NUTRITIONAL DEFICIENCY, UNSPECIFIED: ICD-10-CM

## 2025-02-19 PROCEDURE — G2211 COMPLEX E/M VISIT ADD ON: CPT

## 2025-02-19 PROCEDURE — 99214 OFFICE O/P EST MOD 30 MIN: CPT

## 2025-02-21 ENCOUNTER — APPOINTMENT (OUTPATIENT)
Dept: RADIOLOGY | Facility: HOSPITAL | Age: 85
End: 2025-02-21

## 2025-03-12 ENCOUNTER — NON-APPOINTMENT (OUTPATIENT)
Age: 85
End: 2025-03-12

## 2025-03-12 ENCOUNTER — APPOINTMENT (OUTPATIENT)
Dept: CARDIOLOGY | Facility: CLINIC | Age: 85
End: 2025-03-12
Payer: MEDICARE

## 2025-03-12 VITALS
OXYGEN SATURATION: 98 % | HEART RATE: 80 BPM | DIASTOLIC BLOOD PRESSURE: 80 MMHG | SYSTOLIC BLOOD PRESSURE: 140 MMHG | RESPIRATION RATE: 18 BRPM | BODY MASS INDEX: 22.98 KG/M2 | HEIGHT: 66 IN | WEIGHT: 143 LBS

## 2025-03-12 DIAGNOSIS — I50.9 HEART FAILURE, UNSPECIFIED: ICD-10-CM

## 2025-03-12 PROCEDURE — 93000 ELECTROCARDIOGRAM COMPLETE: CPT

## 2025-03-12 PROCEDURE — 99213 OFFICE O/P EST LOW 20 MIN: CPT | Mod: 25

## 2025-07-22 ENCOUNTER — APPOINTMENT (OUTPATIENT)
Dept: NEUROLOGY | Facility: CLINIC | Age: 85
End: 2025-07-22
Payer: MEDICARE

## 2025-07-22 VITALS
HEART RATE: 79 BPM | DIASTOLIC BLOOD PRESSURE: 78 MMHG | HEIGHT: 66 IN | BODY MASS INDEX: 22.5 KG/M2 | WEIGHT: 140 LBS | SYSTOLIC BLOOD PRESSURE: 150 MMHG

## 2025-07-22 DIAGNOSIS — R26.81 UNSTEADINESS ON FEET: ICD-10-CM

## 2025-07-22 DIAGNOSIS — R41.3 OTHER AMNESIA: ICD-10-CM

## 2025-07-22 DIAGNOSIS — F41.9 ANXIETY DISORDER, UNSPECIFIED: ICD-10-CM

## 2025-07-22 DIAGNOSIS — M21.371 FOOT DROP, RIGHT FOOT: ICD-10-CM

## 2025-07-22 DIAGNOSIS — R68.89 OTHER GENERAL SYMPTOMS AND SIGNS: ICD-10-CM

## 2025-07-22 DIAGNOSIS — F02.80 ALZHEIMER'S DISEASE WITH LATE ONSET: ICD-10-CM

## 2025-07-22 DIAGNOSIS — R26.0 ATAXIC GAIT: ICD-10-CM

## 2025-07-22 DIAGNOSIS — G30.1 ALZHEIMER'S DISEASE WITH LATE ONSET: ICD-10-CM

## 2025-07-22 PROCEDURE — 99214 OFFICE O/P EST MOD 30 MIN: CPT

## 2025-07-23 ENCOUNTER — APPOINTMENT (OUTPATIENT)
Dept: CARDIOLOGY | Facility: CLINIC | Age: 85
End: 2025-07-23
Payer: MEDICARE

## 2025-07-23 VITALS
DIASTOLIC BLOOD PRESSURE: 80 MMHG | SYSTOLIC BLOOD PRESSURE: 167 MMHG | WEIGHT: 140 LBS | BODY MASS INDEX: 22.5 KG/M2 | OXYGEN SATURATION: 98 % | RESPIRATION RATE: 17 BRPM | HEART RATE: 83 BPM | HEIGHT: 66 IN

## 2025-07-23 PROCEDURE — 93000 ELECTROCARDIOGRAM COMPLETE: CPT

## 2025-07-23 PROCEDURE — 99214 OFFICE O/P EST MOD 30 MIN: CPT | Mod: 25
